# Patient Record
Sex: FEMALE | Race: WHITE | NOT HISPANIC OR LATINO | Employment: OTHER | URBAN - METROPOLITAN AREA
[De-identification: names, ages, dates, MRNs, and addresses within clinical notes are randomized per-mention and may not be internally consistent; named-entity substitution may affect disease eponyms.]

---

## 2024-04-12 ENCOUNTER — OFFICE VISIT (OUTPATIENT)
Dept: ORTHOPEDIC SURGERY | Facility: HOSPITAL | Age: 69
End: 2024-04-12
Payer: MEDICARE

## 2024-04-12 ENCOUNTER — HOSPITAL ENCOUNTER (OUTPATIENT)
Dept: RADIOLOGY | Facility: HOSPITAL | Age: 69
Discharge: HOME | End: 2024-04-12
Payer: MEDICARE

## 2024-04-12 VITALS — HEIGHT: 61 IN | BODY MASS INDEX: 31.53 KG/M2 | WEIGHT: 167 LBS

## 2024-04-12 DIAGNOSIS — M25.521 BILATERAL ELBOW JOINT PAIN: ICD-10-CM

## 2024-04-12 DIAGNOSIS — M25.522 BILATERAL ELBOW JOINT PAIN: ICD-10-CM

## 2024-04-12 DIAGNOSIS — M19.022 ARTHRITIS OF BOTH ELBOWS: Primary | ICD-10-CM

## 2024-04-12 DIAGNOSIS — M19.021 ARTHRITIS OF BOTH ELBOWS: Primary | ICD-10-CM

## 2024-04-12 PROCEDURE — 73080 X-RAY EXAM OF ELBOW: CPT | Mod: 50

## 2024-04-12 PROCEDURE — 1159F MED LIST DOCD IN RCRD: CPT | Performed by: NURSE PRACTITIONER

## 2024-04-12 PROCEDURE — 1036F TOBACCO NON-USER: CPT | Performed by: NURSE PRACTITIONER

## 2024-04-12 PROCEDURE — 99203 OFFICE O/P NEW LOW 30 MIN: CPT | Performed by: NURSE PRACTITIONER

## 2024-04-12 PROCEDURE — 73080 X-RAY EXAM OF ELBOW: CPT | Mod: BILATERAL PROCEDURE | Performed by: RADIOLOGY

## 2024-04-12 PROCEDURE — 1160F RVW MEDS BY RX/DR IN RCRD: CPT | Performed by: NURSE PRACTITIONER

## 2024-04-12 PROCEDURE — 1125F AMNT PAIN NOTED PAIN PRSNT: CPT | Performed by: NURSE PRACTITIONER

## 2024-04-12 PROCEDURE — 99213 OFFICE O/P EST LOW 20 MIN: CPT | Performed by: NURSE PRACTITIONER

## 2024-04-12 RX ORDER — CALCIUM CARBONATE/VITAMIN D3 600MG-5MCG
2 TABLET ORAL DAILY
COMMUNITY

## 2024-04-12 RX ORDER — LEVOTHYROXINE SODIUM 112 UG/1
112 TABLET ORAL
COMMUNITY
Start: 2022-07-05

## 2024-04-12 RX ORDER — GUAIFENESIN 1200 MG
1000 TABLET, EXTENDED RELEASE 12 HR ORAL
COMMUNITY

## 2024-04-12 RX ORDER — CETIRIZINE HYDROCHLORIDE 10 MG/1
10 TABLET ORAL
COMMUNITY

## 2024-04-12 RX ORDER — ROPINIROLE 0.5 MG/1
0.5 TABLET, FILM COATED ORAL NIGHTLY
COMMUNITY

## 2024-04-12 RX ORDER — AMLODIPINE BESYLATE 5 MG/1
5 TABLET ORAL
COMMUNITY
Start: 2019-06-10

## 2024-04-12 RX ORDER — ASCORBIC ACID 500 MG
500 TABLET ORAL
COMMUNITY
Start: 2023-12-12

## 2024-04-12 ASSESSMENT — PAIN DESCRIPTION - DESCRIPTORS: DESCRIPTORS: ACHING

## 2024-04-12 ASSESSMENT — PAIN SCALES - GENERAL: PAINLEVEL_OUTOF10: 4

## 2024-04-12 ASSESSMENT — PAIN - FUNCTIONAL ASSESSMENT: PAIN_FUNCTIONAL_ASSESSMENT: 0-10

## 2024-04-12 NOTE — PROGRESS NOTES
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      Patient Discussion/Plan:  We had a discussion about the various options for the elbow arthritis. They can do nothing and continue with activity modifications. Consider cortisone injections into the joint. This would give pain relief that is temporary and would not stop the progression of arthritis. This injection can last not at all, for 2 weeks, 4 weeks, 3 months or longer. Each patient has a different outcome. The risk of injection is fairly minimal, but does included a very small chance of infection. Thirdly, they can consider a total elbow replacement. This will give the patient a more permanent solution to pain relief. This is an elective procedure and there is no rush.    The goal is to return to regular activities at 3-4 months, but can expect 6 months to a year for a full recovery. Limited to no lifting over 10 pounds for life after replacement.     Dionne would like to proceed with OT for her bilateral elbows at this time.  A therapy prescription was given to the patient today. She will plan to do this with her provider near her home, with whom she is working with for other body system.    All the patient's questions were answered today to their satisfaction and they are in agreement with the above plan.     Should you have any questions or concerns after your visit today, please do not hesitate to call the office at [phone]. We strive to give you high-quality, patient-centered, collaborative care and I am honored to be a part of your health care team.     -------------------------------------------------------------------------------------------------  CHIEF COMPLAINT:  NPV BILATERAL ELBOW PAIN    History of Present Illness:  Florina Doll is a pleasant 69 y.o. 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 She is accompanied by her therapist, Ze who has  referred her to our team. She is presenting to clinic with bilateral elbow pain, right  worse than left today. She has had elbow pain for years. Pain worsened after falling onto her left elbow when she slipped on ice 'years ago'. Denies recent trauma. Bilateral elbow XR today.       Florina has had this elbow pain for several years. They dislocated their left elbow in 1978 and had a stabilization surgery. Florina recently returned to the gym and attempted upper body strengthening but had to discontinue upper body lifting due to elbow pain. Their elbow pain wakes them up at night. Florina denies any paresthesia bilaterally. No recent falls reported. She has been worked up in the past for her elbows at [facility] and was told to 'live with it as she is too young for elbow replacement'.    She has not tried any therapy. She has had previous injections without much relief. The elbow has not really gotten any better. 50% of a normal elbow. Does not wake her from sleep, but both of her elbows are stiff and 'locked' upon waking. Does not feel unstable. No numbness or tingling. Taking over-the counter medications for pain relief. She uses Oil and tumeric to help with the painful symptoms. She has tried the Band-It brace without much improvement. She has tried ice, which also does not help. Denies fevers or chills. She is able to continue to play her musical instrument.     Smoking Status: Non-smoker  Diabetic: Denies  BMI: 32    Past medical history, surgical history, social history and family history were all reviewed and are per the patient's health history. Family history is not part of patient's issue today.      Allergies:  Allergies   Allergen Reactions    Clarithromycin Swelling    Sulfa (Sulfonamide Antibiotics) Itching and Rash    Topiramate Rash, Other and Swelling     Other reaction(s): Other: See Comments   Hands and feet go numb    Hands and feet go numb    Cefazolin Rash    Iodinated Contrast Media Itching and Rash      ITCHED ALL OVER    Morphine Hives, Itching and Rash    Sulfamethoxazole-Trimethoprim Rash       History reviewed. No pertinent past medical history.    History reviewed. No pertinent surgical history.    Social History     Socioeconomic History    Marital status:      Spouse name: Not on file    Number of children: Not on file    Years of education: Not on file    Highest education level: Not on file   Occupational History    Not on file   Tobacco Use    Smoking status: Never    Smokeless tobacco: Never   Vaping Use    Vaping status: Never Used   Substance and Sexual Activity    Alcohol use: Never    Drug use: Never    Sexual activity: Not on file   Other Topics Concern    Not on file   Social History Narrative    Not on file     Social Determinants of Health     Financial Resource Strain: Low Risk  (10/11/2022)    Received from [facility]    Overall Financial Resource Strain (CARDIA)     Difficulty of Paying Living Expenses: Not hard at all   Food Insecurity: No Food Insecurity (10/11/2022)    Received from [facility]    Hunger Vital Sign     Worried About Running Out of Food in the Last Year: Never true     Ran Out of Food in the Last Year: Never true   Transportation Needs: No Transportation Needs (3/19/2019)    Received from [facility]    PRAPARE - Transportation     Lack of Transportation (Medical): No     Lack of Transportation (Non-Medical): No   Physical Activity: Insufficiently Active (9/24/2019)    Received from [facility]    Exercise Vital Sign     Days of Exercise per Week: 2 days     Minutes of Exercise per Session: 30 min   Stress: Not on file   Social Connections: Not on file   Intimate Partner Violence: Not on file   Housing Stability: Not on file       Medications:    Current Outpatient Medications:     amLODIPine (Norvasc) 5 mg tablet, Take 1 tablet (5 mg) by mouth once daily., Disp: , Rfl:      ascorbic acid (Vitamin C) 500 mg tablet, Take 1 tablet (500 mg) by mouth once daily., Disp: , Rfl:     levothyroxine (Synthroid, Levoxyl) 112 mcg tablet, Take 1 tablet (112 mcg) by mouth., Disp: , Rfl:     acetaminophen (Tylenol) 325 mg capsule, Take 1,000 mg by mouth., Disp: , Rfl:     calcium carbonate-vitamin D3 600 mg-5 mcg (200 unit) tablet, Take 2 tablets by mouth once daily., Disp: , Rfl:     cetirizine (ZyrTEC) 10 mg tablet, Take 1 tablet (10 mg) by mouth once daily in the morning. Take before meals., Disp: , Rfl:     rOPINIRole (Requip) 0.5 mg tablet, Take 1 tablet (0.5 mg) by mouth once daily at bedtime., Disp: , Rfl:     Family History:  No family history on file.    Review of Systems:   Review of systems for all 14 systems is negative for complaint except for the above noted findings in the history of present illness.    Diagnoses/Problems:  BILATERAL ELBOW ARTHRITIS    Physical Examination:  Patient is a well-developed, well nourished female in no acute distress. Awake, alert and oriented x3, with appropriate mood. Breathes with normal chest rises. Eye exam reveals round pupils with clear sclera. Gait is steady.     Examination of bilateral elbows today reveals the skin to be intact, no evidence of ecchymosis, bruising, lesions. 5 out of 5 wrist flexion and extension. Stable to varus and valgus stress. Range of motion of the bilateral elbows revealed 20/ degree of extension and flexion. 85/85 degrees of supination and pronation. Active elbow flexion and extension. Active thumb extension. Sensation intact to light touch to median, ulnar, radial nerves. Patient had no tenderness to palpation at their medial or lateral epicondyle.    Results/Imaging:  Independent review of the imaging today of BILATERAL elbows shows no signs of any fracture or dislocation. Severe arthritis with loose bodies. L>R. I personally spent time viewing digital images of the radiology testing with the patient. I explained the  results to the patient, along with suggested explanation for follow up recommendations based on testing and clinical results.       *While intending to generate a timely document that accurately reflects the content of the visit, no guarantee can be provided that every grammatical or spelling mistake has been or will be identified or corrected. Thank you for your understanding.

## 2024-07-19 ENCOUNTER — APPOINTMENT (OUTPATIENT)
Dept: ORTHOPEDIC SURGERY | Facility: CLINIC | Age: 69
End: 2024-07-19
Payer: MEDICARE

## 2024-07-19 DIAGNOSIS — M19.022 ARTHRITIS OF BOTH ELBOWS: Primary | ICD-10-CM

## 2024-07-19 DIAGNOSIS — M19.021 ARTHRITIS OF BOTH ELBOWS: Primary | ICD-10-CM

## 2024-07-19 PROCEDURE — 99214 OFFICE O/P EST MOD 30 MIN: CPT | Performed by: ORTHOPAEDIC SURGERY

## 2024-07-19 NOTE — PROGRESS NOTES
Subjective   Patient ID: Florina Doll is a 69 y.o. female    Chief Complaint: No chief complaint on file.       Last Surgery: No surgery found  Date of Last Surgery: No surgery found    HPI  Florina Doll is a 69 y.o. right hand dominant female presenting for bilateral elbow arthritis right > left.     She was last seen by Irma at the beginning of the month. She was provided a prescription for occupational therapy. She does have numbness and tingling. Injections have somewhat helped.     She does have inflammatory arthritis in other joints. She has had bilateral knee replacements.     Objective   Patient is a well-developed, well nourished female in no acute distress. Awake, alert and oriented x3, with appropriate mood. Breathes with normal chest rises. Eye exam reveals round pupils with clear sclera. Gait is steady.      Examination of bilateral elbows today reveals the skin to be intact, no evidence of ecchymosis, bruising, lesions. 5 out of 5 wrist flexion and extension. Stable to varus and valgus stress. Range of motion of the bilateral elbows revealed 20/ degree of extension and flexion. 75/75 degrees of supination and pronation. Active elbow flexion and extension. Active thumb extension. Sensation intact to light touch to median, ulnar, radial nerves. Patient had no tenderness to palpation at their medial or lateral epicondyle.        Imaging:  X-rays of the bilateral elbows show inflammatory and destructive arthritis    Assessment/Plan   No diagnosis found.  Patient with end stage arthritis of the bilateral elbows; right worse than left    We had a discussion about the various options for the elbow arthritis. They can do nothing and continue with activity modifications. Consider cortisone injections into the joint. This would give pain relief that is temporary and would not stop the progression of arthritis. This injection can last not at all, for 2 weeks, 4 weeks, 3 months or longer. Each patient  has a different outcome. The risk of injection is fairly minimal, but does included a very small chance of infection. Thirdly, they can consider a total elbow replacement. This will give the patient a more permanent solution to pain relief. This is an elective procedure and there is no rush.    The goal is to return to regular activities at 3-4 months, but can expect 6 months to a year for a full recovery. Limited to no lifting over 10 pounds for life after replacement.     We had a discussion regarding her diagnosis today. We talked about her treatment options. She has been to some physical therapy appointments thus far and is still having pain. She is going to continue her physical therapy. She will follow up as needed.   No orders of the defined types were placed in this encounter.    Follow up as needed.     Scribe Attestation  By signing my name below, Catherine FELDER Scribe   attest that this documentation has been prepared under the direction and in the presence of Michael Flores MD.

## 2024-07-23 ENCOUNTER — APPOINTMENT (OUTPATIENT)
Dept: ORTHOPEDIC SURGERY | Facility: HOSPITAL | Age: 69
End: 2024-07-23
Payer: MEDICARE

## 2024-09-24 ENCOUNTER — OFFICE VISIT (OUTPATIENT)
Dept: ORTHOPEDIC SURGERY | Facility: HOSPITAL | Age: 69
End: 2024-09-24
Payer: MEDICARE

## 2024-09-24 VITALS — WEIGHT: 175 LBS | BODY MASS INDEX: 33.04 KG/M2 | HEIGHT: 61 IN

## 2024-09-24 DIAGNOSIS — M19.022 ARTHRITIS OF ELBOW, LEFT: Primary | ICD-10-CM

## 2024-09-24 PROCEDURE — 99214 OFFICE O/P EST MOD 30 MIN: CPT | Performed by: NURSE PRACTITIONER

## 2024-09-24 PROCEDURE — 1036F TOBACCO NON-USER: CPT | Performed by: NURSE PRACTITIONER

## 2024-09-24 PROCEDURE — 1125F AMNT PAIN NOTED PAIN PRSNT: CPT | Performed by: NURSE PRACTITIONER

## 2024-09-24 PROCEDURE — 1160F RVW MEDS BY RX/DR IN RCRD: CPT | Performed by: NURSE PRACTITIONER

## 2024-09-24 PROCEDURE — 1159F MED LIST DOCD IN RCRD: CPT | Performed by: NURSE PRACTITIONER

## 2024-09-24 PROCEDURE — 3008F BODY MASS INDEX DOCD: CPT | Performed by: NURSE PRACTITIONER

## 2024-09-24 RX ORDER — IRBESARTAN 300 MG/1
300 TABLET ORAL
COMMUNITY
Start: 2024-08-28 | End: 2025-08-28

## 2024-09-24 RX ORDER — ACYCLOVIR 200 MG/1
200 CAPSULE ORAL AS NEEDED
COMMUNITY

## 2024-09-24 ASSESSMENT — PAIN SCALES - GENERAL: PAINLEVEL_OUTOF10: 4

## 2024-09-24 ASSESSMENT — PAIN - FUNCTIONAL ASSESSMENT: PAIN_FUNCTIONAL_ASSESSMENT: 0-10

## 2024-09-24 NOTE — PROGRESS NOTES
Provider Impression/Assessment:  Florina Doll has bilateral elbow end-arthritis, left symptomatically worse than right today.       Patient Discussion/Plan:  We had a discussion about the various options for the elbow arthritis. They can do nothing and continue with activity modifications. Consider cortisone injections into the joint. This would give pain relief that is temporary and would not stop the progression of arthritis. This injection can last not at all, for 2 weeks, 4 weeks, 3 months or longer. Each patient has a different outcome. The risk of injection is fairly minimal, but does included a very small chance of infection. Thirdly, they can consider a total elbow replacement. This will give the patient a more permanent solution to pain relief. This is an elective procedure and there is no rush.    The goal is to return to regular activities at 3-4 months, but can expect 6 months to a year for a full recovery. Limited to no lifting over 10 pounds for life after replacement. She has now failed conservative management of physical therapy, injections and OTC medications for her elbow arthritis.      Dionne would like to proceed with LEFT total elbow arthroplasty at this time.     The rehabilitation after surgery was discussed at length. It would be approximately 7 days in a splint following surgery. We will then transition her into a flex-hinged brace to start gentle range of motion of her elbow. She was given a detailed description of post operative care following elbow replacement surgery today. We anticipate they will make a near full recovery around 3-4 months, but with continued improvement up to a year after surgery. We also discussed the hospital course. Usually patients stay one night but sometimes they are able to leave same day. We will plan on patient staying one full night, as she will have a drain post operatively that will be removed prior to her leaving the hospital. We will plan on her being  released to home after that as she has good family support at home.      We have scheduled Florina for total elbow arthroplasty on 10/30/24 to take place at [facility]. Preoperative CT scan will need to be completed for pre-operative planning with [facility] protocol prior to surgery date to be sure that we did not miss anything from a structural standpoint. She is aware that she has  to be medically cleared by the surgical anesthesia team prior to surgery.    All the patient's questions were answered today to their satisfaction and they are in agreement with the above plan. Patient was discussed with Dr Flores and he is in full agreement with the above surgical plan.     Should you have any questions or concerns after your visit today, please do not hesitate to call the office at [phone]. We strive to give you high-quality, patient-centered, collaborative care and I am honored to be a part of your health care team.     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 She is accompanied by her therapist, Ze who has referred her to our team. She is presenting to clinic with bilateral elbow pain, right  worse than left today. She has had elbow pain for years. Pain worsened after falling onto her left elbow when she slipped on ice 'years ago'. Denies recent trauma. Bilateral elbow XR today.       Florina has had this elbow pain for several years. They dislocated their left elbow in 1978 and had a stabilization surgery. Florina recently returned to the gym and attempted upper body strengthening but had to discontinue upper body lifting due to elbow pain. Their elbow pain wakes them up at night. Florina denies any paresthesia bilaterally. No recent falls  reported. She has been worked up in the past for her elbows at [facility] and was told to 'live with it as she is too young for elbow replacement'. She has not tried any therapy. She has had previous injections without much relief. The elbow has not really gotten any better. 50% of a normal elbow. Does not wake her from sleep, but both of her elbows are stiff and 'locked' upon waking. Does not feel unstable. No numbness or tingling. Taking over-the counter medications for pain relief. She uses Oil and tumeric to help with the painful symptoms. She has tried the Band-It brace without much improvement. She has tried ice, which also does not help. Denies fevers or chills. She is able to continue to play her musical instrument.     7/19/24  Florina Doll is a 69 y.o. right hand dominant female presenting for bilateral elbow arthritis right > left. She was last seen by Irma at the beginning of the month. She was provided a prescription for occupational therapy. She does have numbness and tingling. Injections have somewhat helped. She does have inflammatory arthritis in other joints. She has had bilateral knee replacements.     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 She is here today for a preoperative planning visit. She is scheduled for a left total elbow arthroplasty on 10/30/24 at [facility]. No new concerns. She would like to have surgery on her left elbow first as she is right handed and they both are hurting her on a daily basis. She has completed physical therapy at this point and has undergone injections for her elbows without any relief of her daily pain.     Smoking Status: Non-smoker  Diabetic: Denies  BMI: 32    Past medical history, surgical history, social history and family history were all reviewed and are per the patient's health history. Family history is not part of patient's issue today.         Allergies:  Allergies   Allergen Reactions    Clarithromycin Swelling    Sulfa (Sulfonamide Antibiotics) Itching and Rash    Topiramate Rash, Other and Swelling     Other reaction(s): Other: See Comments   Hands and feet go numb    Hands and feet go numb    Cefazolin Rash    Iodinated Contrast Media Itching and Rash     ITCHED ALL OVER    Morphine Hives, Itching and Rash    Sulfamethoxazole-Trimethoprim Rash       No past medical history on file.    No past surgical history on file.    Social History     Socioeconomic History    Marital status:      Spouse name: Not on file    Number of children: Not on file    Years of education: Not on file    Highest education level: Not on file   Occupational History    Not on file   Tobacco Use    Smoking status: Never    Smokeless tobacco: Never   Vaping Use    Vaping status: Never Used   Substance and Sexual Activity    Alcohol use: Never    Drug use: Never    Sexual activity: Not on file   Other Topics Concern    Not on file   Social History Narrative    Not on file     Social Determinants of Health     Financial Resource Strain: Low Risk  (10/11/2022)    Received from [facility]    Overall Financial Resource Strain (CARDIA)     Difficulty of Paying Living Expenses: Not hard at all   Food Insecurity: No Food Insecurity (10/11/2022)    Received from [facility]    Hunger Vital Sign     Worried About Running Out of Food in the Last Year: Never true     Ran Out of Food in the Last Year: Never true   Transportation Needs: No Transportation Needs (3/19/2019)    Received from [facility]    PRAPARE - Transportation     Lack of Transportation (Medical): No     Lack of Transportation (Non-Medical): No   Physical Activity: Insufficiently Active (9/24/2019)    [facility]    Exercise Vital Sign     Days of Exercise per Week: 2 days     Minutes of Exercise per Session: 30 min   Stress: Not on file   Social Connections: Not on file   Intimate Partner Violence: Not on file   Housing Stability: Not on file       Medications:    Current Outpatient Medications:     acetaminophen (Tylenol) 325 mg capsule, Take 1,000 mg by mouth., Disp: , Rfl:     amLODIPine (Norvasc) 5 mg tablet, Take 1 tablet (5 mg) by mouth once daily., Disp: , Rfl:     ascorbic acid (Vitamin C) 500 mg tablet, Take 1 tablet (500 mg) by mouth once daily., Disp: , Rfl:     calcium carbonate-vitamin D3 600 mg-5 mcg (200 unit) tablet, Take 2 tablets by mouth once daily., Disp: , Rfl:     cetirizine (ZyrTEC) 10 mg tablet, Take 1 tablet (10 mg) by mouth once daily in the morning. Take before meals., Disp: , Rfl:     levothyroxine (Synthroid, Levoxyl) 112 mcg tablet, Take 1 tablet (112 mcg) by mouth., Disp: , Rfl:     rOPINIRole (Requip) 0.5 mg tablet, Take 1 tablet (0.5 mg) by mouth once daily at bedtime., Disp: , Rfl:     Family History:  No family history on file.    Review of Systems:   Review of systems for all 14 systems is negative for complaint except for the above noted findings in the history of present illness.    Diagnoses/Problems:  BILATERAL ELBOW ARTHRITIS    Physical Examination:  Patient is a well-developed, well nourished female in no acute distress. Awake, alert and oriented x3, with appropriate mood. Breathes with normal chest rises. Eye exam reveals round pupils with clear sclera. Gait is steady.     Examination of bilateral elbows today reveals the skin to be intact, no evidence of ecchymosis, bruising, lesions. 5 out of 5 wrist flexion and extension. Stable to varus and valgus stress. Range of motion of the bilateral elbows revealed 20/ degree of extension and flexion. 85/85 degrees of supination and pronation. Active elbow flexion and extension. Active  thumb extension. Sensation intact to light touch to median, ulnar, radial nerves. Patient had no tenderness to palpation at their medial or lateral epicondyle.    Results/Imaging:  Independent review of the imaging of BILATERAL elbows shows no signs of any fracture or dislocation. Severe arthritis with loose bodies. L>R. I personally spent time viewing digital images of the radiology testing with the patient. I explained the results to the patient, along with suggested explanation for follow up recommendations based on testing and clinical results.       *While intending to generate a timely document that accurately reflects the content of the visit, no guarantee can be provided that every grammatical or spelling mistake has been or will be identified or corrected. Thank you for your understanding.

## 2024-10-02 ENCOUNTER — HOSPITAL ENCOUNTER (OUTPATIENT)
Dept: RADIOLOGY | Facility: CLINIC | Age: 69
Discharge: HOME | End: 2024-10-02
Payer: MEDICARE

## 2024-10-02 DIAGNOSIS — M19.022 ARTHRITIS OF ELBOW, LEFT: ICD-10-CM

## 2024-10-02 PROCEDURE — 73200 CT UPPER EXTREMITY W/O DYE: CPT | Mod: LT

## 2024-10-15 ENCOUNTER — CLINICAL SUPPORT (OUTPATIENT)
Dept: PREADMISSION TESTING | Facility: HOSPITAL | Age: 69
End: 2024-10-15
Payer: MEDICARE

## 2024-10-15 ENCOUNTER — APPOINTMENT (OUTPATIENT)
Dept: ORTHOPEDIC SURGERY | Facility: HOSPITAL | Age: 69
End: 2024-10-15
Payer: MEDICARE

## 2024-10-15 DIAGNOSIS — M19.022 ARTHRITIS OF ELBOW, LEFT: ICD-10-CM

## 2024-10-15 RX ORDER — CALCIUM CARBONATE 200(500)MG
1 TABLET,CHEWABLE ORAL 4 TIMES DAILY PRN
COMMUNITY

## 2024-10-15 RX ORDER — VIT C/E/ZN/COPPR/LUTEIN/ZEAXAN 250MG-90MG
2 CAPSULE ORAL DAILY
COMMUNITY

## 2024-10-15 RX ORDER — MV/FA/DHA/EPA/FISH OIL/SAW/GNK 400MCG-200
1 COMBINATION PACKAGE (EA) ORAL DAILY
COMMUNITY

## 2024-10-15 RX ORDER — VITAMIN B COMPLEX
1 CAPSULE ORAL DAILY
COMMUNITY

## 2024-10-15 RX ORDER — LEVOTHYROXINE SODIUM 125 UG/1
1 TABLET ORAL
COMMUNITY
Start: 2024-08-02

## 2024-10-15 RX ORDER — IBUPROFEN 800 MG/1
800 TABLET ORAL 3 TIMES DAILY
COMMUNITY

## 2024-10-15 RX ORDER — AMLODIPINE BESYLATE 5 MG/1
5 TABLET ORAL DAILY
COMMUNITY
Start: 2024-10-04

## 2024-10-15 RX ORDER — DICLOFENAC SODIUM 10 MG/G
GEL TOPICAL 2 TIMES DAILY PRN
COMMUNITY

## 2024-10-15 RX ORDER — BISMUTH SUBSALICYLATE 262 MG
1 TABLET,CHEWABLE ORAL DAILY
COMMUNITY

## 2024-10-15 RX ORDER — IRBESARTAN AND HYDROCHLOROTHIAZIDE 300; 12.5 MG/1; MG/1
1 TABLET, FILM COATED ORAL
COMMUNITY
Start: 2024-10-04 | End: 2025-10-04

## 2024-10-15 RX ORDER — GARLIC 1000 MG
1 CAPSULE ORAL DAILY
COMMUNITY

## 2024-10-25 ENCOUNTER — PRE-ADMISSION TESTING (OUTPATIENT)
Dept: PREADMISSION TESTING | Facility: HOSPITAL | Age: 69
End: 2024-10-25
Payer: MEDICARE

## 2024-10-25 ENCOUNTER — LAB (OUTPATIENT)
Dept: LAB | Facility: LAB | Age: 69
End: 2024-10-25
Payer: MEDICARE

## 2024-10-25 VITALS
TEMPERATURE: 98.8 F | HEIGHT: 61 IN | DIASTOLIC BLOOD PRESSURE: 77 MMHG | SYSTOLIC BLOOD PRESSURE: 137 MMHG | WEIGHT: 176.37 LBS | RESPIRATION RATE: 18 BRPM | HEART RATE: 96 BPM | BODY MASS INDEX: 33.3 KG/M2 | OXYGEN SATURATION: 96 %

## 2024-10-25 DIAGNOSIS — Z13.1 SCREENING FOR DIABETES MELLITUS (DM): ICD-10-CM

## 2024-10-25 DIAGNOSIS — Z01.818 PREOP TESTING: Primary | ICD-10-CM

## 2024-10-25 DIAGNOSIS — R52 PAIN: ICD-10-CM

## 2024-10-25 DIAGNOSIS — Z01.818 PREOP TESTING: ICD-10-CM

## 2024-10-25 DIAGNOSIS — R73.9 ELEVATED BLOOD SUGAR: ICD-10-CM

## 2024-10-25 LAB
ATRIAL RATE: 88 BPM
EST. AVERAGE GLUCOSE BLD GHB EST-MCNC: 126 MG/DL
HBA1C MFR BLD: 6 %
P AXIS: 57 DEGREES
P OFFSET: 208 MS
P ONSET: 159 MS
PR INTERVAL: 130 MS
Q ONSET: 224 MS
QRS COUNT: 15 BEATS
QRS DURATION: 116 MS
QT INTERVAL: 382 MS
QTC CALCULATION(BAZETT): 462 MS
QTC FREDERICIA: 434 MS
R AXIS: 41 DEGREES
T AXIS: 13 DEGREES
T OFFSET: 415 MS
VENTRICULAR RATE: 88 BPM

## 2024-10-25 PROCEDURE — 87081 CULTURE SCREEN ONLY: CPT | Mod: AHULAB | Performed by: PHYSICIAN ASSISTANT

## 2024-10-25 PROCEDURE — 83036 HEMOGLOBIN GLYCOSYLATED A1C: CPT

## 2024-10-25 PROCEDURE — 36415 COLL VENOUS BLD VENIPUNCTURE: CPT

## 2024-10-25 PROCEDURE — 93010 ELECTROCARDIOGRAM REPORT: CPT | Performed by: INTERNAL MEDICINE

## 2024-10-25 PROCEDURE — 93005 ELECTROCARDIOGRAM TRACING: CPT | Performed by: PHYSICIAN ASSISTANT

## 2024-10-25 PROCEDURE — 99204 OFFICE O/P NEW MOD 45 MIN: CPT | Performed by: PHYSICIAN ASSISTANT

## 2024-10-25 RX ORDER — ACETAMINOPHEN 500 MG
1000 TABLET ORAL EVERY 8 HOURS PRN
COMMUNITY

## 2024-10-25 RX ORDER — CHLORHEXIDINE GLUCONATE ORAL RINSE 1.2 MG/ML
SOLUTION DENTAL
Qty: 473 ML | Refills: 0 | Status: SHIPPED | OUTPATIENT
Start: 2024-10-25

## 2024-10-25 ASSESSMENT — ENCOUNTER SYMPTOMS
CONSTITUTIONAL NEGATIVE: 1
ENDOCRINE NEGATIVE: 1
CARDIOVASCULAR NEGATIVE: 1
GASTROINTESTINAL NEGATIVE: 1
ALLERGIC/IMMUNOLOGIC NEGATIVE: 1
MUSCULOSKELETAL NEGATIVE: 1
HEMATOLOGIC/LYMPHATIC NEGATIVE: 1
EYES NEGATIVE: 1
NERVOUS/ANXIOUS: 1
NEUROLOGICAL NEGATIVE: 1
RESPIRATORY NEGATIVE: 1

## 2024-10-25 NOTE — CPM/PAT H&P
"[facility]/PAT Evaluation       Name: Florina Doll (Florina Doll \"Dionne\")  /Age: 1955/69 y.o.     In-Person       Chief Complaint: Left Elbow Total Arthroplasty     HPI      Date of Consult: 10/25/24    Referring Provider: Dr. Flores    Surgery, Date, and Length: Left Elbow Total Arthroplasty; 10/28/24; 150 minutes     Florina Doll  is a 69 year-old female who presents to the [facility] for perioperative risk assessment prior to surgery. Patient is right hand dominant. Presented with bilateral elbow end-arthritis, left symptomatically worse than right. She has had elbow pain x years. She dislocated her left elbow in  and had a stabilization surgery. Her elbow pain wakes her up at night. Previous injections did not provide relief. Taking over-the counter medications for pain relief. She recently returned to the gym and attempted upper body strengthening but had to discontinue upper body lifting due to elbow pain.     This note was created in part upon personal review of patient's medical records.      Patient is scheduled to have Left Elbow Total Arthroplasty     Medical History  Past Medical History:   Diagnosis Date    Arthritis     Cleft lip and cleft palate (HHS-HCC)     Diverticulosis     DVT (deep venous thrombosis) (Multi)     Right poipliteal, after TKA    Hearing aid worn     Right ear only    Hypertension     Hypothyroidism     PONV (postoperative nausea and vomiting)     RLS (restless legs syndrome)         STOP BANG =  2   (>53yo, htn)    Caprini =  8   (age,total joint, BMI.25)    Surgical History  Past Surgical History:   Procedure Laterality Date    APPENDECTOMY      CHOLECYSTECTOMY      COLON SURGERY      resection for diverticulitis    DILATION AND CURETTAGE OF UTERUS      ,  lysis of adhesions    ELBOW FUSION Left     FOOT FUSION Right 2023    navicular bone replacement    FOOT FUSION Right 2023    REVISION    FOOT SURGERY Bilateral     spherical implants "    HARDWARE REMOVAL FOOT / ANKLE Left 1995    LASIK Bilateral 1999    MOUTH SURGERY      multiple surgeries on cleft lip and palate    ORIF ANKLE FRACTURE Left 1994    TOTAL KNEE ARTHROPLASTY Right 2019    TOTAL KNEE ARTHROPLASTY Left     WRIST SURGERY Right 2005             Family history:  Family History   Problem Relation Name Age of Onset    No Known Problems Mother      No Known Problems Father      Accidental death Brother          MVA    Other (other) Brother          septic shock post op        Social history:  Social History     Socioeconomic History    Marital status:      Spouse name: Not on file    Number of children: Not on file    Years of education: Not on file    Highest education level: Not on file   Occupational History    Not on file   Tobacco Use    Smoking status: Never    Smokeless tobacco: Never   Vaping Use    Vaping status: Never Used   Substance and Sexual Activity    Alcohol use: Never    Drug use: Never    Sexual activity: Not on file   Other Topics Concern    Not on file   Social History Narrative    Not on file     Social Drivers of Health     Financial Resource Strain: Low Risk  (10/11/2022)    Received from [facility] [employer] [facility]    Overall Financial Resource Strain (CARDIA)     Difficulty of Paying Living Expenses: Not hard at all   Food Insecurity: No Food Insecurity (10/11/2022)    Received from [facility] [employer] [facility]    Hunger Vital Sign     Worried About Running Out of Food in the Last Year: Never true     Ran Out of Food in the Last Year: Never true   Transportation Needs: No Transportation Needs (3/19/2019)    Received from [facility] [employer] [facility]    PRAPARE - Transportation     Lack of Transportation (Medical): No     Lack of Transportation (Non-Medical): No   Physical Activity: Insufficiently Active (9/24/2019)    Received from [facility]  [facility] [facility]    Exercise Vital Sign     Days of Exercise per Week: 2 days     Minutes of Exercise per Session: 30 min   Stress: Not on file   Social Connections: Not on file   Intimate Partner Violence: Not on file   Housing Stability: Not on file          Current Outpatient Medications:     acetaminophen (Tylenol) 500 mg tablet, Take 2 tablets (1,000 mg) by mouth every 8 hours if needed for mild pain (1 - 3)., Disp: , Rfl:     amLODIPine (Norvasc) 5 mg tablet, Take 1 tablet (5 mg) by mouth once daily., Disp: , Rfl:     b complex vitamins capsule, Take 1 capsule by mouth once daily., Disp: , Rfl:     BERBERINE CHLORIDE ORAL, Take 1,500 mg by mouth once daily., Disp: , Rfl:     calcium carbonate (Tums) 200 mg calcium chewable tablet, Chew 1 tablet (500 mg) 4 times a day as needed for indigestion or heartburn., Disp: , Rfl:     calcium carbonate-vitamin D3 600 mg-5 mcg (200 unit) tablet, Take 2 tablets by mouth once daily., Disp: , Rfl:     cetirizine (ZyrTEC) 10 mg tablet, Take 1 tablet (10 mg) by mouth once daily in the morning. Take before meals., Disp: , Rfl:     garlic 1,000 mg capsule, Take 1 capsule by mouth once daily., Disp: , Rfl:     ibuprofen 800 mg tablet, Take 1 tablet (800 mg) by mouth 3 times a day., Disp: , Rfl:     irbesartan-hydrochlorothiazide (Avalide) 300-12.5 mg tablet, Take 1 tablet by mouth once daily., Disp: , Rfl:     krill oil 500 mg capsule, Take 1 capsule (500 mg) by mouth once daily., Disp: , Rfl:     levothyroxine (Synthroid, Levoxyl) 125 mcg tablet, Take 1 tablet (125 mcg) by mouth early in the morning.., Disp: , Rfl:     multivitamin tablet, Take 1 tablet by mouth once daily., Disp: , Rfl:     NON FORMULARY, Take 2 each by mouth once daily. MORINGA, Disp: , Rfl:     red yeast rice 600 mg capsule, Take 2 capsules by mouth once daily., Disp: , Rfl:     rOPINIRole (Requip) 0.5 mg tablet, Take 1 tablet (0.5 mg) by mouth once daily at  "bedtime., Disp: , Rfl:     chlorhexidine (Peridex) 0.12 % solution, 15 ml swish and spit for 30 seconds night prior to surgery and morning of surgery, Disp: 473 mL, Rfl: 0    diclofenac sodium (Voltaren) 1 % gel, Apply topically 2 times a day as needed., Disp: , Rfl:          Visit Vitals  /77   Pulse 96   Temp 37.1 °C (98.8 °F)   Resp 18   Ht 1.543 m (5' 0.75\")   Wt 80 kg (176 lb 5.9 oz)   SpO2 96%   BMI 33.60 kg/m²   Smoking Status Never   BSA 1.85 m²           Review of Systems   Constitutional: Negative.    HENT: Negative.     Eyes: Negative.    Respiratory: Negative.     Cardiovascular: Negative.         METS 4   hike / bike/ pool / gym Without chest pain / SOB    Gastrointestinal: Negative.    Endocrine: Negative.    Genitourinary: Negative.    Musculoskeletal: Negative.         Bilateral elbow pain   Right knee pain    Skin: Negative.    Allergic/Immunologic: Negative.    Neurological: Negative.    Hematological: Negative.    Psychiatric/Behavioral:  The patient is nervous/anxious.         Physical Exam  Vitals reviewed.   Constitutional:       Appearance: Normal appearance.   HENT:      Head: Normocephalic and atraumatic.      Right Ear: External ear normal.      Left Ear: External ear normal.      Nose: Nose normal.      Mouth/Throat:      Pharynx: Oropharynx is clear.      Comments: Residual cleft lip deformity  Eyes:      Extraocular Movements: Extraocular movements intact.      Conjunctiva/sclera: Conjunctivae normal.      Pupils: Pupils are equal, round, and reactive to light.   Cardiovascular:      Rate and Rhythm: Normal rate and regular rhythm.      Heart sounds: Normal heart sounds.   Pulmonary:      Effort: Pulmonary effort is normal.      Breath sounds: Normal breath sounds.   Abdominal:      Palpations: Abdomen is soft.   Musculoskeletal:         General: Normal range of motion.      Cervical back: Normal range of motion and neck supple.   Skin:     General: Skin is warm and dry. "   Neurological:      General: No focal deficit present.      Mental Status: She is alert and oriented to person, place, and time.   Psychiatric:         Mood and Affect: Mood normal.         Behavior: Behavior normal.          PAT AIRWAY:   Airway:     Mallampati::  III    Neck ROM::  Full   Permanent bridge upper front       CBC 10/4/24     White Blood Cell Count  3.8 - 10.8 Thousand/uL 4.5   RBC  3.80 - 5.10 Million/uL 5.06   HEMOGLOBIN  11.7 - 15.5 g/dL 15.4   HEMATOCRIT  35.0 - 45.0 % 46.0 High    MCV  80.0 - 100.0 fL 90.9   MCH  27.0 - 33.0 pg 30.4   MCHC  32.0 - 36.0 g/dL 33.5     RDW  11.0 - 15.0 % 13.6   Platelet Count  140 - 400 Thousand/uL 190   Mean Platelet Volume (MPV)  7.5 - 12.5 fL 11.6     CMP 10/4/24  GLUCOSE  65 - 139 mg/dL 112     Urea Nitrogen (BUN)  7 - 25 mg/dL 22   Creatinine  0.50 - 1.05 mg/dL 0.68   EGFR  > OR = 60 mL/min/1.73m2 95   BUN/CREATININE RATIO  6 - 22 (calc) SEE NOTE:   Comment:    Not Reported: BUN and Creatinine are within     reference range.         SODIUM  135 - 146 mmol/L 140   POTASSIUM  3.5 - 5.3 mmol/L 4.2   CHLORIDE  98 - 110 mmol/L 103   Carbon Dioxide (CO2)  20 - 32 mmol/L 26   CALCIUM  8.6 - 10.4 mg/dL 9.5   PROTEIN, TOTAL - QUEST  6.1 - 8.1 g/dL 7.0   ALBUMIN - QUEST  3.6 - 5.1 g/dL 4.6   GLOBULIN - QUEST  1.9 - 3.7 g/dL (calc) 2.4   ALBUMIN/GLOBULIN RATIO - QUEST  1.0 - 2.5 (calc) 1.9   BILIRUBIN, TOTAL - QUEST  0.2 - 1.2 mg/dL 0.5   ALKALINE PHOSPHATASE  37 - 153 U/L 78   AST - QUEST  10 - 35 U/L 22   ALT - QUEST  6 - 29 U/L 19     Lab Results   Component Value Date    HGBA1C 6.0 (H) 10/25/2024      EKG 10/24/24  NSR  Possible left atrial enlargement  RBBB  88 BPM     RCRI  0  , 3.9 % Risk of MACE    Cardiac  HTN -  irbesartan-hydrochlorothiazide HOLD 24 hours prior to surgery               amlodipine Continue DOS     Endocrinology  Hypothyroid -levothyroxine Continue DOS     Hematology       Patient instructed to ambulate as soon as possible postoperatively to  decrease thromboembolic risk.      Initiate mechanical DVT prophylaxis as soon as possible and initiate chemical prophylaxis when deemed safe from a bleeding standpoint post surgery.       VTE prophylaxis per surgical team       Tests ordered in PAT: cbc, cmp done 10/4, A1c,mrsa,EKG   LABS REVIEWED from 10/4: unremarkable     Risk assessment complete.  Patient is scheduled for a intermediate surgical risk procedure.        Preoperative medication instructions were provided and reviewed with the patient.  Any additional testing or evaluation was explained to the patient.  Nothing by mouth instructions were discussed and patient's questions were answered prior to conclusion to this encounter.  Patient verbalized understanding of preoperative instructions given in preadmission testing; discharge instructions available in EMR.    This note was dictated by a speech recognition.  Minor errors may have been detected in a speech recognition.

## 2024-10-25 NOTE — H&P (VIEW-ONLY)
"[facility]/PAT Evaluation       Name: Florina Doll (Florina Doll \"Dionne\")  /Age: 1955/69 y.o.     In-Person       Chief Complaint: Left Elbow Total Arthroplasty     HPI      Date of Consult: 10/25/24    Referring Provider: Dr. Flores    Surgery, Date, and Length: Left Elbow Total Arthroplasty; 10/28/24; 150 minutes     Florina Doll  is a 69 year-old female who presents to the [facility] for perioperative risk assessment prior to surgery. Patient is right hand dominant. Presented with bilateral elbow end-arthritis, left symptomatically worse than right. She has had elbow pain x years. She dislocated her left elbow in  and had a stabilization surgery. Her elbow pain wakes her up at night. Previous injections did not provide relief. Taking over-the counter medications for pain relief. She recently returned to the gym and attempted upper body strengthening but had to discontinue upper body lifting due to elbow pain.     This note was created in part upon personal review of patient's medical records.      Patient is scheduled to have Left Elbow Total Arthroplasty     Medical History  Past Medical History:   Diagnosis Date    Arthritis     Cleft lip and cleft palate (HHS-HCC)     Diverticulosis     DVT (deep venous thrombosis) (Multi)     Right poipliteal, after TKA    Hearing aid worn     Right ear only    Hypertension     Hypothyroidism     PONV (postoperative nausea and vomiting)     RLS (restless legs syndrome)         STOP BANG =  2   (>53yo, htn)    Caprini =  8   (age,total joint, BMI.25)    Surgical History  Past Surgical History:   Procedure Laterality Date    APPENDECTOMY      CHOLECYSTECTOMY      COLON SURGERY      resection for diverticulitis    DILATION AND CURETTAGE OF UTERUS      ,  lysis of adhesions    ELBOW FUSION Left     FOOT FUSION Right 2023    navicular bone replacement    FOOT FUSION Right 2023    REVISION    FOOT SURGERY Bilateral     spherical implants "    HARDWARE REMOVAL FOOT / ANKLE Left 1995    LASIK Bilateral 1999    MOUTH SURGERY      multiple surgeries on cleft lip and palate    ORIF ANKLE FRACTURE Left 1994    TOTAL KNEE ARTHROPLASTY Right 2019    TOTAL KNEE ARTHROPLASTY Left     WRIST SURGERY Right 2005             Family history:  Family History   Problem Relation Name Age of Onset    No Known Problems Mother      No Known Problems Father      Accidental death Brother          MVA    Other (other) Brother          septic shock post op        Social history:  Social History     Socioeconomic History    Marital status:      Spouse name: Not on file    Number of children: Not on file    Years of education: Not on file    Highest education level: Not on file   Occupational History    Not on file   Tobacco Use    Smoking status: Never    Smokeless tobacco: Never   Vaping Use    Vaping status: Never Used   Substance and Sexual Activity    Alcohol use: Never    Drug use: Never    Sexual activity: Not on file   Other Topics Concern    Not on file   Social History Narrative    Not on file     Social Drivers of Health     Financial Resource Strain: Low Risk  (10/11/2022)    Received from [facility] [employer] [facility]    Overall Financial Resource Strain (CARDIA)     Difficulty of Paying Living Expenses: Not hard at all   Food Insecurity: No Food Insecurity (10/11/2022)    Received from [facility] [employer] [facility]    Hunger Vital Sign     Worried About Running Out of Food in the Last Year: Never true     Ran Out of Food in the Last Year: Never true   Transportation Needs: No Transportation Needs (3/19/2019)    Received from [facility] [employer] [facility]    PRAPARE - Transportation     Lack of Transportation (Medical): No     Lack of Transportation (Non-Medical): No   Physical Activity: Insufficiently Active (9/24/2019)    Received from [facility]    Exercise Vital Sign     Days of Exercise per Week: 2 days     Minutes of Exercise per Session: 30 min   Stress: Not on file   Social Connections: Not on file   Intimate Partner Violence: Not on file   Housing Stability: Not on file          Current Outpatient Medications:     acetaminophen (Tylenol) 500 mg tablet, Take 2 tablets (1,000 mg) by mouth every 8 hours if needed for mild pain (1 - 3)., Disp: , Rfl:     amLODIPine (Norvasc) 5 mg tablet, Take 1 tablet (5 mg) by mouth once daily., Disp: , Rfl:     b complex vitamins capsule, Take 1 capsule by mouth once daily., Disp: , Rfl:     BERBERINE CHLORIDE ORAL, Take 1,500 mg by mouth once daily., Disp: , Rfl:     calcium carbonate (Tums) 200 mg calcium chewable tablet, Chew 1 tablet (500 mg) 4 times a day as needed for indigestion or heartburn., Disp: , Rfl:     calcium carbonate-vitamin D3 600 mg-5 mcg (200 unit) tablet, Take 2 tablets by mouth once daily., Disp: , Rfl:     cetirizine (ZyrTEC) 10 mg tablet, Take 1 tablet (10 mg) by mouth once daily in the morning. Take before meals., Disp: , Rfl:     garlic 1,000 mg capsule, Take 1 capsule by mouth once daily., Disp: , Rfl:     ibuprofen 800 mg tablet, Take 1 tablet (800 mg) by mouth 3 times a day., Disp: , Rfl:     irbesartan-hydrochlorothiazide (Avalide) 300-12.5 mg tablet, Take 1 tablet by mouth once daily., Disp: , Rfl:     krill oil 500 mg capsule, Take 1 capsule (500 mg) by mouth once daily., Disp: , Rfl:     levothyroxine (Synthroid, Levoxyl) 125 mcg tablet, Take 1 tablet (125 mcg) by mouth early in the morning.., Disp: , Rfl:     multivitamin tablet, Take 1 tablet by mouth once daily., Disp: , Rfl:     NON FORMULARY, Take 2 each by mouth once daily. MORINGA, Disp: , Rfl:     red yeast rice 600 mg capsule, Take 2 capsules by mouth once daily., Disp: , Rfl:     rOPINIRole (Requip) 0.5 mg tablet, Take 1 tablet (0.5 mg) by mouth once daily at  "bedtime., Disp: , Rfl:     chlorhexidine (Peridex) 0.12 % solution, 15 ml swish and spit for 30 seconds night prior to surgery and morning of surgery, Disp: 473 mL, Rfl: 0    diclofenac sodium (Voltaren) 1 % gel, Apply topically 2 times a day as needed., Disp: , Rfl:          Visit Vitals  /77   Pulse 96   Temp 37.1 °C (98.8 °F)   Resp 18   Ht 1.543 m (5' 0.75\")   Wt 80 kg (176 lb 5.9 oz)   SpO2 96%   BMI 33.60 kg/m²   Smoking Status Never   BSA 1.85 m²           Review of Systems   Constitutional: Negative.    HENT: Negative.     Eyes: Negative.    Respiratory: Negative.     Cardiovascular: Negative.         METS 4   hike / bike/ pool / gym Without chest pain / SOB    Gastrointestinal: Negative.    Endocrine: Negative.    Genitourinary: Negative.    Musculoskeletal: Negative.         Bilateral elbow pain   Right knee pain    Skin: Negative.    Allergic/Immunologic: Negative.    Neurological: Negative.    Hematological: Negative.    Psychiatric/Behavioral:  The patient is nervous/anxious.         Physical Exam  Vitals reviewed.   Constitutional:       Appearance: Normal appearance.   HENT:      Head: Normocephalic and atraumatic.      Right Ear: External ear normal.      Left Ear: External ear normal.      Nose: Nose normal.      Mouth/Throat:      Pharynx: Oropharynx is clear.      Comments: Residual cleft lip deformity  Eyes:      Extraocular Movements: Extraocular movements intact.      Conjunctiva/sclera: Conjunctivae normal.      Pupils: Pupils are equal, round, and reactive to light.   Cardiovascular:      Rate and Rhythm: Normal rate and regular rhythm.      Heart sounds: Normal heart sounds.   Pulmonary:      Effort: Pulmonary effort is normal.      Breath sounds: Normal breath sounds.   Abdominal:      Palpations: Abdomen is soft.   Musculoskeletal:         General: Normal range of motion.      Cervical back: Normal range of motion and neck supple.   Skin:     General: Skin is warm and dry. "   Neurological:      General: No focal deficit present.      Mental Status: She is alert and oriented to person, place, and time.   Psychiatric:         Mood and Affect: Mood normal.         Behavior: Behavior normal.          PAT AIRWAY:   Airway:     Mallampati::  III    Neck ROM::  Full   Permanent bridge upper front       CBC 10/4/24     White Blood Cell Count  3.8 - 10.8 Thousand/uL 4.5   RBC  3.80 - 5.10 Million/uL 5.06   HEMOGLOBIN  11.7 - 15.5 g/dL 15.4   HEMATOCRIT  35.0 - 45.0 % 46.0 High    MCV  80.0 - 100.0 fL 90.9   MCH  27.0 - 33.0 pg 30.4   MCHC  32.0 - 36.0 g/dL 33.5     RDW  11.0 - 15.0 % 13.6   Platelet Count  140 - 400 Thousand/uL 190   Mean Platelet Volume (MPV)  7.5 - 12.5 fL 11.6     CMP 10/4/24  GLUCOSE  65 - 139 mg/dL 112     Urea Nitrogen (BUN)  7 - 25 mg/dL 22   Creatinine  0.50 - 1.05 mg/dL 0.68   EGFR  > OR = 60 mL/min/1.73m2 95   BUN/CREATININE RATIO  6 - 22 (calc) SEE NOTE:   Comment:    Not Reported: BUN and Creatinine are within     reference range.         SODIUM  135 - 146 mmol/L 140   POTASSIUM  3.5 - 5.3 mmol/L 4.2   CHLORIDE  98 - 110 mmol/L 103   Carbon Dioxide (CO2)  20 - 32 mmol/L 26   CALCIUM  8.6 - 10.4 mg/dL 9.5   PROTEIN, TOTAL - QUEST  6.1 - 8.1 g/dL 7.0   ALBUMIN - QUEST  3.6 - 5.1 g/dL 4.6   GLOBULIN - QUEST  1.9 - 3.7 g/dL (calc) 2.4   ALBUMIN/GLOBULIN RATIO - QUEST  1.0 - 2.5 (calc) 1.9   BILIRUBIN, TOTAL - QUEST  0.2 - 1.2 mg/dL 0.5   ALKALINE PHOSPHATASE  37 - 153 U/L 78   AST - QUEST  10 - 35 U/L 22   ALT - QUEST  6 - 29 U/L 19     Lab Results   Component Value Date    HGBA1C 6.0 (H) 10/25/2024      EKG 10/24/24  NSR  Possible left atrial enlargement  RBBB  88 BPM     RCRI  0  , 3.9 % Risk of MACE    Cardiac  HTN -  irbesartan-hydrochlorothiazide HOLD 24 hours prior to surgery               amlodipine Continue DOS     Endocrinology  Hypothyroid -levothyroxine Continue DOS     Hematology       Patient instructed to ambulate as soon as possible postoperatively to  decrease thromboembolic risk.      Initiate mechanical DVT prophylaxis as soon as possible and initiate chemical prophylaxis when deemed safe from a bleeding standpoint post surgery.       VTE prophylaxis per surgical team       Tests ordered in PAT: cbc, cmp done 10/4, A1c,mrsa,EKG   LABS REVIEWED from 10/4: unremarkable     Risk assessment complete.  Patient is scheduled for a intermediate surgical risk procedure.        Preoperative medication instructions were provided and reviewed with the patient.  Any additional testing or evaluation was explained to the patient.  Nothing by mouth instructions were discussed and patient's questions were answered prior to conclusion to this encounter.  Patient verbalized understanding of preoperative instructions given in preadmission testing; discharge instructions available in EMR.    This note was dictated by a speech recognition.  Minor errors may have been detected in a speech recognition.

## 2024-10-25 NOTE — PREPROCEDURE INSTRUCTIONS
Medication List            Accurate as of October 25, 2024 10:32 AM. Always use your most recent med list.                acetaminophen 500 mg tablet  Commonly known as: Tylenol  Notes to patient: Use if needed morning of surgery     amLODIPine 5 mg tablet  Commonly known as: Norvasc  Medication Adjustments for Surgery: Take on the morning of surgery     b complex vitamins capsule  Notes to patient: HOLD 7 Days prior to surgery - LD now if not already stopped     BERBERINE CHLORIDE ORAL  Notes to patient: HOLD 7 Days prior to surgery - LD hold now if not already stopped      calcium carbonate 200 mg calcium chewable tablet  Commonly known as: Tums  Medication Adjustments for Surgery: Do Not take on the morning of surgery     calcium carbonate-vitamin D3 600 mg-5 mcg (200 unit) tablet  Medication Adjustments for Surgery: Do Not take on the morning of surgery     cetirizine 10 mg tablet  Commonly known as: ZyrTEC  Medication Adjustments for Surgery: Take last dose 1 day (24 hours) before surgery     diclofenac sodium 1 % gel  Commonly known as: Voltaren  Medication Adjustments for Surgery: Do Not take on the morning of surgery     garlic 1,000 mg capsule  Notes to patient: HOLD 7 Days prior to surgery - LD stop now if not already stopped      ibuprofen 800 mg tablet  Notes to patient: HOLD 7 Days prior to surgery - LD stop now if not already stopped      irbesartan-hydrochlorothiazide 300-12.5 mg tablet  Commonly known as: Avalide  Medication Adjustments for Surgery: Take last dose 1 day (24 hours) before surgery     krill oil 500 mg capsule  Notes to patient: HOLD 7 Days prior to surgery - LD stop now if not already stopped      levothyroxine 125 mcg tablet  Commonly known as: Synthroid, Levoxyl  Medication Adjustments for Surgery: Take on the morning of surgery     multivitamin tablet  Notes to patient: HOLD 7 Days prior to surgery - LD stop now if not already stopped     NON FORMULARY  Notes to patient: HOLD 7  Days prior to surgery - LD stop now if not already stopped      red yeast rice 600 mg capsule  Notes to patient: HOLD 7 Days prior to surgery - LD stop now if not already stopped      rOPINIRole 0.5 mg tablet  Commonly known as: Requip  Medication Adjustments for Surgery: Take/Use as prescribed                 Concerning above medication instructions - If medication is normally taken at night continue normal schedule - do not take night prior and morning of surgery.     CONTACT SURGEON'S OFFICE IF YOU DEVELOP:  * Fever = 100.4 F   * New respiratory symptoms (e.g. cough, shortness of breath, respiratory distress, sore throat)  * Recent loss of taste or smell  *Flu like symptoms such as headache, fatigue or gastrointestinal symptoms  * You develop any open sores, shingles, burning or painful urination   AND/OR:  * You no longer wish to have the surgery.  * Any other personal circumstances change that may lead to the need to cancel or defer this surgery.  *You were admitted to any hospital within one week of your planned procedure.    SMOKING:  *Quitting smoking can make a huge difference to your health and recovery from surgery.    *If you need help with quitting, call 0-207-QUIT-NOW.    THE DAY BEFORE SURGERY:  *Do not eat any food after midnight the night before surgery/procedure.   *You are permitted to drink clear liquids (i.e. water, black coffee/tea, (no milk or cream) apple juice, and electrolyte drinks (Gatorade)13.5 ounces up to 2 hours before your instructed arrival time to the hospital.  *You may chew gum until  2 hours before your surgery/procedure.    SURGICAL TIME  *You will be contacted between 2 p.m. and 6 p.m. the business day before your surgery with your arrival time.  *If you haven't received a call by 6pm, call [phone].  *Scheduled surgery times may change and you will be notified if this occurs-check your personal voicemail for any updates.    ON THE MORNING OF SURGERY:  *Wear comfortable,  loose fitting clothing.   *Do not use moisturizers, creams, lotions or perfume.  *All jewelry and valuables should be left at home.  *Prosthetic devices such as contact lenses, hearing aids, dentures, eyelash extensions, hairpins and body piercing must be removed before surgery.    BRING WITH YOU:  *Photo ID and insurance card  *Current list of medicines and allergies  *Pacemaker/Defibrillator/Heart stent cards  *CPAP machine and mask  *Slings/splints/crutches  *Copy of your complete Advanced Directive/DHPOA-if applicable  *Neurostimulator implant remote    PARKING AND ARRIVAL:  *Check in at the Main Entrance desk and let them know you are here for surgery.  *You will be directed to the 2nd floor surgical waiting area.    AFTER OUTPATIENT SURGERY:  *A responsible adult MUST accompany you at the time of discharge and stay with you for 24 hours after your surgery.  *You may NOT drive yourself home after surgery.  *You may use a taxi or ride sharing service ([employer], [employer]) to return home ONLY if you are accompanied by a friend or family member.  *Instructions for resuming your medications will be provided by your surgeon.      Home Preoperative Antibacterial Shower     What is a home preoperative antibacterial shower?  This shower is a way of cleaning the skin with a germ killing soap before surgery.  The soap contains chlorhexidine, commonly known as CHG.  CHG is a soap for your skin with germ killing ability.  Let your doctor know if you are allergic to chlorhexidine.    Why do I need to take a preoperative antibacterial shower?  Skin is not sterile.  It is best to try to make your skin as free of germs as possible before surgery.  Proper cleansing with a germ killing soap before surgery can lower the number of germs on your skin.  This helps to reduce the risk of infection at the surgical site.  Following the instructions listed below will help you prepare your skin for surgery.      How do I use the CHG skin  cleanser?  Steps:  Begin using your CHG soap five days before your scheduled surgery on ________________________.    Days 1-4 Shower before bed:  Wash your face and genitals with your normal soap and rinse.    Wash and rinse your hair using the CHG soap. Rinse completely, do not condition your   Hair.          3.    Apply the CHG soap to a clean wet washcloth.  Turn the water off or move away                From the water spray to avoid premature rinsing of the CHG soap as you are applying.     4.   Lather your entire body from the neck down.  Do not use on your face or genitals.   Pay special attention to the area(s) where your incision(s) will be located unless they are on your face.  Avoid scrubbing your skin too hard.  The important point is to have the CHG soap sit on your skin for 3 minutes.    When the 3 minutes are up, turn on the water and rinse the CHG soap off your body completely.   Pat yourself dry with a clean, freshly-laundered towel.  Dress in clean, freshly laundered night clothes.    Be sure to sleep with clean, freshly laundered sheets.  Day 5:  Last shower is the morning before surgery: Follow above Instructions.    NOTE:    *Hair extensions should be removed    *Keep CHG soap out of eyes and ear canals   *DO NOT wash with regular soap on your body after you have used the CHG        soap solution  *DO NOT apply powders, lotions, or perfume.  *Deodorant may be used days 1-4, BUT NOT the day of surgery    Who should I contact if I have any questions regarding the use of CHG soap?  Call the [facility], Preadmission Testing at [phone] if you have any questions.              Patient Information: Pre-Operative Infection Prevention Measures     Why did I have my nose, under my arms and groin swabbed?  The purpose of the swab is to identify Staphylococcus aureus inside your nose or on your skin.  The swab was sent to the laboratory for culture.  A positive swab/culture  for Staphylococcus aureus is called colonization or carriage.      What is Staphylococcus aureus?  Staphylococcus aureus, also known as “staph”, is a germ found on the skin or in the nose of healthy people.  Sometimes Staphylococcus aureus can get into the body and cause an infection.  This can be minor (such as pimples, boils or other skin problems).  It might also be serious (such as blood infection, pneumonia or a surgical site infection).    What is Staphylococcus aureus colonization or carriage?  Colonization or carriage means that a person has the germ but is not sick from it.  These bacteria can be spread on the hands or when breathing or sneezing.    How is Staphylococcus aureus spread?  It is most often spread by close contact with a person or item that carries it.    What happens if my culture is positive for Staphylococcus aureus?  Your doctor/medical team will use this information to guide any antibiotic treatment which may be necessary.  Regardless of the culture results, we will clean the inside of your nose with a betadine swab just before you have your surgery.      Will I get an infection if I have Staphylococcus aureus in my nose or on my skin?  Anyone can get an infection with Staphylococcus aureus.  However, the best way to reduce your risk of infection is to follow the instructions provided to you for the use of your CHG soap and dental rinse.        Who should I contact if I have any questions?  Call the [facility], Preadmission Testing at [phone] if you have any questions.           Patient Information: Oral/Dental Rinse  **This is a prescription; pick it up at your preferred local pharmacy **  What is oral/dental rinse?   It is a mouthwash. It is a way of cleaning the mouth with a germ killing solution before your surgery.  The solution contains chlorhexidine, commonly known as CHG.   It is used inside the mouth to kill a bacteria known as Staphylococcus  aureus.  Let your doctor know if you are allergic to Chlorhexidine.    Why do I need to use CHG oral/dental rinse?  The CHG oral/dental rinse helps to kill a bacteria in your mouth known a Staphylococcus aureus.     This reduces the risk of infection at the surgical site.      Using your CHG oral/dental rinse  STEPS:  Use your CHG oral/dental rinse after you brush your teeth the night before (at bedtime) and the morning of your surgery.  Follow all directions on your prescription label.    Use the cap on the container to measure 15ml (fill cap to fill line)  Swish (gargle if you can) the mouthwash in your mouth for at least 30 seconds, (do not to swallow) spit out  After you use your CHG rinse, do not rinse your mouth with water, drink or eat.  Please refer to prescription label for the appropriate time to resume oral intake  Dental rinse comes in one size bottle: 473ml ~16oz.  You will have leftover    rinse, discard after this use.    What side effects might I have using the CHG oral/dental rinse?  CHG rinse will stick to plaque on the teeth.  Brush and floss just before use.  Teeth brushing will help avoid staining of plaque during use.    Who should I contact if I have questions about the CHG oral/dental rinse?  Please call [facility], Preadmission Testing at [phone] if you have any questions

## 2024-10-27 LAB — STAPHYLOCOCCUS SPEC CULT: NORMAL

## 2024-10-29 ENCOUNTER — ANESTHESIA EVENT (OUTPATIENT)
Dept: OPERATING ROOM | Facility: HOSPITAL | Age: 69
End: 2024-10-29

## 2024-10-30 ENCOUNTER — ANESTHESIA (OUTPATIENT)
Dept: OPERATING ROOM | Facility: HOSPITAL | Age: 69
End: 2024-10-30
Payer: MEDICARE

## 2024-10-30 ENCOUNTER — HOSPITAL ENCOUNTER (OUTPATIENT)
Facility: HOSPITAL | Age: 69
Setting detail: OUTPATIENT SURGERY
Discharge: HOME | End: 2024-10-30
Attending: ORTHOPAEDIC SURGERY | Admitting: ORTHOPAEDIC SURGERY
Payer: MEDICARE

## 2024-10-30 DIAGNOSIS — M19.022 ARTHRITIS OF BOTH ELBOWS: Primary | ICD-10-CM

## 2024-10-30 DIAGNOSIS — M19.021 ARTHRITIS OF BOTH ELBOWS: Primary | ICD-10-CM

## 2024-10-30 PROBLEM — M19.029 ELBOW ARTHRITIS: Status: ACTIVE | Noted: 2024-10-30

## 2024-10-30 PROCEDURE — 7100000011 HC EXTENDED STAY RECOVERY HOURLY - NURSING UNIT

## 2024-10-30 RX ORDER — OXYCODONE HYDROCHLORIDE 5 MG/1
10 TABLET ORAL EVERY 4 HOURS PRN
Status: CANCELLED | OUTPATIENT
Start: 2024-10-30

## 2024-10-30 RX ORDER — ASPIRIN 81 MG/1
81 TABLET ORAL 2 TIMES DAILY
Status: CANCELLED | OUTPATIENT
Start: 2024-10-31 | End: 2024-11-07

## 2024-10-30 RX ORDER — ONDANSETRON HYDROCHLORIDE 2 MG/ML
4 INJECTION, SOLUTION INTRAVENOUS EVERY 8 HOURS PRN
Status: CANCELLED | OUTPATIENT
Start: 2024-10-30

## 2024-10-30 RX ORDER — ACETAMINOPHEN 325 MG/1
650 TABLET ORAL EVERY 6 HOURS SCHEDULED
Status: CANCELLED | OUTPATIENT
Start: 2024-10-30

## 2024-10-30 RX ORDER — DOCUSATE SODIUM 100 MG/1
100 CAPSULE, LIQUID FILLED ORAL 2 TIMES DAILY
Status: CANCELLED | OUTPATIENT
Start: 2024-10-30

## 2024-10-30 RX ORDER — OXYCODONE HYDROCHLORIDE 5 MG/1
5 TABLET ORAL EVERY 6 HOURS PRN
Status: CANCELLED | OUTPATIENT
Start: 2024-10-30

## 2024-10-30 RX ORDER — NALOXONE HYDROCHLORIDE 1 MG/ML
0.2 INJECTION INTRAMUSCULAR; INTRAVENOUS; SUBCUTANEOUS EVERY 5 MIN PRN
Status: CANCELLED | OUTPATIENT
Start: 2024-10-30

## 2024-10-30 RX ORDER — CEFAZOLIN SODIUM 2 G/100ML
2 INJECTION, SOLUTION INTRAVENOUS EVERY 8 HOURS
Status: CANCELLED | OUTPATIENT
Start: 2024-10-30 | End: 2024-10-31

## 2024-10-30 RX ORDER — OXYCODONE AND ACETAMINOPHEN 5; 325 MG/1; MG/1
0.5 TABLET ORAL EVERY 4 HOURS PRN
Status: CANCELLED | OUTPATIENT
Start: 2024-10-30

## 2024-10-30 RX ORDER — ONDANSETRON 4 MG/1
4 TABLET, FILM COATED ORAL EVERY 8 HOURS PRN
Status: CANCELLED | OUTPATIENT
Start: 2024-10-30

## 2024-10-30 RX ORDER — KETOROLAC TROMETHAMINE 30 MG/ML
7.5 INJECTION, SOLUTION INTRAMUSCULAR; INTRAVENOUS EVERY 6 HOURS
Status: CANCELLED | OUTPATIENT
Start: 2024-10-30 | End: 2024-10-30

## 2024-10-30 RX ORDER — SODIUM CHLORIDE, SODIUM LACTATE, POTASSIUM CHLORIDE, CALCIUM CHLORIDE 600; 310; 30; 20 MG/100ML; MG/100ML; MG/100ML; MG/100ML
50 INJECTION, SOLUTION INTRAVENOUS CONTINUOUS
Status: CANCELLED | OUTPATIENT
Start: 2024-10-30 | End: 2024-10-31

## 2024-10-30 ASSESSMENT — COLUMBIA-SUICIDE SEVERITY RATING SCALE - C-SSRS
2. HAVE YOU ACTUALLY HAD ANY THOUGHTS OF KILLING YOURSELF?: NO
6. HAVE YOU EVER DONE ANYTHING, STARTED TO DO ANYTHING, OR PREPARED TO DO ANYTHING TO END YOUR LIFE?: NO
1. IN THE PAST MONTH, HAVE YOU WISHED YOU WERE DEAD OR WISHED YOU COULD GO TO SLEEP AND NOT WAKE UP?: NO

## 2024-10-31 ENCOUNTER — HOSPITAL ENCOUNTER (OUTPATIENT)
Dept: RADIOLOGY | Facility: CLINIC | Age: 69
Discharge: HOME | End: 2024-10-31
Payer: MEDICARE

## 2024-10-31 DIAGNOSIS — M19.021 ARTHRITIS OF BOTH ELBOWS: ICD-10-CM

## 2024-10-31 DIAGNOSIS — M19.022 ARTHRITIS OF BOTH ELBOWS: ICD-10-CM

## 2024-10-31 PROCEDURE — 73200 CT UPPER EXTREMITY W/O DYE: CPT | Mod: RT

## 2024-10-31 PROCEDURE — 73200 CT UPPER EXTREMITY W/O DYE: CPT | Mod: RIGHT SIDE | Performed by: RADIOLOGY

## 2024-11-01 DIAGNOSIS — M19.029 ELBOW ARTHRITIS: Primary | ICD-10-CM

## 2024-11-12 ENCOUNTER — APPOINTMENT (OUTPATIENT)
Dept: ORTHOPEDIC SURGERY | Facility: HOSPITAL | Age: 69
End: 2024-11-12
Payer: MEDICARE

## 2024-11-15 ENCOUNTER — ANESTHESIA EVENT (OUTPATIENT)
Dept: OPERATING ROOM | Facility: HOSPITAL | Age: 69
End: 2024-11-15
Payer: MEDICARE

## 2024-11-15 PROBLEM — Z86.718 CURRENT LONG-TERM USE OF ANTICOAGULANT MEDICATION WITH HISTORY OF DEEP VENOUS THROMBOSIS (DVT): Status: ACTIVE | Noted: 2024-11-15

## 2024-11-15 PROBLEM — R11.2 PONV (POSTOPERATIVE NAUSEA AND VOMITING): Status: ACTIVE | Noted: 2024-11-15

## 2024-11-15 PROBLEM — Z79.01 CURRENT LONG-TERM USE OF ANTICOAGULANT MEDICATION WITH HISTORY OF DEEP VENOUS THROMBOSIS (DVT): Status: ACTIVE | Noted: 2024-11-15

## 2024-11-15 PROBLEM — Z98.890 PONV (POSTOPERATIVE NAUSEA AND VOMITING): Status: ACTIVE | Noted: 2024-11-15

## 2024-11-15 NOTE — ANESTHESIA PREPROCEDURE EVALUATION
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]    Past Medical History:   Diagnosis Date    Arthritis     Cleft lip and cleft palate ([facility])     Diverticulosis     DVT (deep venous thrombosis) (Multi)     Right poipliteal, after TKA    Hearing aid worn     Right ear only    Hypertension     Hypothyroidism     PONV (postoperative nausea and vomiting)     RLS (restless legs syndrome)      Past Surgical History:   Procedure Laterality Date    APPENDECTOMY  1990    CHOLECYSTECTOMY  1999    COLON SURGERY  1990    resection for diverticulitis    DILATION AND CURETTAGE OF UTERUS      1980, 1986 lysis of adhesions    ELBOW FUSION Left 1978    FOOT FUSION Right 08/2023    navicular bone replacement    FOOT FUSION Right 12/2023    REVISION    FOOT SURGERY Bilateral     spherical implants    HARDWARE REMOVAL FOOT / ANKLE Left 1995    LASIK Bilateral 1999    MOUTH SURGERY      multiple surgeries on cleft lip and palate    ORIF ANKLE FRACTURE Left 1994    TOTAL KNEE ARTHROPLASTY Right 2019    TOTAL KNEE ARTHROPLASTY Left     WRIST SURGERY Right 2005     Social History   She reports that she has never smoked. She has never used smokeless tobacco. She reports that she does not drink alcohol and does not use drugs.    Allergies and Medications   Allergies   Allergen Reactions    Clarithromycin Swelling    Sulfa (Sulfonamide Antibiotics) Itching and Rash    Topiramate Rash, Other and Swelling     Other reaction(s): Other: See Comments   Hands and feet go numb    Hands and feet go numb    Cefazolin Rash "    Iodinated Contrast Media Itching and Rash     ITCHED ALL OVER    Morphine Hives, Itching and Rash       Administrations This Visit       aprepitant (Emend) capsule 40 mg       Admin Date  11/18/2024 Action  Given Dose  40 mg Route  oral Documented By  Yoko Finn LPN                   Current Outpatient Medications   Medication Instructions    acetaminophen (TYLENOL) 1,000 mg, Every 8 hours PRN    amLODIPine (NORVASC) 5 mg, Daily    b complex vitamins capsule 1 capsule, Daily    BERBERINE CHLORIDE ORAL 1,500 mg, Daily    calcium carbonate (Tums) 200 mg calcium chewable tablet 1 tablet, 4 times daily PRN    calcium carbonate-vitamin D3 600 mg-5 mcg (200 unit) tablet 2 tablets, Daily    cetirizine (ZYRTEC) 10 mg, Daily before breakfast    chlorhexidine (Peridex) 0.12 % solution 15 ml swish and spit for 30 seconds night prior to surgery and morning of surgery    diclofenac sodium (Voltaren) 1 % gel 2 times daily PRN    garlic 1,000 mg capsule 1 capsule, Daily    ibuprofen 800 mg, 3 times daily    irbesartan-hydrochlorothiazide (Avalide) 300-12.5 mg tablet 1 tablet, Daily RT    krill oil 500 mg capsule 1 capsule, Daily    levothyroxine (Synthroid, Levoxyl) 125 mcg tablet 1 tablet, Daily (0630)    multivitamin tablet 1 tablet, Daily    NON FORMULARY 2 each, Daily    red yeast rice 600 mg capsule 2 capsules, Daily    rOPINIRole (REQUIP) 0.5 mg, Nightly     Recent Labs     10/25/24  1110   HGBA1C 6.0*       Lab Results   Component Value Date    STAPHMRSASCR No Staphylococcus aureus isolated 10/25/2024     Encounter Date: 10/25/24   ECG 12 lead (Clinic Performed)   Result Value    Ventricular Rate 88    Atrial Rate 88    KS Interval 130    QRS Duration 116    QT Interval 382    QTC Calculation(Bazett) 462    P Axis 57    R Axis 41    T Axis 13    QRS Count 15    Q Onset 224    P Onset 159    P Offset 208    T Offset 415    QTC Fredericia 434    Narrative    Normal sinus rhythm  Possible Left atrial enlargement  Right  bundle branch block  Abnormal ECG  No previous ECGs available  Confirmed by Wesley La ([id number]) on 10/25/2024 12:28:10 PM          Visit Vitals  /69   Pulse 77   Temp 36.4 °C (97.5 °F) (Temporal)   Resp 18   Ht 1.524 m (5')   Wt 80 kg (176 lb 5.9 oz)   SpO2 96%   BMI 34.44 kg/m²   OB Status Postmenopausal   Smoking Status Never   BSA 1.84 m²     Medical Gas Therapy: None (Room air)            Relevant Problems   Anesthesia   (+) PONV (postoperative nausea and vomiting)      Cardiac   (+) Hypertension      Endocrine   (+) Hypothyroidism   (+) Obesity (BMI 30.0-34.9)   (+) Prediabetes      Hematology   (+) DVT (deep venous thrombosis) (Multi)       Clinical information reviewed:   Tobacco  Allergies  Meds  Problems  Med Hx  Surg Hx  OB Status    Fam Hx  Soc Hx        NPO Detail:  NPO/Void Status  Date of Last Liquid: 11/18/24  Time of Last Liquid: 0330  Date of Last Solid: 11/17/24  Time of Last Solid: 2130         Physical Exam    Airway  Mallampati: III  TM distance: >3 FB  Neck ROM: full  Comments: Short neck   Cardiovascular   Rhythm: regular  Rate: normal     Dental - normal exam     Pulmonary   Comments: Normal RR  Non-labored respiration    Abdominal   (+) obese             Anesthesia Plan    History of general anesthesia?: yes  History of complications of general anesthesia?: yes    ASA 2     general   (General with ETT. Nerve block requested by surgeon for post -op analgesia. Standard ASA monitoring)  intravenous induction   Postoperative administration of opioids is intended.  Anesthetic plan and risks discussed with patient.    Plan discussed with CAA and CRNA.

## 2024-11-18 ENCOUNTER — APPOINTMENT (OUTPATIENT)
Dept: RADIOLOGY | Facility: HOSPITAL | Age: 69
End: 2024-11-18
Payer: MEDICARE

## 2024-11-18 ENCOUNTER — ANESTHESIA (OUTPATIENT)
Dept: OPERATING ROOM | Facility: HOSPITAL | Age: 69
End: 2024-11-18
Payer: MEDICARE

## 2024-11-18 ENCOUNTER — HOSPITAL ENCOUNTER (OUTPATIENT)
Facility: HOSPITAL | Age: 69
Discharge: HOME | End: 2024-11-19
Attending: ORTHOPAEDIC SURGERY | Admitting: ORTHOPAEDIC SURGERY
Payer: MEDICARE

## 2024-11-18 DIAGNOSIS — Z98.890 POST-OPERATIVE STATE: ICD-10-CM

## 2024-11-18 DIAGNOSIS — M19.029 ELBOW ARTHRITIS: Primary | ICD-10-CM

## 2024-11-18 DIAGNOSIS — G89.18 ACUTE POST-OPERATIVE PAIN: Primary | ICD-10-CM

## 2024-11-18 PROBLEM — R73.03 PREDIABETES: Status: ACTIVE | Noted: 2024-11-18

## 2024-11-18 PROBLEM — E66.811 OBESITY (BMI 30.0-34.9): Status: ACTIVE | Noted: 2024-11-18

## 2024-11-18 PROCEDURE — 2500000004 HC RX 250 GENERAL PHARMACY W/ HCPCS (ALT 636 FOR OP/ED): Performed by: ORTHOPAEDIC SURGERY

## 2024-11-18 PROCEDURE — 2500000004 HC RX 250 GENERAL PHARMACY W/ HCPCS (ALT 636 FOR OP/ED): Mod: JZ | Performed by: NURSE PRACTITIONER

## 2024-11-18 PROCEDURE — A24363 PR ARTHROPLASTY,ELBOW,TOTAL PROSTH REPL: Performed by: ANESTHESIOLOGIST ASSISTANT

## 2024-11-18 PROCEDURE — A24363 PR ARTHROPLASTY,ELBOW,TOTAL PROSTH REPL: Performed by: ANESTHESIOLOGY

## 2024-11-18 PROCEDURE — C1713 ANCHOR/SCREW BN/BN,TIS/BN: HCPCS | Performed by: ORTHOPAEDIC SURGERY

## 2024-11-18 PROCEDURE — 2500000004 HC RX 250 GENERAL PHARMACY W/ HCPCS (ALT 636 FOR OP/ED): Performed by: ANESTHESIOLOGIST ASSISTANT

## 2024-11-18 PROCEDURE — RXMED WILLOW AMBULATORY MEDICATION CHARGE

## 2024-11-18 PROCEDURE — 76000 FLUOROSCOPY <1 HR PHYS/QHP: CPT | Mod: LT

## 2024-11-18 PROCEDURE — 64718 REVISE ULNAR NERVE AT ELBOW: CPT | Performed by: NURSE PRACTITIONER

## 2024-11-18 PROCEDURE — 73070 X-RAY EXAM OF ELBOW: CPT | Mod: LEFT SIDE | Performed by: RADIOLOGY

## 2024-11-18 PROCEDURE — 7100000011 HC EXTENDED STAY RECOVERY HOURLY - NURSING UNIT

## 2024-11-18 PROCEDURE — 3600000004 HC OR TIME - INITIAL BASE CHARGE - PROCEDURE LEVEL FOUR: Performed by: ORTHOPAEDIC SURGERY

## 2024-11-18 PROCEDURE — 24363 REPLACE ELBOW JOINT: CPT | Performed by: ORTHOPAEDIC SURGERY

## 2024-11-18 PROCEDURE — 2720000007 HC OR 272 NO HCPCS: Performed by: ORTHOPAEDIC SURGERY

## 2024-11-18 PROCEDURE — 3600000009 HC OR TIME - EACH INCREMENTAL 1 MINUTE - PROCEDURE LEVEL FOUR: Performed by: ORTHOPAEDIC SURGERY

## 2024-11-18 PROCEDURE — 3700000002 HC GENERAL ANESTHESIA TIME - EACH INCREMENTAL 1 MINUTE: Performed by: ORTHOPAEDIC SURGERY

## 2024-11-18 PROCEDURE — 9420000001 HC RT PATIENT EDUCATION 5 MIN

## 2024-11-18 PROCEDURE — 2500000005 HC RX 250 GENERAL PHARMACY W/O HCPCS: Performed by: ORTHOPAEDIC SURGERY

## 2024-11-18 PROCEDURE — 2500000002 HC RX 250 W HCPCS SELF ADMINISTERED DRUGS (ALT 637 FOR MEDICARE OP, ALT 636 FOR OP/ED): Performed by: NURSE PRACTITIONER

## 2024-11-18 PROCEDURE — 2500000001 HC RX 250 WO HCPCS SELF ADMINISTERED DRUGS (ALT 637 FOR MEDICARE OP): Performed by: NURSE PRACTITIONER

## 2024-11-18 PROCEDURE — C1776 JOINT DEVICE (IMPLANTABLE): HCPCS | Performed by: ORTHOPAEDIC SURGERY

## 2024-11-18 PROCEDURE — 7100000001 HC RECOVERY ROOM TIME - INITIAL BASE CHARGE: Performed by: ORTHOPAEDIC SURGERY

## 2024-11-18 PROCEDURE — 73070 X-RAY EXAM OF ELBOW: CPT | Mod: LT

## 2024-11-18 PROCEDURE — 2500000002 HC RX 250 W HCPCS SELF ADMINISTERED DRUGS (ALT 637 FOR MEDICARE OP, ALT 636 FOR OP/ED): Performed by: ANESTHESIOLOGY

## 2024-11-18 PROCEDURE — 24363 REPLACE ELBOW JOINT: CPT | Performed by: NURSE PRACTITIONER

## 2024-11-18 PROCEDURE — 64718 REVISE ULNAR NERVE AT ELBOW: CPT | Performed by: ORTHOPAEDIC SURGERY

## 2024-11-18 PROCEDURE — 3700000001 HC GENERAL ANESTHESIA TIME - INITIAL BASE CHARGE: Performed by: ORTHOPAEDIC SURGERY

## 2024-11-18 PROCEDURE — 2500000005 HC RX 250 GENERAL PHARMACY W/O HCPCS: Performed by: ANESTHESIOLOGIST ASSISTANT

## 2024-11-18 PROCEDURE — 2780000003 HC OR 278 NO HCPCS: Performed by: ORTHOPAEDIC SURGERY

## 2024-11-18 PROCEDURE — 7100000002 HC RECOVERY ROOM TIME - EACH INCREMENTAL 1 MINUTE: Performed by: ORTHOPAEDIC SURGERY

## 2024-11-18 PROCEDURE — 64415 NJX AA&/STRD BRCH PLXS IMG: CPT | Performed by: ANESTHESIOLOGY

## 2024-11-18 DEVICE — CEMENT, BONE, TOBRAMYCIN, FULL DOSE: Type: IMPLANTABLE DEVICE | Site: ELBOW | Status: FUNCTIONAL

## 2024-11-18 DEVICE — IMPLANTABLE DEVICE: Type: IMPLANTABLE DEVICE | Site: ELBOW | Status: FUNCTIONAL

## 2024-11-18 RX ORDER — HYDROCODONE BITARTRATE AND ACETAMINOPHEN 5; 325 MG/1; MG/1
2 TABLET ORAL EVERY 4 HOURS PRN
Status: DISCONTINUED | OUTPATIENT
Start: 2024-11-18 | End: 2024-11-19 | Stop reason: HOSPADM

## 2024-11-18 RX ORDER — HYDRALAZINE HYDROCHLORIDE 20 MG/ML
5 INJECTION INTRAMUSCULAR; INTRAVENOUS EVERY 30 MIN PRN
Status: DISCONTINUED | OUTPATIENT
Start: 2024-11-18 | End: 2024-11-18 | Stop reason: HOSPADM

## 2024-11-18 RX ORDER — AMLODIPINE BESYLATE 5 MG/1
5 TABLET ORAL DAILY
Status: DISCONTINUED | OUTPATIENT
Start: 2024-11-18 | End: 2024-11-19 | Stop reason: HOSPADM

## 2024-11-18 RX ORDER — SODIUM CHLORIDE, SODIUM LACTATE, POTASSIUM CHLORIDE, CALCIUM CHLORIDE 600; 310; 30; 20 MG/100ML; MG/100ML; MG/100ML; MG/100ML
100 INJECTION, SOLUTION INTRAVENOUS CONTINUOUS
Status: DISCONTINUED | OUTPATIENT
Start: 2024-11-18 | End: 2024-11-18 | Stop reason: HOSPADM

## 2024-11-18 RX ORDER — GLYCOPYRROLATE 0.2 MG/ML
INJECTION INTRAMUSCULAR; INTRAVENOUS AS NEEDED
Status: DISCONTINUED | OUTPATIENT
Start: 2024-11-18 | End: 2024-11-18

## 2024-11-18 RX ORDER — DOCUSATE SODIUM 100 MG/1
100 CAPSULE, LIQUID FILLED ORAL 2 TIMES DAILY
Qty: 30 CAPSULE | Refills: 0 | Status: SHIPPED | OUTPATIENT
Start: 2024-11-18 | End: 2024-12-03

## 2024-11-18 RX ORDER — APREPITANT 40 MG/1
40 CAPSULE ORAL DAILY
Status: DISCONTINUED | OUTPATIENT
Start: 2024-11-18 | End: 2024-11-18 | Stop reason: HOSPADM

## 2024-11-18 RX ORDER — METHYLENE BLUE 5 MG/ML
INJECTION INTRAVENOUS AS NEEDED
Status: DISCONTINUED | OUTPATIENT
Start: 2024-11-18 | End: 2024-11-18 | Stop reason: HOSPADM

## 2024-11-18 RX ORDER — CEFAZOLIN SODIUM 2 G/100ML
2 INJECTION, SOLUTION INTRAVENOUS EVERY 8 HOURS
Status: COMPLETED | OUTPATIENT
Start: 2024-11-18 | End: 2024-11-19

## 2024-11-18 RX ORDER — ROPINIROLE 0.25 MG/1
0.5 TABLET, FILM COATED ORAL NIGHTLY
Status: DISCONTINUED | OUTPATIENT
Start: 2024-11-18 | End: 2024-11-19 | Stop reason: HOSPADM

## 2024-11-18 RX ORDER — OXYCODONE AND ACETAMINOPHEN 5; 325 MG/1; MG/1
1 TABLET ORAL EVERY 4 HOURS PRN
Qty: 36 TABLET | Refills: 0 | Status: SHIPPED | OUTPATIENT
Start: 2024-11-18 | End: 2024-11-25

## 2024-11-18 RX ORDER — CEFAZOLIN 1 G/1
INJECTION, POWDER, FOR SOLUTION INTRAVENOUS AS NEEDED
Status: DISCONTINUED | OUTPATIENT
Start: 2024-11-18 | End: 2024-11-18

## 2024-11-18 RX ORDER — FENTANYL CITRATE 50 UG/ML
INJECTION, SOLUTION INTRAMUSCULAR; INTRAVENOUS AS NEEDED
Status: DISCONTINUED | OUTPATIENT
Start: 2024-11-18 | End: 2024-11-18

## 2024-11-18 RX ORDER — SODIUM CHLORIDE 0.9 G/100ML
IRRIGANT IRRIGATION AS NEEDED
Status: DISCONTINUED | OUTPATIENT
Start: 2024-11-18 | End: 2024-11-18 | Stop reason: HOSPADM

## 2024-11-18 RX ORDER — CETIRIZINE HYDROCHLORIDE 10 MG/1
10 TABLET ORAL
Status: DISCONTINUED | OUTPATIENT
Start: 2024-11-19 | End: 2024-11-19 | Stop reason: HOSPADM

## 2024-11-18 RX ORDER — ASPIRIN 81 MG/1
81 TABLET ORAL DAILY
Status: DISCONTINUED | OUTPATIENT
Start: 2024-11-19 | End: 2024-11-19 | Stop reason: HOSPADM

## 2024-11-18 RX ORDER — ASPIRIN 81 MG/1
81 TABLET ORAL DAILY
Qty: 14 TABLET | Refills: 0 | Status: SHIPPED | OUTPATIENT
Start: 2024-11-18 | End: 2024-12-02

## 2024-11-18 RX ORDER — ONDANSETRON HYDROCHLORIDE 2 MG/ML
INJECTION, SOLUTION INTRAVENOUS AS NEEDED
Status: DISCONTINUED | OUTPATIENT
Start: 2024-11-18 | End: 2024-11-18

## 2024-11-18 RX ORDER — KETOROLAC TROMETHAMINE 30 MG/ML
INJECTION, SOLUTION INTRAMUSCULAR; INTRAVENOUS AS NEEDED
Status: DISCONTINUED | OUTPATIENT
Start: 2024-11-18 | End: 2024-11-18

## 2024-11-18 RX ORDER — PHENYLEPHRINE HCL IN 0.9% NACL 1 MG/10 ML
SYRINGE (ML) INTRAVENOUS AS NEEDED
Status: DISCONTINUED | OUTPATIENT
Start: 2024-11-18 | End: 2024-11-18

## 2024-11-18 RX ORDER — OXYCODONE AND ACETAMINOPHEN 5; 325 MG/1; MG/1
0.5 TABLET ORAL EVERY 4 HOURS PRN
Status: DISCONTINUED | OUTPATIENT
Start: 2024-11-18 | End: 2024-11-19 | Stop reason: HOSPADM

## 2024-11-18 RX ORDER — OXYCODONE HYDROCHLORIDE 5 MG/1
5 TABLET ORAL
Status: DISCONTINUED | OUTPATIENT
Start: 2024-11-18 | End: 2024-11-18 | Stop reason: HOSPADM

## 2024-11-18 RX ORDER — DROPERIDOL 2.5 MG/ML
0.62 INJECTION, SOLUTION INTRAMUSCULAR; INTRAVENOUS ONCE AS NEEDED
Status: DISCONTINUED | OUTPATIENT
Start: 2024-11-18 | End: 2024-11-18 | Stop reason: HOSPADM

## 2024-11-18 RX ORDER — FERROUS SULFATE, DRIED 160(50) MG
2 TABLET, EXTENDED RELEASE ORAL DAILY
Status: DISCONTINUED | OUTPATIENT
Start: 2024-11-18 | End: 2024-11-19 | Stop reason: HOSPADM

## 2024-11-18 RX ORDER — NALOXONE HYDROCHLORIDE 0.4 MG/ML
0.2 INJECTION, SOLUTION INTRAMUSCULAR; INTRAVENOUS; SUBCUTANEOUS EVERY 5 MIN PRN
Status: DISCONTINUED | OUTPATIENT
Start: 2024-11-18 | End: 2024-11-19 | Stop reason: HOSPADM

## 2024-11-18 RX ORDER — HYDROMORPHONE HYDROCHLORIDE 1 MG/ML
0.5 INJECTION, SOLUTION INTRAMUSCULAR; INTRAVENOUS; SUBCUTANEOUS EVERY 5 MIN PRN
Status: DISCONTINUED | OUTPATIENT
Start: 2024-11-18 | End: 2024-11-18 | Stop reason: HOSPADM

## 2024-11-18 RX ORDER — IRBESARTAN AND HYDROCHLOROTHIAZIDE 300; 12.5 MG/1; MG/1
1 TABLET, FILM COATED ORAL
Status: DISCONTINUED | OUTPATIENT
Start: 2024-11-19 | End: 2024-11-18 | Stop reason: CLARIF

## 2024-11-18 RX ORDER — ROCURONIUM BROMIDE 10 MG/ML
INJECTION, SOLUTION INTRAVENOUS AS NEEDED
Status: DISCONTINUED | OUTPATIENT
Start: 2024-11-18 | End: 2024-11-18

## 2024-11-18 RX ORDER — LIDOCAINE HYDROCHLORIDE 20 MG/ML
INJECTION, SOLUTION EPIDURAL; INFILTRATION; INTRACAUDAL; PERINEURAL AS NEEDED
Status: DISCONTINUED | OUTPATIENT
Start: 2024-11-18 | End: 2024-11-18

## 2024-11-18 RX ORDER — CALCIUM CARBONATE 200(500)MG
1 TABLET,CHEWABLE ORAL 4 TIMES DAILY PRN
Status: DISCONTINUED | OUTPATIENT
Start: 2024-11-18 | End: 2024-11-19 | Stop reason: HOSPADM

## 2024-11-18 RX ORDER — ONDANSETRON HYDROCHLORIDE 2 MG/ML
4 INJECTION, SOLUTION INTRAVENOUS ONCE AS NEEDED
Status: DISCONTINUED | OUTPATIENT
Start: 2024-11-18 | End: 2024-11-18 | Stop reason: HOSPADM

## 2024-11-18 RX ORDER — HYDROCODONE BITARTRATE AND ACETAMINOPHEN 5; 325 MG/1; MG/1
1 TABLET ORAL EVERY 4 HOURS PRN
Status: DISCONTINUED | OUTPATIENT
Start: 2024-11-18 | End: 2024-11-19 | Stop reason: HOSPADM

## 2024-11-18 RX ORDER — TRANEXAMIC ACID 100 MG/ML
INJECTION, SOLUTION INTRAVENOUS AS NEEDED
Status: DISCONTINUED | OUTPATIENT
Start: 2024-11-18 | End: 2024-11-18

## 2024-11-18 RX ORDER — HYDROMORPHONE HYDROCHLORIDE 1 MG/ML
0.25 INJECTION, SOLUTION INTRAMUSCULAR; INTRAVENOUS; SUBCUTANEOUS EVERY 5 MIN PRN
Status: DISCONTINUED | OUTPATIENT
Start: 2024-11-18 | End: 2024-11-18 | Stop reason: HOSPADM

## 2024-11-18 RX ORDER — MIDAZOLAM HYDROCHLORIDE 1 MG/ML
INJECTION, SOLUTION INTRAMUSCULAR; INTRAVENOUS AS NEEDED
Status: DISCONTINUED | OUTPATIENT
Start: 2024-11-18 | End: 2024-11-18

## 2024-11-18 RX ORDER — ACETAMINOPHEN 325 MG/1
650 TABLET ORAL EVERY 6 HOURS SCHEDULED
Status: DISCONTINUED | OUTPATIENT
Start: 2024-11-18 | End: 2024-11-19 | Stop reason: HOSPADM

## 2024-11-18 RX ORDER — DOCUSATE SODIUM 100 MG/1
100 CAPSULE, LIQUID FILLED ORAL 2 TIMES DAILY
Status: DISCONTINUED | OUTPATIENT
Start: 2024-11-18 | End: 2024-11-19 | Stop reason: HOSPADM

## 2024-11-18 RX ORDER — ONDANSETRON 4 MG/1
4 TABLET, FILM COATED ORAL EVERY 8 HOURS PRN
Status: DISCONTINUED | OUTPATIENT
Start: 2024-11-18 | End: 2024-11-19 | Stop reason: HOSPADM

## 2024-11-18 RX ORDER — DIPHENHYDRAMINE HYDROCHLORIDE 50 MG/ML
INJECTION INTRAMUSCULAR; INTRAVENOUS AS NEEDED
Status: DISCONTINUED | OUTPATIENT
Start: 2024-11-18 | End: 2024-11-18

## 2024-11-18 RX ORDER — SODIUM CHLORIDE, SODIUM LACTATE, POTASSIUM CHLORIDE, CALCIUM CHLORIDE 600; 310; 30; 20 MG/100ML; MG/100ML; MG/100ML; MG/100ML
50 INJECTION, SOLUTION INTRAVENOUS CONTINUOUS
Status: ACTIVE | OUTPATIENT
Start: 2024-11-18 | End: 2024-11-19

## 2024-11-18 RX ORDER — PROCHLORPERAZINE EDISYLATE 5 MG/ML
5 INJECTION INTRAMUSCULAR; INTRAVENOUS ONCE AS NEEDED
Status: DISCONTINUED | OUTPATIENT
Start: 2024-11-18 | End: 2024-11-18 | Stop reason: HOSPADM

## 2024-11-18 RX ORDER — PROPOFOL 10 MG/ML
INJECTION, EMULSION INTRAVENOUS AS NEEDED
Status: DISCONTINUED | OUTPATIENT
Start: 2024-11-18 | End: 2024-11-18

## 2024-11-18 RX ORDER — LEVOTHYROXINE SODIUM 125 UG/1
125 TABLET ORAL
Status: DISCONTINUED | OUTPATIENT
Start: 2024-11-18 | End: 2024-11-19 | Stop reason: HOSPADM

## 2024-11-18 RX ORDER — ONDANSETRON HYDROCHLORIDE 2 MG/ML
4 INJECTION, SOLUTION INTRAVENOUS EVERY 8 HOURS PRN
Status: DISCONTINUED | OUTPATIENT
Start: 2024-11-18 | End: 2024-11-19 | Stop reason: HOSPADM

## 2024-11-18 RX ORDER — KETOROLAC TROMETHAMINE 30 MG/ML
7.5 INJECTION, SOLUTION INTRAMUSCULAR; INTRAVENOUS EVERY 6 HOURS
Status: COMPLETED | OUTPATIENT
Start: 2024-11-18 | End: 2024-11-19

## 2024-11-18 SDOH — SOCIAL STABILITY: SOCIAL INSECURITY
WITHIN THE LAST YEAR, HAVE YOU BEEN RAPED OR FORCED TO HAVE ANY KIND OF SEXUAL ACTIVITY BY YOUR PARTNER OR EX-PARTNER?: NO

## 2024-11-18 SDOH — ECONOMIC STABILITY: INCOME INSECURITY: IN THE PAST 12 MONTHS HAS THE ELECTRIC, GAS, OIL, OR WATER COMPANY THREATENED TO SHUT OFF SERVICES IN YOUR HOME?: NO

## 2024-11-18 SDOH — ECONOMIC STABILITY: HOUSING INSECURITY: IN THE LAST 12 MONTHS, WAS THERE A TIME WHEN YOU WERE NOT ABLE TO PAY THE MORTGAGE OR RENT ON TIME?: NO

## 2024-11-18 SDOH — SOCIAL STABILITY: SOCIAL INSECURITY: ABUSE: ADULT

## 2024-11-18 SDOH — ECONOMIC STABILITY: HOUSING INSECURITY: IN THE PAST 12 MONTHS, HOW MANY TIMES HAVE YOU MOVED WHERE YOU WERE LIVING?: 0

## 2024-11-18 SDOH — SOCIAL STABILITY: SOCIAL INSECURITY: ARE YOU MARRIED, WIDOWED, DIVORCED, SEPARATED, NEVER MARRIED, OR LIVING WITH A PARTNER?: MARRIED

## 2024-11-18 SDOH — ECONOMIC STABILITY: FOOD INSECURITY: HOW HARD IS IT FOR YOU TO PAY FOR THE VERY BASICS LIKE FOOD, HOUSING, MEDICAL CARE, AND HEATING?: NOT VERY HARD

## 2024-11-18 SDOH — ECONOMIC STABILITY: HOUSING INSECURITY: AT ANY TIME IN THE PAST 12 MONTHS, WERE YOU HOMELESS OR LIVING IN A SHELTER (INCLUDING NOW)?: NO

## 2024-11-18 SDOH — SOCIAL STABILITY: SOCIAL INSECURITY
WITHIN THE LAST YEAR, HAVE YOU BEEN KICKED, HIT, SLAPPED, OR OTHERWISE PHYSICALLY HURT BY YOUR PARTNER OR EX-PARTNER?: NO

## 2024-11-18 SDOH — SOCIAL STABILITY: SOCIAL INSECURITY: WITHIN THE LAST YEAR, HAVE YOU BEEN HUMILIATED OR EMOTIONALLY ABUSED IN OTHER WAYS BY YOUR PARTNER OR EX-PARTNER?: NO

## 2024-11-18 SDOH — SOCIAL STABILITY: SOCIAL INSECURITY: WITHIN THE LAST YEAR, HAVE YOU BEEN AFRAID OF YOUR PARTNER OR EX-PARTNER?: NO

## 2024-11-18 SDOH — SOCIAL STABILITY: SOCIAL INSECURITY: WERE YOU ABLE TO COMPLETE ALL THE BEHAVIORAL HEALTH SCREENINGS?: YES

## 2024-11-18 SDOH — ECONOMIC STABILITY: FOOD INSECURITY: WITHIN THE PAST 12 MONTHS, YOU WORRIED THAT YOUR FOOD WOULD RUN OUT BEFORE YOU GOT THE MONEY TO BUY MORE.: NEVER TRUE

## 2024-11-18 SDOH — SOCIAL STABILITY: SOCIAL INSECURITY: ARE YOU OR HAVE YOU BEEN THREATENED OR ABUSED PHYSICALLY, EMOTIONALLY, OR SEXUALLY BY ANYONE?: NO

## 2024-11-18 SDOH — ECONOMIC STABILITY: FOOD INSECURITY: WITHIN THE PAST 12 MONTHS, THE FOOD YOU BOUGHT JUST DIDN'T LAST AND YOU DIDN'T HAVE MONEY TO GET MORE.: NEVER TRUE

## 2024-11-18 SDOH — SOCIAL STABILITY: SOCIAL INSECURITY: DO YOU FEEL ANYONE HAS EXPLOITED OR TAKEN ADVANTAGE OF YOU FINANCIALLY OR OF YOUR PERSONAL PROPERTY?: NO

## 2024-11-18 SDOH — HEALTH STABILITY: MENTAL HEALTH: HOW OFTEN DO YOU HAVE SIX OR MORE DRINKS ON ONE OCCASION?: NEVER

## 2024-11-18 SDOH — HEALTH STABILITY: MENTAL HEALTH: HOW OFTEN DO YOU HAVE A DRINK CONTAINING ALCOHOL?: NEVER

## 2024-11-18 SDOH — ECONOMIC STABILITY: FOOD INSECURITY: HOW HARD IS IT FOR YOU TO PAY FOR THE VERY BASICS LIKE FOOD, HOUSING, MEDICAL CARE, AND HEATING?: NOT HARD AT ALL

## 2024-11-18 SDOH — SOCIAL STABILITY: SOCIAL INSECURITY: HAS ANYONE EVER THREATENED TO HURT YOUR FAMILY OR YOUR PETS?: NO

## 2024-11-18 SDOH — SOCIAL STABILITY: SOCIAL INSECURITY: ARE THERE ANY APPARENT SIGNS OF INJURIES/BEHAVIORS THAT COULD BE RELATED TO ABUSE/NEGLECT?: NO

## 2024-11-18 SDOH — SOCIAL STABILITY: SOCIAL INSECURITY: HAVE YOU HAD THOUGHTS OF HARMING ANYONE ELSE?: NO

## 2024-11-18 SDOH — HEALTH STABILITY: MENTAL HEALTH: HOW MANY DRINKS CONTAINING ALCOHOL DO YOU HAVE ON A TYPICAL DAY WHEN YOU ARE DRINKING?: PATIENT DOES NOT DRINK

## 2024-11-18 SDOH — SOCIAL STABILITY: SOCIAL INSECURITY: DOES ANYONE TRY TO KEEP YOU FROM HAVING/CONTACTING OTHER FRIENDS OR DOING THINGS OUTSIDE YOUR HOME?: NO

## 2024-11-18 SDOH — SOCIAL STABILITY: SOCIAL INSECURITY: HAVE YOU HAD ANY THOUGHTS OF HARMING ANYONE ELSE?: NO

## 2024-11-18 SDOH — SOCIAL STABILITY: SOCIAL INSECURITY: DO YOU FEEL UNSAFE GOING BACK TO THE PLACE WHERE YOU ARE LIVING?: NO

## 2024-11-18 SDOH — ECONOMIC STABILITY: TRANSPORTATION INSECURITY: IN THE PAST 12 MONTHS, HAS LACK OF TRANSPORTATION KEPT YOU FROM MEDICAL APPOINTMENTS OR FROM GETTING MEDICATIONS?: NO

## 2024-11-18 ASSESSMENT — PAIN - FUNCTIONAL ASSESSMENT
PAIN_FUNCTIONAL_ASSESSMENT: 0-10

## 2024-11-18 ASSESSMENT — ACTIVITIES OF DAILY LIVING (ADL)
PATIENT'S MEMORY ADEQUATE TO SAFELY COMPLETE DAILY ACTIVITIES?: YES
WALKS IN HOME: NEEDS ASSISTANCE
JUDGMENT_ADEQUATE_SAFELY_COMPLETE_DAILY_ACTIVITIES: YES
LACK_OF_TRANSPORTATION: NO
BATHING: NEEDS ASSISTANCE
GROOMING: NEEDS ASSISTANCE
TOILETING: NEEDS ASSISTANCE
ADEQUATE_TO_COMPLETE_ADL: YES
HEARING - LEFT EAR: FUNCTIONAL
HEARING - RIGHT EAR: FUNCTIONAL
LACK_OF_TRANSPORTATION: NO
FEEDING YOURSELF: NEEDS ASSISTANCE
DRESSING YOURSELF: NEEDS ASSISTANCE

## 2024-11-18 ASSESSMENT — PAIN SCALES - GENERAL
PAINLEVEL_OUTOF10: 0 - NO PAIN
PAINLEVEL_OUTOF10: 5 - MODERATE PAIN

## 2024-11-18 ASSESSMENT — COGNITIVE AND FUNCTIONAL STATUS - GENERAL
HELP NEEDED FOR BATHING: A LITTLE
DAILY ACTIVITIY SCORE: 18
PERSONAL GROOMING: A LITTLE
MOVING FROM LYING ON BACK TO SITTING ON SIDE OF FLAT BED WITH BEDRAILS: A LITTLE
DRESSING REGULAR UPPER BODY CLOTHING: A LITTLE
MOVING TO AND FROM BED TO CHAIR: A LITTLE
TOILETING: A LITTLE
WALKING IN HOSPITAL ROOM: A LITTLE
EATING MEALS: A LITTLE
MOBILITY SCORE: 18
TURNING FROM BACK TO SIDE WHILE IN FLAT BAD: A LITTLE
DRESSING REGULAR LOWER BODY CLOTHING: A LITTLE
CLIMB 3 TO 5 STEPS WITH RAILING: A LITTLE
PATIENT BASELINE BEDBOUND: NO
STANDING UP FROM CHAIR USING ARMS: A LITTLE

## 2024-11-18 ASSESSMENT — LIFESTYLE VARIABLES
HOW OFTEN DO YOU HAVE 6 OR MORE DRINKS ON ONE OCCASION: NEVER
SKIP TO QUESTIONS 9-10: 1
AUDIT-C TOTAL SCORE: 0
AUDIT-C TOTAL SCORE: 0
HOW OFTEN DO YOU HAVE A DRINK CONTAINING ALCOHOL: NEVER
AUDIT-C TOTAL SCORE: 0
SKIP TO QUESTIONS 9-10: 1
HOW MANY STANDARD DRINKS CONTAINING ALCOHOL DO YOU HAVE ON A TYPICAL DAY: PATIENT DOES NOT DRINK

## 2024-11-18 ASSESSMENT — COLUMBIA-SUICIDE SEVERITY RATING SCALE - C-SSRS
1. IN THE PAST MONTH, HAVE YOU WISHED YOU WERE DEAD OR WISHED YOU COULD GO TO SLEEP AND NOT WAKE UP?: NO
6. HAVE YOU EVER DONE ANYTHING, STARTED TO DO ANYTHING, OR PREPARED TO DO ANYTHING TO END YOUR LIFE?: NO
2. HAVE YOU ACTUALLY HAD ANY THOUGHTS OF KILLING YOURSELF?: NO

## 2024-11-18 ASSESSMENT — PATIENT HEALTH QUESTIONNAIRE - PHQ9
2. FEELING DOWN, DEPRESSED OR HOPELESS: NOT AT ALL
1. LITTLE INTEREST OR PLEASURE IN DOING THINGS: NOT AT ALL
SUM OF ALL RESPONSES TO PHQ9 QUESTIONS 1 & 2: 0

## 2024-11-18 NOTE — OP NOTE
"Left Elbow Total Arthroplasty (L) Operative Note     Date: 2024  OR Location: [facility] A OR    Name: Florina Doll \"Dionne\", : 1955, Age: 69 y.o., MRN: [id number], Sex: female    Diagnosis  Pre-op Diagnosis      * Elbow arthritis [M19.029] Post-op Diagnosis     * Elbow arthritis [M19.029]     Procedures  Left Elbow Total Arthroplasty  80581 - FL ARTHRP ELBOW W/DISTAL HUM&PROX UR PROSTC [facility]      Surgeons      * Michael Flores - Primary    Resident/Fellow/Other Assistant:  Surgeons and Role:     * Jessica Oleary MD - Resident - Assisting     * NIRMALA Wheeler-CNP - [facility] First Assist    Staff:   Circulator: Luci Roman Person: Brad Siddiqi Circulator: Carolina  Relief Scrub: Yahaira    Anesthesia Staff: Anesthesiologist: Trina Boggs MD  C-AA: ANDREINA Coffey; ANDREINA Jasmine    Procedure Summary  Anesthesia: General  ASA: II  Estimated Blood Loss: 75mL  Intra-op Medications:   Administrations occurring from 0730 to 0930 on 24:   Medication Name Total Dose   ceFAZolin (Ancef) vial 1 g 2 g   dexAMETHasone (Decadron) injection 4 mg/mL 8 mg   diphenhydrAMINE 50 mg/mL 25 mg   fentaNYL (Sublimaze) injection 50 mcg/mL 100 mcg   LR bolus Cannot be calculated   lidocaine PF (Xylocaine-MPF) local injection 2 % 40 mg   phenylephrine 100 mcg/mL syringe 10 mL (prefilled) 400 mcg   propofol (Diprivan) injection 10 mg/mL 200 mg   rocuronium (ZeMuron) 50 mg/5 mL injection 70 mg   tranexamic acid (Cyklokapron) injection 1,000 mg              Anesthesia Record               Intraprocedure I/O Totals          Intake    Tranexamic Acid 0.00 mL    The total shown is the total volume documented since Anesthesia Start was filed.    Total Intake 0 mL       Output    Est. Blood Loss 75 mL    Total Output 75 mL       Net    Net Volume -75 mL          Specimen: No specimens collected              Drains and/or Catheters: * None in log *    Tourniquet Times:     Total Tourniquet Time Documented:  Arm - Upper " "(Left) - 129 minutes  Total: Arm - Upper (Left) - 129 minutes      Implants:  Implants       Type Name Action Serial No.      Implant CEMENT, BONE, TOBRAMYCIN, FULL DOSE - SN/A - [id number] Implanted N/A     Screw PLUG, CEMENT, CLEARCUT, 8MM - SN/A - [id number] Implanted N/A     Joint SPOOL, SMALL CENTERED OFFSET LEFT - [id number] - [id number] Implanted [id number]     Joint STEM, ULNAR STANDARD SMALL LEFT - [id number] - [id number] Implanted [id number]     Joint HUMERAL STEM-COATED Implanted [id number]     Joint ULNAR CAP, SMALL - [id number] - [id number] Implanted [id number]              Findings: Consistent with diagnosis patient had end-stage arthritis of her left elbow.  Ulnar nerve was intact throughout its course significant adhesions around the elbow requiring release and in situ decompression    Indications: Florina Doll \"Dionne\" is an 69 y.o. 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 Ulnar neuropathy     The patient was seen in the preoperative area. The risks, benefits, complications, treatment options, non-operative alternatives, expected recovery and outcomes were discussed with the patient. The possibilities of reaction to medication, pulmonary aspiration, injury to surrounding structures, bleeding, recurrent infection, the need for additional procedures, failure to diagnose a condition, and creating a complication requiring transfusion or operation were discussed with the patient. The patient concurred with the proposed plan, giving informed consent.  The site of surgery was properly noted/marked if necessary per policy. The patient has been actively warmed in preoperative area. Preoperative antibiotics have been ordered and given within 1 hours of incision. Venous thrombosis prophylaxis have been ordered including bilateral sequential compression devices    Procedure Details:.  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  Failed conservative " management include injection therapy anti-inflammatories.  Risk-benefit alternatives of various operative techniques including arthroscopy arthrotomy until elbow replacement all discussed.  On her left elbow she previously had of the lateral elbow surgery.  This has not provided sustained relief.  She understood the risk of lifting restrictions infection bleeding nerve injury fracture dislocation and wished to proceed with the left total elbow replacement.  She had prior been scheduled for this but had had a small laceration on her elbow that he had to heal.  Op report on the day of surgery she is seen the preop holding area identifies a correct patient and her left elbow is identified marked correct operative site risk benefits alternatives were again discussed.  These include infection bleeding nerve injury no improvement in pain loss of motion.  Elbow was marked nerve block was performed taken the operating room.  We performed a huddle.  She was correct patient left elbow was correct operative site.  Sedated in bed by anesthesia.  Antibiotics were dosed appropriately.  Trans-Jones acid was given.  Sloppy lateral position.  Small bump placed underneath her scapula.  Prepped draped standard fashion.  She had 45 to 120 degrees of flexion extension of her elbow.  60 degrees supination pronation.  Sterile tourniquet was placed.  Marked her incision in standard fashion.  Used an Esmarch to use a tourniquet 225 mmHg.  There is standard posterior until tearing the incision.  Soft tissue flaps medially laterally.  At this point we then applied the ulnar nerve and to decrease compression in situ.  All the way through the flexor musculature.  Intra-articular branch was ligated.  This was moved in the subcutaneous tissue for the completion of the total elbow.  We turned attention to the triceps management of tongue was performed.  Tag stitch was placed at the proximal aspect of the apex.  We then proceeded to open up the  shoulder and release all the soft tissue off the humerus.  We extended this down to the ulna.  This was taken all with 1 flap for later repair.  We dislocated the elbow.  We prepared the humerus in standard fashion.  Oscillating saw was used to remove the trochlea.  We then used the Torni3Leaf Latitude instrumentation to prepare the humerus.  Osteophytes had been removed and ascites have been removed.  Small was felt to be the best fit for her.  We turned our attention to the ulna.  She had medialized this as well as distal lysed her coronoid.  She had medial coronoid.  We used a bur after using the standard instrumentation to remove the bone off the proximal ulna.  We use a bur to remove the tip of the olecranon.  We identified the canal fluoroscopic images ensured we were anterior intracortical.  This is done multiple times.  We really got a very nice press-fit.  She had a very narrow canal.  I made decision to do a press-fit ulna.  We cemented the humerus with methylene blue antibiotic cement.  We are out of within 5 degrees of full extension held it there to the cement hardened.  Removed excess cement placed there interlock polyethylene.  Copiously irrigated the wound.  Fluoroscopic images ensured we were in excellent alignment.  We then closed on her tongue with nonabsorbable suture we used 0 Vicryl underneath the groove of the where the ulnar nerve would be.  An in situ placement of the nerve was performed.  We placed a drain in the superficial space.  Bipolar and Bovie electrocautery obtained hemostasis throughout the course of the case.  Closed in standard fashion.  Placed a posterior splint in full extension.  Woke the patient from anesthesia transferred to the PACU where she was recovering.  Please note that we will be billing for my nurse practitioner's first assist as she was critical in the center for successful completion of the case including positioning of the body, retraction, suture management,  placement of the implants and wound closure. There was no qualified resident available for the case  Complications:  None; patient tolerated the procedure well.    Disposition: PACU - hemodynamically stable.  Condition: stable             Task Performed by [facility] First Assist or Physician Assistant:   Loco/NP, was necessary to assist on this case due to the nature of the case and difficulty. During the case she served as my assist by final traction patient positioning wound closure      Additional Details: She will get her dose of antibiotics.  Drain removed on postop day 1.  She will stay in extension splint for 7 to 10 days we will then begin range of motion exercises    Attending Attestation: I was present and scrubbed for the key portions of the procedure.    Michael Flores  Phone Number: [phone]

## 2024-11-18 NOTE — H&P
[facility] Department of Orthopaedic Surgery   Surgical History & Physical <30 Days    Reason for Surgery: L elbow end stage osteoarthritis  Planned Procedure: left total elbow arthroplasty    History & Physical Reviewed:  I have reviewed the History and Physical for obtained within the last 30 days. Relevant findings and updates are noted below:  No significant changes.  H&P reviewed from 10/25/2024  Home medications were reviewed with significant updates noted below:  No significant changes.    ERAS patient?: No    COVID-19 Risk Consent:   Surgeon has reviewed the key risks related to zaina COVID-19 and subsequent sequelae.     11/18/24 at 6:14 AM - Jessica Oleary MD

## 2024-11-18 NOTE — Clinical Note
Procedure:          Florina iqbal (438)  Anesthesia type (i.e. general, regional, MAC):      general  Nerve block (if applicable): none  Estimated blood loss (EBL) in OR: 75ml  Orientation/mental status: A+Ox4  IV site(s), and drips/fluids currently infusin right hand LR@100  PO status (last oral intake): taking sips of water  O2 requirements: on RA  Current pain level & last pain/nausea medication dose/time given: Pt. Has no pain resting comfortably  Next antibiotic due @ 1550  Last tranexamic acid dose given @ 1550  Last void/Amador in place: no amador due to void at 1730  Equipment sent with patient (i.e. Polar Cube, TENs unit, walker, crutches): none  She has a JORGE ALBERTO drain to the left arm, elbow has dermabond, Telfa, fluffs with splint.  SCDs on (yes/no): yes  Belongings sent with patient: yes  Emergency contact (name, relationship, phone number): Atilio () [phone]

## 2024-11-18 NOTE — ANESTHESIA POSTPROCEDURE EVALUATION
"Patient: Florina Doll \"Dionne\"    Procedure Summary       Date: 11/18/24 Room / Location: [facility] OR 17 / Virtual [facility] OR    Anesthesia Start: 0736 Anesthesia Stop: 1132    Procedure: Left Elbow Total Arthroplasty (Left: Elbow) Diagnosis:       Elbow arthritis      (Elbow arthritis [M19.029])    Surgeons: Michael Flores MD Responsible Provider: Trina Boggs MD    Anesthesia Type: general ASA Status: 2            Anesthesia Type: general    Vitals Value Taken Time   /67 11/18/24 1216   Temp 36.2 °C (97.2 °F) 11/18/24 1215   Pulse 82 11/18/24 1220   Resp 14 11/18/24 1220   SpO2 98 % 11/18/24 1220   Vitals shown include unfiled device data.    Anesthesia Post Evaluation    Patient location during evaluation: PACU  Patient participation: complete - patient participated  Level of consciousness: awake  Pain management: adequate  Multimodal analgesia pain management approach  Airway patency: patent  Cardiovascular status: hemodynamically stable  Respiratory status: spontaneous ventilation  Hydration status: euvolemic  Postoperative Nausea and Vomiting: none        No notable events documented.    "

## 2024-11-18 NOTE — ANESTHESIA PROCEDURE NOTES
Peripheral Block    Patient location during procedure: pre-op  Start time: 11/18/2024 7:21 AM  End time: 11/18/2024 7:31 AM  Reason for block: at surgeon's request and post-op pain management  Staffing  Performed: attending   Authorized by: Trina Boggs MD    Performed by: Trina Boggs MD  Preanesthetic Checklist  Completed: patient identified, IV checked, site marked, risks and benefits discussed, surgical consent, monitors and equipment checked, pre-op evaluation and timeout performed   Timeout performed at: 11/18/2024 7:20 AM  Peripheral Block  Patient position: sitting  Prep: ChloraPrep and site prepped and draped  Patient monitoring: heart rate, cardiac monitor and continuous pulse ox  Block type: brachial plexus and supraclavicular  Laterality: left  Injection technique: single-shot  Guidance: nerve stimulator, ultrasound guided and ultrasound image saved to chart.  Local infiltration: lidocaine  Infiltration strength: 1 %  Dose: 2 mL  Needle  Needle type: short-bevel   Needle gauge: 22 G  Needle length: 5 cm  Needle localization: anatomical landmarks, nerve stimulator and ultrasound guidance  Test dose: negative  Assessment  Injection assessment: negative aspiration for heme, no paresthesia on injection, incremental injection and local visualized surrounding nerve on ultrasound  Paresthesia pain: none  Heart rate change: no  Slow fractionated injection: no  Additional Notes  Prior to Procedure:  Focussed neurological history elicited. Patient related and procedure specific risks discussed including, but not limited to: bleeding, infection, nerve injury, injury to surrounding structures, prolonged numbness or weakness, local anesthetic toxicity and increased pain after block resolution.Verbal consent obtained from patient and/or surrogate decision maker. Anticoagulation (if any) was held per PETER guidelines.    Pre-medication with Versed 3 mg intravenously.  No Evoked Motor Response was visible at < 0.3  mA. Using hydrodissection with a blunt-tip, echogenic needle, the block was performed under dynamic ultrasound guidance. With in-plane needle visualization, 20 ml of 0.5% Bupivacaine with epi 5 mcg/ml, precedex 20 mcg and decadron 4 mg was injected extraneurally in 5 ml increments. Care was taken to avoid intraneural injection or expansion of intraneural tissue. There was no resistance to injection and appropriate injection pressures were maintained based on tactile feedback. Throughout the procedure, there was consistent and meaningful verbal communication with the patient. Post procedure vital signs were stable and no immediate complications were noted. PACU will provide written instructions that outline the specific precautions to be taken when caring for an akinetic,insensate extremity.

## 2024-11-18 NOTE — PROGRESS NOTES
11/18/24 1414   Discharge Planning   Living Arrangements Spouse/significant other   Support Systems Spouse/significant other   Assistance Needed none   Type of Residence Private residence   Number of Stairs to Enter Residence 6   Number of Stairs Within Residence 1   Do you have animals or pets at home? No   Who is requesting discharge planning? Provider   Home or Post Acute Services None   Expected Discharge Disposition Home   Financial Resource Strain   How hard is it for you to pay for the very basics like food, housing, medical care, and heating? Not hard   Housing Stability   In the last 12 months, was there a time when you were not able to pay the mortgage or rent on time? N   In the past 12 months, how many times have you moved where you were living? 0   At any time in the past 12 months, were you homeless or living in a shelter (including now)? N   Transportation Needs   In the past 12 months, has lack of transportation kept you from medical appointments or from getting medications? no   In the past 12 months, has lack of transportation kept you from meetings, work, or from getting things needed for daily living? No     11/18/24 1415  Met with patient at bedside to discuss discharge planning.  Patient lives at home with her  in a house.  She is independent with ADLs and drives at baseline.  She does not use any assistive devices for ambulation.  She plans on going home at discharge.  She denies any HHC, DME, or other discharge needs.  She will notify the [facility] should any needs arise.  ADOD tomorrow.  Mercedes Cuevas RN [facility]

## 2024-11-18 NOTE — ANESTHESIA PROCEDURE NOTES
Airway  Date/Time: 11/18/2024 7:49 AM  Urgency: elective      Staffing  Performed: ISAAC   Authorized by: Trina Boggs MD    Performed by: ANDREINA Jasmine  Patient location during procedure: OR    Indications and Patient Condition  Indications for airway management: anesthesia  Spontaneous Ventilation: absent  Sedation level: deep  Preoxygenated: yes  Patient position: sniffing  MILS maintained throughout  Mask difficulty assessment: 1 - vent by mask  Planned trial extubation    Final Airway Details  Final airway type: endotracheal airway      Successful airway: ETT  Cuffed: yes   Successful intubation technique: video laryngoscopy  Endotracheal tube insertion site: oral  Blade: Alka  Blade size: #3  ETT size (mm): 7.0  Cormack-Lehane Classification: grade IIa - partial view of glottis  Placement verified by: capnometry   Measured from: lips  ETT to lips (cm): 21  Number of attempts at approach: 1

## 2024-11-19 ENCOUNTER — PHARMACY VISIT (OUTPATIENT)
Dept: PHARMACY | Facility: CLINIC | Age: 69
End: 2024-11-19
Payer: COMMERCIAL

## 2024-11-19 VITALS
DIASTOLIC BLOOD PRESSURE: 76 MMHG | BODY MASS INDEX: 34.63 KG/M2 | OXYGEN SATURATION: 95 % | WEIGHT: 176.37 LBS | TEMPERATURE: 97.7 F | RESPIRATION RATE: 18 BRPM | HEIGHT: 60 IN | HEART RATE: 81 BPM | SYSTOLIC BLOOD PRESSURE: 137 MMHG

## 2024-11-19 LAB
ANION GAP SERPL CALC-SCNC: 13 MMOL/L (ref 10–20)
BUN SERPL-MCNC: 19 MG/DL (ref 6–23)
CALCIUM SERPL-MCNC: 9.5 MG/DL (ref 8.6–10.3)
CHLORIDE SERPL-SCNC: 103 MMOL/L (ref 98–107)
CO2 SERPL-SCNC: 25 MMOL/L (ref 21–32)
CREAT SERPL-MCNC: 0.72 MG/DL (ref 0.5–1.05)
EGFRCR SERPLBLD CKD-EPI 2021: >90 ML/MIN/1.73M*2
ERYTHROCYTE [DISTWIDTH] IN BLOOD BY AUTOMATED COUNT: 13.6 % (ref 11.5–14.5)
GLUCOSE SERPL-MCNC: 151 MG/DL (ref 74–99)
HCT VFR BLD AUTO: 42.3 % (ref 36–46)
HGB BLD-MCNC: 14 G/DL (ref 12–16)
MCH RBC QN AUTO: 29.9 PG (ref 26–34)
MCHC RBC AUTO-ENTMCNC: 33.1 G/DL (ref 32–36)
MCV RBC AUTO: 90 FL (ref 80–100)
NRBC BLD-RTO: 0 /100 WBCS (ref 0–0)
PLATELET # BLD AUTO: 191 X10*3/UL (ref 150–450)
POTASSIUM SERPL-SCNC: 4.2 MMOL/L (ref 3.5–5.3)
RBC # BLD AUTO: 4.68 X10*6/UL (ref 4–5.2)
SODIUM SERPL-SCNC: 137 MMOL/L (ref 136–145)
WBC # BLD AUTO: 13.9 X10*3/UL (ref 4.4–11.3)

## 2024-11-19 PROCEDURE — 7100000011 HC EXTENDED STAY RECOVERY HOURLY - NURSING UNIT

## 2024-11-19 PROCEDURE — 2500000004 HC RX 250 GENERAL PHARMACY W/ HCPCS (ALT 636 FOR OP/ED)

## 2024-11-19 PROCEDURE — 85027 COMPLETE CBC AUTOMATED: CPT

## 2024-11-19 PROCEDURE — 9420000001 HC RT PATIENT EDUCATION 5 MIN

## 2024-11-19 PROCEDURE — 82374 ASSAY BLOOD CARBON DIOXIDE: CPT

## 2024-11-19 PROCEDURE — 97165 OT EVAL LOW COMPLEX 30 MIN: CPT | Mod: GO

## 2024-11-19 PROCEDURE — 97535 SELF CARE MNGMENT TRAINING: CPT | Mod: GO

## 2024-11-19 PROCEDURE — 2500000004 HC RX 250 GENERAL PHARMACY W/ HCPCS (ALT 636 FOR OP/ED): Performed by: NURSE PRACTITIONER

## 2024-11-19 PROCEDURE — 2500000001 HC RX 250 WO HCPCS SELF ADMINISTERED DRUGS (ALT 637 FOR MEDICARE OP): Performed by: NURSE PRACTITIONER

## 2024-11-19 PROCEDURE — 36415 COLL VENOUS BLD VENIPUNCTURE: CPT

## 2024-11-19 RX ORDER — HYDRALAZINE HYDROCHLORIDE 20 MG/ML
5 INJECTION INTRAMUSCULAR; INTRAVENOUS ONCE
Status: COMPLETED | OUTPATIENT
Start: 2024-11-19 | End: 2024-11-19

## 2024-11-19 ASSESSMENT — PAIN - FUNCTIONAL ASSESSMENT
PAIN_FUNCTIONAL_ASSESSMENT: 0-10

## 2024-11-19 ASSESSMENT — COGNITIVE AND FUNCTIONAL STATUS - GENERAL
DAILY ACTIVITIY SCORE: 21
DRESSING REGULAR UPPER BODY CLOTHING: A LITTLE
DRESSING REGULAR LOWER BODY CLOTHING: A LITTLE
HELP NEEDED FOR BATHING: A LITTLE

## 2024-11-19 ASSESSMENT — ACTIVITIES OF DAILY LIVING (ADL)
ADL_ASSISTANCE: INDEPENDENT
HOME_MANAGEMENT_TIME_ENTRY: 20

## 2024-11-19 ASSESSMENT — PAIN SCALES - GENERAL
PAINLEVEL_OUTOF10: 3
PAINLEVEL_OUTOF10: 0 - NO PAIN
PAINLEVEL_OUTOF10: 4
PAINLEVEL_OUTOF10: 2
PAINLEVEL_OUTOF10: 1

## 2024-11-19 ASSESSMENT — PAIN DESCRIPTION - ORIENTATION: ORIENTATION: LEFT

## 2024-11-19 ASSESSMENT — PAIN DESCRIPTION - LOCATION: LOCATION: ELBOW

## 2024-11-19 NOTE — PROGRESS NOTES
"Florina Doll \"Dionne\" is a 69 y.o. female on day 0 of admission presenting with Elbow arthritis.    Orthopaedic Surgery Progress Note    S:  No acute overnight events. Block wore off overnight. Pain controlled on current regimen.  Denies any new onset numbness, tingling or weakness. Denies nausea, vomiting, chest pain, dyspnea, or calf tenderness. Denies any fever or chills. Tolerating diet and mobilizing independently.         O:  /85   Pulse 61   Temp 37.1 °C (98.8 °F) (Temporal)   Resp 18   Ht 1.524 m (5')   Wt 80 kg (176 lb 5.9 oz)   SpO2 95%   BMI 34.44 kg/m²     Constitutional: NAD  HEENT: hearing and vision grossly intact, MMM  Resp: breathing comfortably   CV: extremities warm, well perfused  Neuro: alert, sensory and motor grossly intact  Psych: Appropriate mood and affect      MSK:  LUE  - Splint in place. C/d/I  - JORGE ALBERTO drain in place, holding suction with about 20 ccs serosanguinous fluid  -Motor intact in /AIN/PIN/ulnar distributions  -SILT axillary/radial/median/ulnar distributions  -Hand warm, well perfused  -Palpable radial pulse, cap refill brisk  -Compartments soft and compressible        A/P: 69 y.o. female w L elbow inflammatory arthritis s/p L total elbow arthroplasty on 11/18 with Dr. Flores.  Doing well.     Plan:  - Weight bearing: NWB LUE in splint in full extension  - DVT ppx: SCDs, ASA 81 mg per day  - Diet: Regular   - Pain: Multimodal  - Antibiotics: perioperative ancef 2g q8hr x3 doses  - FEN: HLIV with good PO intake  - Bowel Regimen: aggressive bowel regimen  - PT/OT  - Pulm: Encourage IS  - Continue home medications  - No amador  - Drain removed     Dispo: home today    Jessica Oleary MD PGY4  Orthopedic Resident  [facility]  Available by Epic Message        "

## 2024-11-19 NOTE — PROGRESS NOTES
"Florina Doll \"Dionne\" is a 69 y.o. female on day 0 of admission presenting with Elbow arthritis.    Orthopaedic Surgery Progress Note    S:  Seen and evaluated postoperatively on floor. Reports block still in place. Denies headache, fever, SOB, CP, N/V. Tolerating diet. Did discuss patient concern for current abx regimen. Patient reports she is not allergic to cefazolin and agrees with plan to receive ancef for postop surgical prophylaxis.      O:  /84 (BP Location: Right arm, Patient Position: Lying)   Pulse 60   Temp 36.9 °C (98.5 °F) (Temporal)   Resp 17   Ht 1.524 m (5')   Wt 80 kg (176 lb 5.9 oz)   SpO2 94%   BMI 34.44 kg/m²     Constitutional: NAD  HEENT: hearing and vision grossly intact, MMM  Resp: breathing comfortably   CV: extremities warm, well perfused  Neuro: alert, sensory and motor grossly intact  Psych: Appropriate mood and affect      MSK:  LUE  - Splint in place, c/d/I  - Motor and sensory limited 2/2 block  -Hand warm, well perfused  -Palpable radial pulse, cap refill brisk  -Compartments soft and compressible  - JORGE ALBERTO drain in place, holding suction      A/P: 69 y.o. female w L elbow inflammatory arthritis s/p L total elbow arthroplasty on 11/18 with Dr. Flores.  Doing well.     Plan:  - Weight bearing: NWB LUE in splint in full extension  - DVT ppx: SCDs, ASA 81 mg per day  - Diet: Regular   - Pain: Multimodal  - Antibiotics: perioperative ancef 2g q8hr x3 doses  - FEN: HLIV with good PO intake  - Bowel Regimen: aggressive bowel regimen  - PT/OT  - Pulm: Encourage IS  - Continue home medications  - No amador  - Drain to be removed POD1    Dispo: pending postop course, likely home POD1    Jessica Oleary MD PGY4  Orthopedic Resident  [facility]  Available by Epic Message        "

## 2024-11-19 NOTE — NURSING NOTE
Interdisciplinary team present: NP, PT, NM, CC, SW, Orthopedic Coordinator, and bedside RN.  Pain - controlled  Nausea - none  Discharge barrier - none  Discharge plan - home  Discharge date/time -  later today

## 2024-11-19 NOTE — CARE PLAN
The patient's goals for the shift include to remain comfortable    The clinical goals for the shift include pain control    Over the shift, the patient did not make progress toward the following goals. Barriers to progression include post surgical. Recommendations to address these barriers include pain management.

## 2024-11-19 NOTE — PROGRESS NOTES
"Occupational Therapy    Evaluation/Treatment    Patient Name: Florina Doll \"Dionne\"  MRN: [id number]  Department: [facility]   Room: [address] [place] [address]  Today's Date: 11/19/24  Time Calculation  Start Time: 0928  Stop Time: 0958  Time Calculation (min): 30 min       Assessment:  OT Assessment: Impaired ADLs  Prognosis: Good  Barriers to Discharge: None  Evaluation/Treatment Tolerance: Patient tolerated treatment well  Medical Staff Made Aware: Yes  End of Session Communication: Bedside nurse, Care Coordinator  End of Session Patient Position: Bed, 3 rail up, Alarm off, not on at start of session  OT Assessment Results: Decreased ADL status  Prognosis: Good  Barriers to Discharge: None  Evaluation/Treatment Tolerance: Patient tolerated treatment well  Medical Staff Made Aware: Yes  Strengths: Ability to acquire knowledge, Access to adaptive/assistive products, Attitude of self, Housing layout, Insight into problems, Premorbid level of function, Support of Caregivers  Plan:  Treatment Interventions: ADL retraining  No Skilled OT: Safe to return home  OT Frequency: OT eval only  OT Discharge Recommendations: No further acute OT  OT Recommended Transfer Status: Stand by assist  OT - OK to Discharge: Yes  Treatment Interventions: ADL retraining    Subjective   Current Problem:  1. Elbow arthritis  FL less than 1 hour    FL less than 1 hour        General:   OT Received On: 11/19/24  General  Reason for Referral: 69 y.o. F admitted for elbow arthritis s/p Left Elbow Total Arthroplasty  ARTHRP ELBOW W/DISTAL HUM&PROX UR PROSTC RPLCM  Referred By: Irma Claros, APRN-CNP  Past Medical History Relevant to Rehab:   Past Medical History:   Diagnosis Date    Arthritis     Cleft lip and cleft palate (HHS-HCC)     Diverticulosis     DVT (deep venous thrombosis) (Multi)     Right poipliteal, after TKA    Hearing aid worn     Right ear only    Hypertension     Hypothyroidism     PONV (postoperative nausea and vomiting)     RLS (restless legs " syndrome)      Past Surgical History:   Procedure Laterality Date    APPENDECTOMY  1990    CHOLECYSTECTOMY  1999    COLON SURGERY  1990    resection for diverticulitis    DILATION AND CURETTAGE OF UTERUS      1980, 1986 lysis of adhesions    ELBOW FUSION Left 1978    FOOT FUSION Right 08/2023    navicular bone replacement    FOOT FUSION Right 12/2023    REVISION    FOOT SURGERY Bilateral     spherical implants    HARDWARE REMOVAL FOOT / ANKLE Left 1995    LASIK Bilateral 1999    MOUTH SURGERY      multiple surgeries on cleft lip and palate    ORIF ANKLE FRACTURE Left 1994    TOTAL KNEE ARTHROPLASTY Right 2019    TOTAL KNEE ARTHROPLASTY Left     WRIST SURGERY Right 2005       Family/Caregiver Present: No  Prior to Session Communication: Bedside nurse  Patient Position Received: Bed, 3 rail up, Alarm off, not on at start of session  Preferred Learning Style: verbal, visual  General Comment: Pt. received supine in bed, L UE elevated on pillow, L UE splint in extension glasses donned, Hep locked  Precautions:  Hearing/Visual Limitations: R hearing aid which is not here at the hospital. GLasses for reading  UE Weight Bearing Status: Left Non-Weight Bearing  Medical Precautions: Fall precautions  Post-Surgical Precautions:  (L UE elbow)  Splinting: L UE splint in extension  Precautions Comment: NWB L UE    Pain:  Pain Assessment  Pain Assessment: 0-10  0-10 (Numeric) Pain Score: 3  Pain Type: Surgical pain  Pain Location: Elbow  Pain Orientation: Left    Objective   Cognition:  Overall Cognitive Status: Within Functional Limits  Orientation Level: Oriented X4  Safety/Judgement: Within Functional Limits  Insight: Within function limits  Impulsive: Within functional limits    Home Living:  Type of Home: House  Lives With: Spouse  Home Adaptive Equipment: Walker rolling or standard, Cane (rollator; knee scooter, adjustable bed)  Home Layout: Two level, Full bath main level  Home Access: Stairs to enter without  rails  Entrance Stairs-Rails: None  Entrance Stairs-Number of Steps: 6 (slab steps)  Bathroom Shower/Tub: Tub/shower unit  Bathroom Toilet: Standard  Bathroom Equipment: Grab bars in shower, Shower chair without back, Hand-held shower hose  Prior Function:  Level of [place]: Independent with ADLs and functional transfers, Independent with homemaking with ambulation  Receives Help From: Family, Friends  ADL Assistance: Independent  Hand Dominance: Right  IADL History:  Homemaking Responsibilities: Yes  Current License: Yes  Mode of Transportation: Car  Occupation: Retired  Type of Occupation: RN  ADL:  Equipment: Reacher  Grooming Assistance: Stand by  Grooming Deficit: Setup, Wash/dry hands  UE Dressing Assistance: Minimal  UE Dressing Deficit: Thread LUE  LE Dressing Assistance: Minimal  LE Dressing Deficit: Thread RLE into pants, Thread LLE into pants  Toileting Assistance with Device: Stand by  Toileting Deficit: Supervison/safety  Activities of Daily Living: Grooming  Grooming Level of Assistance: Close supervision, Setup  Grooming Where Assessed: Standing sinkside  Grooming Comments: Pt. completed handwashing of R hand with set up with paper towels standing sink side without difficulty.    UE Dressing  UE Dressing Level of Assistance: Minimum assistance  UE Dressing Where Assessed:  (standing at EOB)  UE Dressing Comments: Pt. required Min A to thread L UE through pull over  bra and shirt while standing EOB    LE Dressing  LE Dressing: Yes  Pants Level of Assistance: Minimum assistance  Adult Briefs Level of Assistance: Close supervision    Toileting  Toileting Level of Assistance: Close supervision  Where Assessed: Toilet  Toileting Comments:  (Pt. completed toileting task with close sup for safety while seated on toilet)  Activity Tolerance:  Endurance: Endurance does not limit participation in activity  Early Mobility/Exercise Safety Screen: Proceed with mobilization - No exclusion criteria met    Bed  Mobility/Transfers: Bed Mobility  Bed Mobility: Yes  Bed Mobility 1  Bed Mobility 1: Supine to sitting, Sitting to supine  Level of Assistance 1: Close supervision  Bed Mobility Comments 1: Use of bed rail    Transfers  Transfer: Yes    Toilet Transfers  Toilet Transfer to: Standard toilet  Toilet Transfers: Supervision  Toilet Transfers Comments: Pt. completed toielt trasnfer with close sup for safety.    Therapy/Activity: Therapeutic Activity  Therapeutic Activity Performed: Yes  Therapeutic Activity 1: Pt. edu on importance of NWBing status, L UE elevation and finger and wrist ROM.    Vision:Vision - Basic Assessment  Current Vision: Wears glasses only for reading  Sensation:     Strength:     Other Activity:     Home Environment:     Perception:     Coordination:      Hand Function:  Hand Function  Gross Grasp: Functional  Coordination: Functional  Extremities: RUE   RUE : Within Functional Limits and LUE   LUE: Exceptions to WFL    Outcome Measures: [facility] Daily Activity  Putting on and taking off regular lower body clothing: A little  Bathing (including washing, rinsing, drying): A little  Putting on and taking off regular upper body clothing: A little  Toileting, which includes using toilet, bedpan or urinal: None  Taking care of personal grooming such as brushing teeth: None  Eating Meals: None  Daily Activity - Total Score: 21      Education Documentation  Body Mechanics, taught by Park Blanco OT at 11/19/2024 11:28 AM.  Learner: Patient  Readiness: Acceptance  Method: Explanation, Demonstration  Response: Verbalizes Understanding, Demonstrated Understanding    Precautions, taught by Park Blanco OT at 11/19/2024 11:28 AM.  Learner: Patient  Readiness: Acceptance  Method: Explanation, Demonstration  Response: Verbalizes Understanding, Demonstrated Understanding    ADL Training, taught by Park Blanco OT at 11/19/2024 11:28 AM.  Learner: Patient  Readiness: Acceptance  Method: Explanation,  Demonstration  Response: Verbalizes Understanding, Demonstrated Understanding    Education Comments  No comments found.        OP EDUCATION:  Education  Individual(s) Educated: Patient  Education Provided: Diagnosis & Precautions, Fall precautons, Risk and benefits of OT discussed with patient or other, POC discussed and agreed upon  Risk and Benefits Discussed with Patient/Caregiver/Other: yes  Patient/Caregiver Demonstrated Understanding: yes  Plan of Care Discussed and Agreed Upon: yes  Patient Response to Education: Patient/Caregiver Verbalized Understanding of Information, Patient/Caregiver Performed Return Demonstration of Exercises/Activities, Patient/Caregiver Asked Appropriate Questions

## 2024-11-20 NOTE — DISCHARGE SUMMARY
Discharge Diagnosis  Elbow arthritis    Issues Requiring Follow-Up  Left elbow end stage inflammatory arthritis    Test Results Pending At Discharge  Pending Labs       No current pending labs.            Hospital Course   69 year-old male who presented with left elbow end stage inflammatory arthritis. Patient is now s/p L total elbow arthroplasty on 11/18/2024 with Dr. Flores. On the day of surgery, patient was identified in the pre-operative holding area and agreeable to proceed with surgery. Written consent was obtained.  Please see operative note for further details of this procedure. Patient received 24 hours of oliver-operative antibiotics. Patient recovered in the PACU before transfer to a regular nursing floor. Patient was started on oxycodone and tylenol for pain control and  ASA 81 mg  for DVT prophylaxis. Physical therapy recommended continued recovery at home with continued physical therapy and wound care. On the day of discharge, patient was afebrile with stable vital signs. Patient was neurovascularly intact at time of discharge. Patient was discharged with prescription of aspirin for DVT prophylaxis. Patient will follow-up with Dr. Flores in 1 weeks for post-operative visit.      Pertinent Physical Exam At Time of Discharge  Constitutional: NAD  HEENT: hearing and vision grossly intact, MMM  Resp: breathing comfortably   CV: extremities warm, well perfused  Neuro: alert, sensory and motor grossly intact  Psych: Appropriate mood and affect        MSK:  LUE  - Splint in place. C/d/I  - JORGE ALBERTO drain in place, holding suction with about 20 ccs serosanguinous fluid  -Motor intact in /AIN/PIN/ulnar distributions  -SILT axillary/radial/median/ulnar distributions  -Hand warm, well perfused  -Palpable radial pulse, cap refill brisk  -Compartments soft and compressible    Home Medications     Medication List      START taking these medications     aspirin 81 mg EC tablet; Take 1 tablet (81 mg) by mouth once  daily for   14 days.   docusate sodium 100 mg capsule; Commonly known as: Colace; Take 1   capsule (100 mg) by mouth 2 times a day for 15 days.   oxyCODONE-acetaminophen 5-325 mg tablet; Commonly known as: Percocet;   Take 1 tablet by mouth every 4 hours if needed for severe pain (7 - 10)   for up to 6 days.     CONTINUE taking these medications     acetaminophen 500 mg tablet; Commonly known as: Tylenol   amLODIPine 5 mg tablet; Commonly known as: Norvasc   b complex vitamins capsule   BERBERINE CHLORIDE ORAL   calcium carbonate 200 mg calcium chewable tablet; Commonly known as:   Tums   calcium carbonate-vitamin D3 600 mg-5 mcg (200 unit) tablet   cetirizine 10 mg tablet; Commonly known as: ZyrTEC   garlic 1,000 mg capsule   ibuprofen 800 mg tablet   irbesartan-hydrochlorothiazide 300-12.5 mg tablet; Commonly known as:   Avalide   krill oil 500 mg capsule   levothyroxine 125 mcg tablet; Commonly known as: Synthroid, Levoxyl   multivitamin tablet   NON FORMULARY   red yeast rice 600 mg capsule   rOPINIRole 0.5 mg tablet; Commonly known as: Requip       Outpatient Follow-Up  Future Appointments   Date Time Provider Department Center   11/25/2024  2:00 PM Michael Flores MD [id number] [facility]       Jessica Oleary MD

## 2024-11-21 NOTE — PROGRESS NOTES
Subjective   Patient ID: Florina Doll is a 69 y.o. female    Chief Complaint: No chief complaint on file.       Last Surgery: Left Elbow Total Arthroplasty - Left  Date of Last Surgery: 11/18/2024    HPI  Florina Doll is a 69 y.o. right hand dominant female presenting for bilateral elbow arthritis right > left.     She was last seen by Irma at the beginning of the month. She was provided a prescription for occupational therapy. She does have numbness and tingling. Injections have somewhat helped.     She does have inflammatory arthritis in other joints. She has had bilateral knee replacements.     11/25/24  Florina Doll is a 69 y.o. female returning to the clinic for a follow up visit regarding her left elbow. She is s/p left elbow total arthroplasty done 11/18/24.    She is feeling well.     Objective   Patient is a well-developed, well nourished female in no acute distress. Awake, alert and oriented x3, with appropriate mood. Breathes with normal chest rises. Eye exam reveals round pupils with clear sclera. Gait is steady.      Patient is a well-developed, well-nourished female in no acute distress.  Breathes with normal chest rises.  Pupils are round and symmetric today.  Awake, alert, and oriented x3.     Examination of the left elbow today reveals the skin to be intact, incision is well healed. 5 out of 5 wrist flexion, wrist extension, and thumb extension bilaterally. Sensation is intact to light touch to median, ulnar, and radial nerves bilaterally. Stable to varus and valgus stress bilaterally.  Range of motion of the left elbow revealed 5-90° of flexion and extension and 85 degrees of supination and pronation. Patient had no tenderness to palpation at their medial or lateral epicondyle.            Imaging:  X-rays of the left elbow taken today personally ordered and interpreted by me show excellent alignment of reverse elbow replacement     Assessment/Plan   History of arthroplasty of left  elbow  Patient 1 week s/p left total elbow arthroplasty.    She is doing well today. I want her to continue to work on passive range of motion and she may bend her arm as much as she wants to 90°. We will see her back in 1 week for a wound check and removal of sutures. She understands her active and passive movement restrictions. She may passively extend her elbow, but I do not want her actively extending to protect the triceps    She was provided a hinged elbow brace today.   Orders Placed This Encounter    XR elbow left 3+ views     Follow up in 1 week    Scribe Attestation  By signing my name below, I, Shikha Traore   attest that this documentation has been prepared under the direction and in the presence of Michael Flroes MD.

## 2024-11-25 ENCOUNTER — APPOINTMENT (OUTPATIENT)
Dept: ORTHOPEDIC SURGERY | Facility: CLINIC | Age: 69
End: 2024-11-25
Payer: MEDICARE

## 2024-11-25 ENCOUNTER — HOSPITAL ENCOUNTER (OUTPATIENT)
Dept: RADIOLOGY | Facility: CLINIC | Age: 69
Discharge: HOME | End: 2024-11-25
Payer: MEDICARE

## 2024-11-25 DIAGNOSIS — Z96.622: Primary | ICD-10-CM

## 2024-11-25 DIAGNOSIS — Z96.622: ICD-10-CM

## 2024-11-25 PROCEDURE — 73080 X-RAY EXAM OF ELBOW: CPT | Mod: LT

## 2024-11-25 PROCEDURE — 99024 POSTOP FOLLOW-UP VISIT: CPT | Performed by: ORTHOPAEDIC SURGERY

## 2024-11-25 PROCEDURE — 73080 X-RAY EXAM OF ELBOW: CPT | Mod: LEFT SIDE | Performed by: RADIOLOGY

## 2024-12-03 ENCOUNTER — OFFICE VISIT (OUTPATIENT)
Dept: ORTHOPEDIC SURGERY | Facility: HOSPITAL | Age: 69
End: 2024-12-03
Payer: MEDICARE

## 2024-12-03 VITALS — WEIGHT: 176 LBS | HEIGHT: 60 IN | BODY MASS INDEX: 34.55 KG/M2

## 2024-12-03 DIAGNOSIS — M19.022 ARTHRITIS OF ELBOW, LEFT: ICD-10-CM

## 2024-12-03 DIAGNOSIS — Z96.622: Primary | ICD-10-CM

## 2024-12-03 PROCEDURE — 99211 OFF/OP EST MAY X REQ PHY/QHP: CPT | Performed by: NURSE PRACTITIONER

## 2024-12-03 PROCEDURE — 3008F BODY MASS INDEX DOCD: CPT | Performed by: NURSE PRACTITIONER

## 2024-12-03 PROCEDURE — 1160F RVW MEDS BY RX/DR IN RCRD: CPT | Performed by: NURSE PRACTITIONER

## 2024-12-03 PROCEDURE — 1125F AMNT PAIN NOTED PAIN PRSNT: CPT | Performed by: NURSE PRACTITIONER

## 2024-12-03 PROCEDURE — 1036F TOBACCO NON-USER: CPT | Performed by: NURSE PRACTITIONER

## 2024-12-03 PROCEDURE — 1159F MED LIST DOCD IN RCRD: CPT | Performed by: NURSE PRACTITIONER

## 2024-12-03 ASSESSMENT — PAIN - FUNCTIONAL ASSESSMENT: PAIN_FUNCTIONAL_ASSESSMENT: 0-10

## 2024-12-03 ASSESSMENT — PAIN SCALES - GENERAL: PAINLEVEL_OUTOF10: 1

## 2024-12-03 NOTE — PROGRESS NOTES
Provider Impression/Assessment:  Florina Doll is a pleasant 69 y.o. female who is returning to clinic for their repeat postoperative visit. They are 2 weeks S/P left elbow total arthroplasty, done 11/18/24.     Patient Discussion/Plan:  Florina Doll is doing very well since surgery. Her incision is healing well. Will start with therapy this week for range of motion only. Remain with no lifting over a coffee cup for the next 5 weeks. Talked about 3-4 month total recovery, up through the first 6 months. For the next month she should stay in the elbow brace, locked at 90 flexion and 0 of extension. This brace should be worn at all times. She can come out of the brace to shower and do her daily exercises. Otherwise it should be worn at all times and when sleeping. May extend the elbow as tolerated actively and passively now. No passive movement with flexion passed 90 degrees at this time. Continue with active and passive range of motion of the wrist and hand as tolerated. No lifting over a coffee cup for the next 5 weeks.     Prescription for occupational therapy for active and passive range of motion limitations explained. Theray should start this week. Will discuss strengthening when she is seen again in 4 weeks as that time as she will be approximately 2 months out from surgery.    We have scheduled to have Florina Doll see us back in 4 weeks for a repeat clinical check and repeat LEFT ELBOW XR at that time. We will likely progress to more aggressive range of motion and lite strengthening up to 5 pounds at 2 months from surgery.     She will remain limited to no strengthening or weight-bearing for the next 6 weeks. The goal is to return to regular activities at 3-4 months, but limited to no lifting over 10 pounds indefinitely. Can take up to 6 months for a full recovery.     We will review timing of her right elbow replacement surgery at her next follow up visit. She is aware that she would need a 3 month  minimum window between the two replacements to optimize healing.      All questions were answered today to their satisfaction and they know how to reach our office if needed prior to the next scheduled visit with our office. Patient was discussed with Dr Flores and he agrees with the above plan.    Should you have any questions or concerns after your visit today, please do not hesitate to call the office at [phone]. I am honored to be a provider on your health care team and remain dedicated to helping you achieve your healthcare goals    -------------------------------------------------------------------------------------------------  CHIEF COMPLAINT:  POV LEFT ELBOW REPLACEMENT   DOS: 11/18/24    History of Present Illness:  11/25/24  Florina Doll is a 69 y.o. female returning to the clinic for a follow up visit regarding her left elbow. She is s/p left elbow total arthroplasty done 11/18/24. She is feeling well.     12/3/24  Florina Doll returns to clinic for their left elbow. They are 2 weeks S/P left elbow total arthroplasty, done 11/18/24. Suture tails trimmed today. She has been wearing her elbow flexible brace and ace wrap. Dionne denies any new injury to the elbow since her last visit. No concerns. She reports that her left elbow already has better pain relief than her right elbow, which has known arthritis as well. Pain is well managed. Denies use of narcotic pain medication at this time. Sleeping without difficulty. Denies redness or warmth at incision site. Denies any fever, chills, or paresthesia. They have been compliant with restrictions.     Review of Systems:   Review of systems for all 14 systems is negative for complaint except for the above noted findings in the history of present illness.    Family, social, and medical histories are obtained and reviewed.    Allergies:  Allergies   Allergen Reactions    Clarithromycin Swelling    Sulfa (Sulfonamide Antibiotics) Itching and Rash     Topiramate Rash, Other and Swelling     Other reaction(s): Other: See Comments   Hands and feet go numb    Hands and feet go numb    Cefazolin Rash    Iodinated Contrast Media Itching and Rash     ITCHED ALL OVER    Morphine Hives, Itching and Rash       Medications:    Current Outpatient Medications:     acetaminophen (Tylenol) 500 mg tablet, Take 2 tablets (1,000 mg) by mouth every 8 hours if needed for mild pain (1 - 3)., Disp: , Rfl:     amLODIPine (Norvasc) 5 mg tablet, Take 1 tablet (5 mg) by mouth once daily., Disp: , Rfl:     b complex vitamins capsule, Take 1 capsule by mouth once daily., Disp: , Rfl:     BERBERINE CHLORIDE ORAL, Take 1,500 mg by mouth once daily., Disp: , Rfl:     calcium carbonate (Tums) 200 mg calcium chewable tablet, Chew 1 tablet (500 mg) 4 times a day as needed for indigestion or heartburn., Disp: , Rfl:     calcium carbonate-vitamin D3 600 mg-5 mcg (200 unit) tablet, Take 2 tablets by mouth once daily., Disp: , Rfl:     cetirizine (ZyrTEC) 10 mg tablet, Take 1 tablet (10 mg) by mouth once daily in the morning. Take before meals., Disp: , Rfl:     garlic 1,000 mg capsule, Take 1 capsule by mouth once daily., Disp: , Rfl:     ibuprofen 800 mg tablet, Take 1 tablet (800 mg) by mouth 3 times a day., Disp: , Rfl:     irbesartan-hydrochlorothiazide (Avalide) 300-12.5 mg tablet, Take 1 tablet by mouth once daily., Disp: , Rfl:     krill oil 500 mg capsule, Take 1 capsule (500 mg) by mouth once daily., Disp: , Rfl:     levothyroxine (Synthroid, Levoxyl) 125 mcg tablet, Take 1 tablet (125 mcg) by mouth early in the morning.., Disp: , Rfl:     multivitamin tablet, Take 1 tablet by mouth once daily., Disp: , Rfl:     NON FORMULARY, Take 2 each by mouth once daily. MORINGA, Disp: , Rfl:     red yeast rice 600 mg capsule, Take 2 capsules by mouth once daily., Disp: , Rfl:     rOPINIRole (Requip) 0.5 mg tablet, Take 1 tablet (0.5 mg) by mouth once daily at bedtime., Disp: , Rfl:      Diagnoses/Problems:  Bilateral elbow arthritis    Physical Examination:  Patient elbow incision is dry, intact and healing well. Drain incision is well healed. Minimal swelling and ecchymosis  around elbow. No erythema or warmth. Neurovascularly intact. No pain with flexion and extension of wrist and thumb. Minimal pain with active flexion/extension of elbow about 20-90 degrees. Pronation/Supination of 80 degrees.     Results/Imaging:  Excellent alignment of the cemented elbow replacement. I personally spent time viewing digital images of the radiology testing with the patient. Radiology results discussed with Dr. Flores.       *While intending to generate a timely document that accurately reflects the content of the visit, no guarantee can be provided that every grammatical or spelling mistake has been or will be identified or corrected. Thank you for your understanding.

## 2024-12-10 ENCOUNTER — APPOINTMENT (OUTPATIENT)
Dept: ORTHOPEDIC SURGERY | Facility: HOSPITAL | Age: 69
End: 2024-12-10
Payer: MEDICARE

## 2025-01-02 NOTE — PROGRESS NOTES
Subjective   Patient ID: Florina Doll is a 69 y.o. female    Chief Complaint: No chief complaint on file.       Last Surgery: Left Elbow Total Arthroplasty - Left  Date of Last Surgery: 11/18/2024    HPI  Florina Doll is a 69 y.o. right hand dominant female presenting for bilateral elbow arthritis right > left.     She was last seen by Irma at the beginning of the month. She was provided a prescription for occupational therapy. She does have numbness and tingling. Injections have somewhat helped.     She does have inflammatory arthritis in other joints. She has had bilateral knee replacements.     11/25/24  Florina Doll is a 69 y.o. female returning to the clinic for a follow up visit regarding her left elbow. She is s/p left elbow total arthroplasty done 11/18/24.    She is feeling well.     1/2/25  Florina Doll is a 69 y.o. female returning to the clinic for a follow up visit regarding her left elbow.     She is doing well. Her pain is well controlled and she has been compliant with physical therapy.    Objective   Patient is a well-developed, well-nourished female in no acute distress.  Breathes with normal chest rises.  Pupils are round and symmetric today.  Awake, alert, and oriented x3.     Examination of the left elbow today reveals the skin to be intact, incision is well healed. 5 out of 5 wrist flexion, wrist extension, and thumb extension bilaterally. Sensation is intact to light touch to median, ulnar, and radial nerves bilaterally. Stable to varus and valgus stress bilaterally.  Range of motion of the left elbow revealed 15-90° of flexion and extension and 85 degrees of supination and pronation. Patient had no tenderness to palpation at their medial or lateral epicondyle. 4+/5 triceps strength    Imaging:  X-rays of the left elbow taken today personally ordered and interpreted by me show excellent alignment of reverse elbow replacement     Assessment/Plan   History of arthroplasty of left  elbow  Patient 6 week s/p left total elbow arthroplasty.    She may discard her brace today. She should not lift more than a coffee cup at this time. I want her to keep pushing her extension as she cannot damage this. We have updated her physical therapy script. We will see her back in 6 weeks for a repeat clinical exam.     Orders Placed This Encounter    XR elbow left 3+ views     Follow up in 6 weeks    Scribe Attestation  By signing my name below, I, Shikha Traore   attest that this documentation has been prepared under the direction and in the presence of Michael Flores MD.

## 2025-01-03 ENCOUNTER — OFFICE VISIT (OUTPATIENT)
Dept: ORTHOPEDIC SURGERY | Facility: HOSPITAL | Age: 70
End: 2025-01-03
Payer: MEDICARE

## 2025-01-03 ENCOUNTER — HOSPITAL ENCOUNTER (OUTPATIENT)
Dept: RADIOLOGY | Facility: HOSPITAL | Age: 70
Discharge: HOME | End: 2025-01-03
Payer: MEDICARE

## 2025-01-03 VITALS — HEIGHT: 60 IN | BODY MASS INDEX: 35.93 KG/M2 | WEIGHT: 183 LBS

## 2025-01-03 DIAGNOSIS — Z96.622: Primary | ICD-10-CM

## 2025-01-03 DIAGNOSIS — Z96.622: ICD-10-CM

## 2025-01-03 PROCEDURE — 73080 X-RAY EXAM OF ELBOW: CPT | Mod: LT

## 2025-01-03 PROCEDURE — 99211 OFF/OP EST MAY X REQ PHY/QHP: CPT | Performed by: ORTHOPAEDIC SURGERY

## 2025-01-03 PROCEDURE — 3008F BODY MASS INDEX DOCD: CPT | Performed by: ORTHOPAEDIC SURGERY

## 2025-01-03 PROCEDURE — 1159F MED LIST DOCD IN RCRD: CPT | Performed by: ORTHOPAEDIC SURGERY

## 2025-01-03 ASSESSMENT — PAIN - FUNCTIONAL ASSESSMENT: PAIN_FUNCTIONAL_ASSESSMENT: NO/DENIES PAIN

## 2025-01-06 DIAGNOSIS — M19.029 ELBOW ARTHRITIS: Primary | ICD-10-CM

## 2025-02-04 ENCOUNTER — OFFICE VISIT (OUTPATIENT)
Dept: ORTHOPEDIC SURGERY | Facility: HOSPITAL | Age: 70
End: 2025-02-04
Payer: MEDICARE

## 2025-02-04 ENCOUNTER — HOSPITAL ENCOUNTER (OUTPATIENT)
Dept: RADIOLOGY | Facility: HOSPITAL | Age: 70
Discharge: HOME | End: 2025-02-04
Payer: MEDICARE

## 2025-02-04 VITALS — WEIGHT: 185 LBS | BODY MASS INDEX: 34.93 KG/M2 | HEIGHT: 61 IN

## 2025-02-04 DIAGNOSIS — M19.022 ARTHRITIS OF ELBOW, LEFT: ICD-10-CM

## 2025-02-04 PROCEDURE — 1159F MED LIST DOCD IN RCRD: CPT | Performed by: NURSE PRACTITIONER

## 2025-02-04 PROCEDURE — 73080 X-RAY EXAM OF ELBOW: CPT | Mod: LT

## 2025-02-04 PROCEDURE — 1125F AMNT PAIN NOTED PAIN PRSNT: CPT | Performed by: NURSE PRACTITIONER

## 2025-02-04 PROCEDURE — 73080 X-RAY EXAM OF ELBOW: CPT | Mod: LEFT SIDE | Performed by: RADIOLOGY

## 2025-02-04 PROCEDURE — 3008F BODY MASS INDEX DOCD: CPT | Performed by: NURSE PRACTITIONER

## 2025-02-04 PROCEDURE — 99211 OFF/OP EST MAY X REQ PHY/QHP: CPT | Performed by: NURSE PRACTITIONER

## 2025-02-04 PROCEDURE — 1160F RVW MEDS BY RX/DR IN RCRD: CPT | Performed by: NURSE PRACTITIONER

## 2025-02-04 PROCEDURE — 1036F TOBACCO NON-USER: CPT | Performed by: NURSE PRACTITIONER

## 2025-02-04 ASSESSMENT — PAIN SCALES - GENERAL: PAINLEVEL_OUTOF10: 3

## 2025-02-04 ASSESSMENT — PAIN - FUNCTIONAL ASSESSMENT: PAIN_FUNCTIONAL_ASSESSMENT: 0-10

## 2025-02-04 NOTE — PROGRESS NOTES
Provider Impression/Assessment:  Florina Doll is a pleasant 69 y.o. female who is returning to clinic for their repeat postoperative visit. They are 2 weeks S/P left elbow total arthroplasty, done 11/18/24.     Patient Discussion/Plan:  Florina Doll is doing very well since surgery. Her incision is healing well. Will start with therapy this week for range of motion only. Remain with no lifting over a coffee cup for the next 5 weeks. Talked about 3-4 month total recovery, up through the first 6 months. For the next month she should stay in the elbow brace, locked at 90 flexion and 0 of extension. This brace should be worn at all times. She can come out of the brace to shower and do her daily exercises. Otherwise it should be worn at all times and when sleeping. May extend the elbow as tolerated actively and passively now. No passive movement with flexion passed 90 degrees at this time. Continue with active and passive range of motion of the wrist and hand as tolerated. No lifting over a coffee cup for the next 5 weeks.     Prescription for occupational therapy for active and passive range of motion limitations explained. Theray should start this week. Will discuss strengthening when she is seen again in 4 weeks as that time as she will be approximately 2 months out from surgery.    We have scheduled to have Florina Doll see us back in 4 weeks for a repeat clinical check and repeat LEFT ELBOW XR at that time. We will likely progress to more aggressive range of motion and lite strengthening up to 5 pounds at 2 months from surgery.     She will remain limited to no strengthening or weight-bearing for the next 6 weeks. The goal is to return to regular activities at 3-4 months, but limited to no lifting over 10 pounds indefinitely. Can take up to 6 months for a full recovery.     We will review timing of her right elbow replacement surgery at her next follow up visit. She is aware that she would need a 3 month  minimum window between the two replacements to optimize healing.      All questions were answered today to their satisfaction and they know how to reach our office if needed prior to the next scheduled visit with our office. Patient was discussed with Dr Flores and he agrees with the above plan.    Should you have any questions or concerns after your visit today, please do not hesitate to call the office at [phone]. I am honored to be a provider on your health care team and remain dedicated to helping you achieve your healthcare goals    -------------------------------------------------------------------------------------------------  CHIEF COMPLAINT:  POV LEFT ELBOW REPLACEMENT   DOS: 11/18/24    History of Present Illness:  11/25/24  Florina Doll is a 69 y.o. female returning to the clinic for a follow up visit regarding her left elbow. She is s/p left elbow total arthroplasty done 11/18/24. She is feeling well.     12/3/24  Florina Doll returns to clinic for their left elbow. They are 2 weeks S/P left elbow total arthroplasty, done 11/18/24. Suture tails trimmed today. She has been wearing her elbow flexible brace and ace wrap. Dionne denies any new injury to the elbow since her last visit. No concerns. She reports that her left elbow already has better pain relief than her right elbow, which has known arthritis as well. Pain is well managed. Denies use of narcotic pain medication at this time. Sleeping without difficulty. Denies redness or warmth at incision site. Denies any fever, chills, or paresthesia. They have been compliant with restrictions.     2/4/25  Florina Doll returns to clinic for their left elbow. They are 11 weeks S/P left elbow total arthroplasty, done 11/18/24. Left elbow XR performed today. Dionne is doing well. She reports some soreness in the left forearm. Florina has continued with physical therapy, no concerns.     Review of Systems:   Review of systems for all 14 systems is  negative for complaint except for the above noted findings in the history of present illness.    Family, social, and medical histories are obtained and reviewed.    Allergies:  Allergies   Allergen Reactions    Clarithromycin Swelling    Sulfa (Sulfonamide Antibiotics) Itching and Rash    Topiramate Rash, Other and Swelling     Other reaction(s): Other: See Comments   Hands and feet go numb    Hands and feet go numb    Cefazolin Rash    Iodinated Contrast Media Itching and Rash     ITCHED ALL OVER    Morphine Hives, Itching and Rash       Medications:    Current Outpatient Medications:     acetaminophen (Tylenol) 500 mg tablet, Take 2 tablets (1,000 mg) by mouth every 8 hours if needed for mild pain (1 - 3)., Disp: , Rfl:     amLODIPine (Norvasc) 5 mg tablet, Take 1 tablet (5 mg) by mouth once daily., Disp: , Rfl:     b complex vitamins capsule, Take 1 capsule by mouth once daily., Disp: , Rfl:     BERBERINE CHLORIDE ORAL, Take 1,500 mg by mouth once daily., Disp: , Rfl:     calcium carbonate (Tums) 200 mg calcium chewable tablet, Chew 1 tablet (500 mg) 4 times a day as needed for indigestion or heartburn., Disp: , Rfl:     calcium carbonate-vitamin D3 600 mg-5 mcg (200 unit) tablet, Take 2 tablets by mouth once daily., Disp: , Rfl:     cetirizine (ZyrTEC) 10 mg tablet, Take 1 tablet (10 mg) by mouth once daily in the morning. Take before meals., Disp: , Rfl:     garlic 1,000 mg capsule, Take 1 capsule by mouth once daily., Disp: , Rfl:     ibuprofen 800 mg tablet, Take 1 tablet (800 mg) by mouth 3 times a day., Disp: , Rfl:     irbesartan-hydrochlorothiazide (Avalide) 300-12.5 mg tablet, Take 1 tablet by mouth once daily., Disp: , Rfl:     krill oil 500 mg capsule, Take 1 capsule (500 mg) by mouth once daily., Disp: , Rfl:     levothyroxine (Synthroid, Levoxyl) 125 mcg tablet, Take 1 tablet (125 mcg) by mouth early in the morning.., Disp: , Rfl:     multivitamin tablet, Take 1 tablet by mouth once daily., Disp: ,  Rfl:     NON FORMULARY, Take 2 each by mouth once daily. SHANNANNGA, Disp: , Rfl:     red yeast rice 600 mg capsule, Take 2 capsules by mouth once daily., Disp: , Rfl:     rOPINIRole (Requip) 0.5 mg tablet, Take 1 tablet (0.5 mg) by mouth once daily at bedtime., Disp: , Rfl:     Diagnoses/Problems:  Bilateral elbow arthritis    Physical Examination:  Patient elbow incision is dry, intact and healing well. Drain incision is well healed. Minimal swelling and ecchymosis  around elbow. No erythema or warmth. Neurovascularly intact. No pain with flexion and extension of wrist and thumb. Minimal pain with active flexion/extension of elbow about 20-90 degrees. Pronation/Supination of 80 degrees.     Results/Imaging:  Excellent alignment of the cemented elbow replacement. I personally spent time viewing digital images of the radiology testing with the patient. Radiology results discussed with Dr. Flores.       *While intending to generate a timely document that accurately reflects the content of the visit, no guarantee can be provided that every grammatical or spelling mistake has been or will be identified or corrected. Thank you for your understanding.

## 2025-02-27 ENCOUNTER — CLINICAL SUPPORT (OUTPATIENT)
Dept: PREADMISSION TESTING | Facility: HOSPITAL | Age: 70
End: 2025-02-27
Payer: MEDICARE

## 2025-02-27 DIAGNOSIS — M19.029 ELBOW ARTHRITIS: ICD-10-CM

## 2025-02-27 NOTE — CPM/PAT NURSE NOTE
"CPM/PAT Nurse Note      Name: Florina Doll (Florina Doll \"Dionne\")  /Age: 1955/70 y.o.       Past Medical History:   Diagnosis Date    Arthritis of elbow, left     s/p Left Elbow Total Arthroplasty 2024    Arthritis of elbow, right     Plan: Right Elbow Total Arthroplasty 3/19/2025    Cleft lip and cleft palate ([facility]HS-HCC)     Diverticulosis     DVT (deep venous thrombosis) (Multi)     Right poipliteal, after TKA    Hearing aid worn     Right ear only    Hyperlipidemia     Hypertension     Hypothyroidism     Obesity     PONV (postoperative nausea and vomiting)     Primary osteoarthritis, right ankle and foot     s/p ARTHRODESIS MIDTARSAL OR TARSOMETATARSAL, MULTIPLE OR TRANSVERSE   Right 23    RLS (restless legs syndrome)        Past Surgical History:   Procedure Laterality Date    ADENOIDECTOMY      ANKLE FRACTURE SURGERY      APPENDECTOMY      CHOLECYSTECTOMY      COLON SURGERY      resection for diverticulitis    COLONOSCOPY      DILATION AND CURETTAGE OF UTERUS      ,  lysis of adhesions    ELBOW ARTHROPLASTY Left 2024    ELBOW FUSION Left     FOOT FUSION Right 2023    ARTHRODESIS MIDTARSAL OR TARSOMETATARSAL, MULTIPLE OR TRANSVERSE    FOOT FUSION Right 2023    REVISION    FOOT SURGERY Bilateral     spherical implants    HARDWARE REMOVAL FOOT / ANKLE Left     HARDWARE REMOVAL FOOT / ANKLE  2023    REMOVAL BURIED HARDWAR    LASIK Bilateral     MOUTH SURGERY      multiple surgeries on cleft lip and palate    ORIF ANKLE FRACTURE Left     TONSILLECTOMY      TOTAL KNEE ARTHROPLASTY Right 2019    TOTAL KNEE ARTHROPLASTY Left     TYMPANOSTOMY TUBE PLACEMENT      WRIST SURGERY Right        Patient Sexual activity questions deferred to the physician.    Family History   Problem Relation Name Age of Onset    Depression Mother Kathi     Hyperlipidemia Father Dad     Hypertension Father Dad     Accidental death Brother Cosmo         MVA    " Other (other) Brother          septic shock post op    Stroke Paternal Grandfather Akbar Santos     Arthritis Paternal Grandmother Kareen Lorenzo     Heart disease Paternal Grandmother Kareen Lorenzo     Heart disease Brother Hayden        Allergies   Allergen Reactions    Clarithromycin Swelling    Sulfa (Sulfonamide Antibiotics) Itching and Rash    Topiramate Swelling, Rash and Other     Hands and feet go numb    Cefazolin Rash    Iodinated Contrast Media Itching and Rash     ITCHED ALL OVER    Morphine Hives, Itching and Rash       Prior to Admission medications    Medication Sig Start Date End Date Taking? Authorizing Provider   acetaminophen (Tylenol) 500 mg tablet Take 2 tablets (1,000 mg) by mouth every 8 hours if needed for mild pain (1 - 3).    Historical Provider, MD   amLODIPine (Norvasc) 5 mg tablet Take 1 tablet (5 mg) by mouth once daily. 10/4/24   Historical Provider, MD   b complex vitamins capsule Take 1 capsule by mouth once daily.    Historical Provider, MD   BERBERINE CHLORIDE ORAL Take 1,500 mg by mouth once daily.    Historical Provider, MD   calcium carbonate (Tums) 200 mg calcium chewable tablet Chew 1 tablet (500 mg) 4 times a day as needed for indigestion or heartburn.    Historical Provider, MD   calcium carbonate-vitamin D3 600 mg-5 mcg (200 unit) tablet Take 2 tablets by mouth once daily.    Historical Provider, MD   cetirizine (ZyrTEC) 10 mg tablet Take 1 tablet (10 mg) by mouth once daily in the morning. Take before meals.    Historical Provider, MD   garlic 1,000 mg capsule Take 1 capsule by mouth once daily.    Historical Provider, MD   ibuprofen 800 mg tablet Take 1 tablet (800 mg) by mouth 3 times a day.    Historical Provider, MD   irbesartan-hydrochlorothiazide (Avalide) 300-12.5 mg tablet Take 1 tablet by mouth once daily. 10/4/24 10/4/25  Historical Provider, MD   krill oil 500 mg capsule Take 1 capsule (500 mg) by mouth once daily.    Historical Provider, MD   levothyroxine (Synthroid,  Levoxyl) 125 mcg tablet Take 1 tablet (125 mcg) by mouth early in the morning.. 8/2/24   Historical Provider, MD   multivitamin tablet Take 1 tablet by mouth once daily.    Historical Provider, MD   NON FORMULARY Take 2 each by mouth once daily. MORINGA    Historical Provider, MD   red yeast rice 600 mg capsule Take 2 capsules by mouth once daily.    Historical Provider, MD   rOPINIRole (Requip) 0.5 mg tablet Take 1 tablet (0.5 mg) by mouth once daily at bedtime.    Historical Provider, MD        PAT ROS     DASI Risk Score      Flowsheet Row Questionnaire Series Submission from 1/6/2025 in [facility] with Generic Provider Edu   Can you take care of yourself (eat, dress, bathe, or use toilet)?  2.75  filed at 01/06/2025 1947   Can you walk indoors, such as around your house? 1.75  filed at 01/06/2025 1947   Can you walk a block or two on level ground?  2.75  filed at 01/06/2025 1947   Can you climb a flight of stairs or walk up a hill? 5.5  filed at 01/06/2025 1947   Can you run a short distance? 0  filed at 01/06/2025 1947   Can you do light work around the house like dusting or washing dishes? 2.7  filed at 01/06/2025 1947   Can you do moderate work around the house like vacuuming, sweeping floors or carrying groceries? 3.5  filed at 01/06/2025 1947   Can you do heavy work around the house like scrubbing floors or lifting and moving heavy furniture?  0  filed at 01/06/2025 1947   Can you do yard work like raking leaves, weeding or pushing a mower? 0  filed at 01/06/2025 1947   Can you have sexual relations? 5.25  filed at 01/06/2025 1947   Can you participate in moderate recreational activities like golf, bowling, dancing, doubles tennis or throwing a baseball or football? 0  filed at 01/06/2025 1947   Can you participate in strenous sports like swimming, singles tennis, football, basketball, or skiing? 0  filed at 01/06/2025 1947   DASI SCORE 24.2  filed at 01/06/2025 1947   METS Score (Will be calculated  only when all the questions are answered) 5.7  filed at 01/06/2025 1947          Caprini DVT Assessment      Flowsheet Row Admission (Discharged) [facility] 11/18/[facility]24 [facility] Bldg A 7 with Michael Flores MD Admission (Discharged) from 10/30/2024 in [facility] OR with Michael Flores MD   DVT Score (IF A SCORE IS NOT CALCULATING, MUST SELECT A BMI TO COMPLETE) 6 filed at 11/18/2024 0727 8 filed at 10/30/2024 1057   Surgical Factors Major surgery planned, lasting 2-3 hours filed at 11/18/2024 0727 Major surgery planned, lasting 2-3 hours filed at 10/30/2024 1057   BMI (BMI MUST BE CHOSEN) 30 or less filed at 11/18/2024 0727 31-40 (Obesity) filed at 10/30/2024 1057          Modified Frailty Index    No data to display       CHADS2 Stroke Risk  Current as of yesterday        N/A 3 to 100%: High Risk   2 to < 3%: Medium Risk   0 to < 2%: Low Risk     Last Change: N/A          This score determines the patient's risk of having a stroke if the patient has atrial fibrillation.        This score is not applicable to this patient. Components are not calculated.          Revised Cardiac Risk Index    No data to display       Apfel Simplified Score    No data to display       Risk Analysis Index Results This Encounter    No data found in the last 10 encounters.       Stop Bang Score      Flowsheet Row Questionnaire Series Submission from 1/6/2025 in [facility] with Generic Provider Edu   Do you snore loudly? 0  filed at 01/06/2025 1947   Do you often feel tired or fatigued after your sleep? 0  filed at 01/06/2025 1947   Has anyone ever observed you stop breathing in your sleep? 0  filed at 01/06/2025 1947   Do you have or are you being treated for high blood pressure? 1  filed at 01/06/2025 1947   Recent BMI (Calculated) 35.7  filed at 01/06/2025 1947   Is BMI greater than 35 kg/m2? 1=Yes  filed at 01/06/2025 1947   Age older than 50 years old? 1=Yes  filed at 01/06/2025 1947   Gender -  Male 0=No  filed at 01/06/2025 1947          Prodigy: High Risk  Total Score: 8              Prodigy Age Score           ARISCAT Score for Postoperative Pulmonary Complications    No data to display       Dilip Perioperative Risk for Myocardial Infarction or Cardiac Arrest (SOUMYA)    No data to display         Nurse Plan of Action: RN screening call complete.  Reviewed allergies, medications and pharmacy, medical, surgical and social history with patient.  Chart updated.

## 2025-03-06 ENCOUNTER — PRE-ADMISSION TESTING (OUTPATIENT)
Dept: PREADMISSION TESTING | Facility: HOSPITAL | Age: 70
End: 2025-03-06
Payer: MEDICARE

## 2025-03-06 ENCOUNTER — LAB (OUTPATIENT)
Dept: LAB | Facility: HOSPITAL | Age: 70
End: 2025-03-06
Payer: MEDICARE

## 2025-03-06 VITALS
HEIGHT: 61 IN | SYSTOLIC BLOOD PRESSURE: 132 MMHG | RESPIRATION RATE: 16 BRPM | HEART RATE: 82 BPM | WEIGHT: 189.6 LBS | TEMPERATURE: 97.3 F | OXYGEN SATURATION: 97 % | DIASTOLIC BLOOD PRESSURE: 76 MMHG | BODY MASS INDEX: 35.8 KG/M2

## 2025-03-06 DIAGNOSIS — I10 HYPERTENSION, UNSPECIFIED TYPE: Primary | ICD-10-CM

## 2025-03-06 DIAGNOSIS — I10 ESSENTIAL (PRIMARY) HYPERTENSION: Primary | ICD-10-CM

## 2025-03-06 LAB
ANION GAP SERPL CALC-SCNC: 11 MMOL/L (ref 10–20)
ATRIAL RATE: 66 BPM
BASOPHILS # BLD AUTO: 0.08 X10*3/UL (ref 0–0.1)
BASOPHILS NFR BLD AUTO: 1.4 %
BUN SERPL-MCNC: 19 MG/DL (ref 6–23)
CALCIUM SERPL-MCNC: 9.9 MG/DL (ref 8.6–10.3)
CHLORIDE SERPL-SCNC: 102 MMOL/L (ref 98–107)
CO2 SERPL-SCNC: 31 MMOL/L (ref 21–32)
CREAT SERPL-MCNC: 0.69 MG/DL (ref 0.5–1.05)
EGFRCR SERPLBLD CKD-EPI 2021: >90 ML/MIN/1.73M*2
EOSINOPHIL # BLD AUTO: 0.18 X10*3/UL (ref 0–0.7)
EOSINOPHIL NFR BLD AUTO: 3.1 %
ERYTHROCYTE [DISTWIDTH] IN BLOOD BY AUTOMATED COUNT: 12.6 % (ref 11.5–14.5)
GLUCOSE SERPL-MCNC: 117 MG/DL (ref 74–99)
HCT VFR BLD AUTO: 45.1 % (ref 36–46)
HGB BLD-MCNC: 15.3 G/DL (ref 12–16)
IMM GRANULOCYTES # BLD AUTO: 0.01 X10*3/UL (ref 0–0.7)
IMM GRANULOCYTES NFR BLD AUTO: 0.2 % (ref 0–0.9)
LYMPHOCYTES # BLD AUTO: 1.53 X10*3/UL (ref 1.2–4.8)
LYMPHOCYTES NFR BLD AUTO: 26.3 %
MCH RBC QN AUTO: 30.4 PG (ref 26–34)
MCHC RBC AUTO-ENTMCNC: 33.9 G/DL (ref 32–36)
MCV RBC AUTO: 90 FL (ref 80–100)
MONOCYTES # BLD AUTO: 0.29 X10*3/UL (ref 0.1–1)
MONOCYTES NFR BLD AUTO: 5 %
NEUTROPHILS # BLD AUTO: 3.73 X10*3/UL (ref 1.2–7.7)
NEUTROPHILS NFR BLD AUTO: 64 %
NRBC BLD-RTO: 0 /100 WBCS (ref 0–0)
P AXIS: 16 DEGREES
P OFFSET: 205 MS
P ONSET: 158 MS
PLATELET # BLD AUTO: 189 X10*3/UL (ref 150–450)
POTASSIUM SERPL-SCNC: 4.4 MMOL/L (ref 3.5–5.3)
PR INTERVAL: 132 MS
Q ONSET: 224 MS
QRS COUNT: 10 BEATS
QRS DURATION: 120 MS
QT INTERVAL: 414 MS
QTC CALCULATION(BAZETT): 434 MS
QTC FREDERICIA: 427 MS
R AXIS: 9 DEGREES
RBC # BLD AUTO: 5.04 X10*6/UL (ref 4–5.2)
SODIUM SERPL-SCNC: 140 MMOL/L (ref 136–145)
T AXIS: -4 DEGREES
T OFFSET: 431 MS
VENTRICULAR RATE: 66 BPM
WBC # BLD AUTO: 5.8 X10*3/UL (ref 4.4–11.3)

## 2025-03-06 PROCEDURE — 93010 ELECTROCARDIOGRAM REPORT: CPT | Performed by: INTERNAL MEDICINE

## 2025-03-06 PROCEDURE — 93005 ELECTROCARDIOGRAM TRACING: CPT | Performed by: NURSE PRACTITIONER

## 2025-03-06 PROCEDURE — 99204 OFFICE O/P NEW MOD 45 MIN: CPT | Performed by: NURSE PRACTITIONER

## 2025-03-06 PROCEDURE — 87081 CULTURE SCREEN ONLY: CPT | Mod: AHULAB | Performed by: NURSE PRACTITIONER

## 2025-03-06 PROCEDURE — 80048 BASIC METABOLIC PNL TOTAL CA: CPT

## 2025-03-06 PROCEDURE — 85025 COMPLETE CBC W/AUTO DIFF WBC: CPT

## 2025-03-06 RX ORDER — CHLORHEXIDINE GLUCONATE ORAL RINSE 1.2 MG/ML
SOLUTION DENTAL
Qty: 473 ML | Refills: 0 | Status: SHIPPED | OUTPATIENT
Start: 2025-03-06

## 2025-03-06 ASSESSMENT — ENCOUNTER SYMPTOMS
CARDIOVASCULAR NEGATIVE: 1
NEUROLOGICAL NEGATIVE: 1
NECK NEGATIVE: 1
CONSTITUTIONAL NEGATIVE: 1
RESPIRATORY NEGATIVE: 1
MUSCULOSKELETAL NEGATIVE: 1
ENDOCRINE NEGATIVE: 1
GASTROINTESTINAL NEGATIVE: 1

## 2025-03-06 NOTE — CPM/PAT H&P
"CPM/PAT Evaluation       Name: Florina Doll (Florina Doll \"Dionne\")  /Age: 1955/70 y.o.     In-Person         Date of Consult: 3/6/25    Referring Provider:  Dr. 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      Pt denies any past history of anesthetic complications such as PONV, awareness, prolonged sedation, dental damage, aspiration, cardiac arrest, difficult intubation, difficult I.V. access or unexpected hospital admissions. No history of malignant hyperthermia and or pseudocholinesterase deficiency.    No history of blood transfusions.    The patient IS NOT a [employer] and will accept blood and blood products if medically indicated.     Type and screen NOT sent.      Past Medical History:   Diagnosis Date    Arthritis of elbow, left     s/p Left Elbow Total Arthroplasty 2024    Arthritis of elbow, right     Plan: Right Elbow Total Arthroplasty 3/19/2025    Cleft lip and cleft palate (HHS-HCC)     Diverticulosis     DVT (deep venous thrombosis) (Multi)     Right poipliteal, after TKA    Hearing aid worn     Right ear only    Hyperlipidemia     Hypertension     Hypothyroidism     Obesity     PONV (postoperative nausea and vomiting)     Primary osteoarthritis, right ankle and foot     s/p ARTHRODESIS MIDTARSAL OR TARSOMETATARSAL, MULTIPLE OR TRANSVERSE   Right 23    RLS (restless legs syndrome)        Past Surgical History:   Procedure Laterality Date    ADENOIDECTOMY      ANKLE FRACTURE SURGERY      APPENDECTOMY      CHOLECYSTECTOMY      COLON SURGERY      resection for diverticulitis    COLONOSCOPY      DILATION AND CURETTAGE OF UTERUS      ,  lysis of adhesions    ELBOW ARTHROPLASTY Left 2024    ELBOW " FUSION Left 1978    FOOT FUSION Right 08/2023    ARTHRODESIS MIDTARSAL OR TARSOMETATARSAL, MULTIPLE OR TRANSVERSE    FOOT FUSION Right 12/2023    REVISION    FOOT SURGERY Bilateral     spherical implants    HARDWARE REMOVAL FOOT / ANKLE Left 1995    HARDWARE REMOVAL FOOT / ANKLE  12/12/2023    REMOVAL BURIED HARDWAR    LASIK Bilateral 1999    MOUTH SURGERY      multiple surgeries on cleft lip and palate    ORIF ANKLE FRACTURE Left 1994    TONSILLECTOMY      TOTAL KNEE ARTHROPLASTY Right 2019    TOTAL KNEE ARTHROPLASTY Left     TYMPANOSTOMY TUBE PLACEMENT      WRIST SURGERY Right 2005       Family History   Problem Relation Name Age of Onset    Depression Mother Kathi     Hyperlipidemia Father Dad     Hypertension Father Dad     Accidental death Brother Cosmo         MVA    Other (other) Brother          septic shock post op    Stroke Paternal Grandfather Akbar Santos     Arthritis Paternal Grandmother Kareen Lorenzo     Heart disease Paternal Grandmother Kareen Lorenzo     Heart disease Brother Hayden        Allergies   Allergen Reactions    Clarithromycin Swelling    Sulfa (Sulfonamide Antibiotics) Itching and Rash    Topiramate Swelling, Rash and Other     Hands and feet go numb    Cefazolin Rash    Iodinated Contrast Media Itching and Rash     ITCHED ALL OVER    Morphine Hives, Itching and Rash     Social History     Tobacco Use   Smoking Status Never   Smokeless Tobacco Never     Social History     Substance and Sexual Activity   Alcohol Use Never     Social History     Substance and Sexual Activity   Drug Use Never       Current Outpatient Medications   Medication Instructions    acetaminophen (TYLENOL) 1,000 mg, Every 8 hours PRN    amLODIPine (NORVASC) 5 mg, Daily    b complex vitamins capsule 1 capsule, Daily    BERBERINE CHLORIDE ORAL 1,500 mg, Daily    calcium carbonate (Tums) 200 mg calcium chewable tablet 1 tablet, 4 times daily PRN    calcium carbonate-vitamin D3 600 mg-5 mcg (200 unit) tablet 2 tablets, Daily     "cetirizine (ZYRTEC) 10 mg, Daily before breakfast    chlorhexidine (Peridex) 0.12 % solution SWISH AND SPIT 15ML FOR 30 SECONDS NIGHT PRIOR TO SURGERY AND MORNING OF SURGERY    garlic 1,000 mg capsule 1 capsule, Daily    ibuprofen 800 mg, 3 times daily    irbesartan-hydrochlorothiazide (Avalide) 300-12.5 mg tablet 1 tablet, Daily RT    krill oil 500 mg capsule 1 capsule, Daily    levothyroxine (Synthroid, Levoxyl) 125 mcg tablet 1 tablet, Daily (0630)    multivitamin tablet 1 tablet, Daily    NON FORMULARY 2 each, Daily    red yeast rice 600 mg capsule 2 capsules, Daily    rOPINIRole (REQUIP) 0.5 mg, Nightly        PAT ROS:   Constitutional:   neg    Neuro/Psych:   neg    Eyes:    use of corrective lenses  Ears:    hearing loss   hearing aides  Nose:   neg    Mouth:   neg    Throat:   neg    Neck:   neg    Cardio:   neg    Respiratory:   neg    Endocrine:   neg    GI:   neg    :   neg    Musculoskeletal:   neg    Hematologic:   neg    Skin:  neg        PAT Physical Exam     Airway      Visit Vitals  /76   Pulse 82   Temp 36.3 °C (97.3 °F)   Resp 16   Ht 1.549 m (5' 0.98\")   Wt 86 kg (189 lb 9.5 oz)   SpO2 97%   BMI 35.84 kg/m²   OB Status Postmenopausal   Smoking Status Never   BSA 1.92 m²       Assessment and Plan:     Patient is a    Patient is at acceptable risk to proceed with planned surgical procedure. Further cardiac risk stratification deferred at this time.This patient is LOW/INTERMEDIATE/HIGH risk candidate undergoing LOW/MODERATE/HIGH risk procedure, patient is medically optimized for surgery.    Plan      Neuro:  {CPMPATNEURO:54892}  {CPMPATDELIRIUMRISKFACTORS:37540}  {CPMPATSTROKE:67168}  {CPMPATCVARISKFACTORS:82230}        Cardiovascular:    RCRI:  Risk of Mace:      .dcv    functional capacity      EKG in PAT             Pulmonary:  {CPMPATPULM:33707}  {CPMPATPULMRISKFACTORS:15248}  Stop Bang score is *** placing patient at *** risk for LIMA  ARISCAT: {ARISCAT Score:06998} risk of in-hospital " postoperative pulmonary complication  PRODIGY: {Low/Medium/High:38429} risk for opioid induced respiratory depression  {CPMPATPULMEDUCATION:45348}        Renal/endo:  .ckd      GI/:      Heme:  Patient instructed to ambulate as soon as possible postoperatively to decrease thromboembolic risk.    Initiate mechanical DVT prophylaxis as soon as possible and initiate chemical prophylaxis when deemed safe from a bleeding standpoint post surgery.        Caprini=        Risk assessment complete.  Patient is scheduled for a LOW/INTERMEDIATE/HIGH surgical risk procedure.     IS/IS NOT considered medically optimized for the planned procedure.        Labs/testing obtained in PAT on 3/6/25: CBC, BMP, A1C, MRSA, EKG      Follow up/communication: none      Preoperative medication instructions were provided and reviewed with the patient.  Any additional testing or evaluation was explained to the patient.  Nothing by mouth instructions were discussed and patient's questions were answered prior to conclusion to this encounter.  Patient verbalized understanding of preoperative instructions given in preadmission testing; discharge instructions available in EMR.    This note was dictated with speech recognition.  Minor errors may have been detected during use of speech recognition.         scheduled for a INTERMEDIATE surgical risk procedure.  She is IS  considered medically optimized for the planned procedure.      Labs/testing obtained in PAT on 3/6/25: CBC, BMP, A1C, MRSA, EKG    Lab Results   Component Value Date    WBC 5.8 03/06/2025    HGB 15.3 03/06/2025    HCT 45.1 03/06/2025    MCV 90 03/06/2025     03/06/2025     Lab Results   Component Value Date    GLUCOSE 117 (H) 03/06/2025    CALCIUM 9.9 03/06/2025     03/06/2025    K 4.4 03/06/2025    CO2 31 03/06/2025     03/06/2025    BUN 19 03/06/2025    CREATININE 0.69 03/06/2025     Lab Results   Component Value Date    HGBA1C 6.0 (H) 10/25/2024           Follow up/communication: none      Preoperative medication instructions were provided and reviewed with the patient.  Any additional testing or evaluation was explained to the patient.  Nothing by mouth instructions were discussed and patient's questions were answered prior to conclusion to this encounter.  Patient verbalized understanding of preoperative instructions given in preadmission testing; discharge instructions available in EMR.    This note was dictated with speech recognition.  Minor errors may have been detected during use of speech recognition.

## 2025-03-06 NOTE — H&P (VIEW-ONLY)
"[facility]/PAT Evaluation       Name: Florina Doll (Florina Doll \"Dionne\")  /Age: 1955/70 y.o.     In-Person         Date of Consult: 3/6/25    Referring Provider:  Dr. Flores    Date, Surgery, and Length:  3/19/25, right elbow total arthoplasty, 180 minutes    Patient presents to [facility] for perioperative risk assessment prior to scheduled surgery. 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 She is s/o left total elbow arthroplasty 2024 and is doing well. She will now proceed with right elbow replacement.     This note was created in part upon personal review of patient's medical records.      Pt denies any past history of anesthetic complications such as PONV, awareness, prolonged sedation, dental damage, aspiration, cardiac arrest, difficult intubation, difficult I.V. access or unexpected hospital admissions. No history of malignant hyperthermia and or pseudocholinesterase deficiency.    No history of blood transfusions.    The patient IS NOT a Confucianist and will accept blood and blood products if medically indicated.     Type and screen NOT sent.      Past Medical History:   Diagnosis Date    Arthritis of elbow, left     s/p Left Elbow Total Arthroplasty 2024    Arthritis of elbow, right     Plan: Right Elbow Total Arthroplasty 3/19/2025    Cleft lip and cleft palate (HHS-HCC)     Diverticulosis     DVT (deep venous thrombosis) (Multi)     Right poipliteal, after TKA    Hearing aid worn     Right ear only    Hyperlipidemia     Hypertension     Hypothyroidism     Obesity     PONV (postoperative nausea and vomiting)     Primary osteoarthritis, right ankle and foot     s/p ARTHRODESIS MIDTARSAL OR TARSOMETATARSAL, MULTIPLE OR TRANSVERSE   Right 23    RLS (restless legs syndrome)        Past Surgical History:   Procedure Laterality Date    ADENOIDECTOMY      ANKLE FRACTURE SURGERY      APPENDECTOMY      CHOLECYSTECTOMY      COLON " SURGERY  1990    resection for diverticulitis    COLONOSCOPY      DILATION AND CURETTAGE OF UTERUS      1980, 1986 lysis of adhesions    ELBOW ARTHROPLASTY Left 11/18/2024    ELBOW FUSION Left 1978    FOOT FUSION Right 08/2023    ARTHRODESIS MIDTARSAL OR TARSOMETATARSAL, MULTIPLE OR TRANSVERSE    FOOT FUSION Right 12/2023    REVISION    FOOT SURGERY Bilateral     spherical implants    HARDWARE REMOVAL FOOT / ANKLE Left 1995    HARDWARE REMOVAL FOOT / ANKLE  12/12/2023    REMOVAL BURIED HARDWAR    LASIK Bilateral 1999    MOUTH SURGERY      multiple surgeries on cleft lip and palate    ORIF ANKLE FRACTURE Left 1994    TONSILLECTOMY      TOTAL KNEE ARTHROPLASTY Right 2019    TOTAL KNEE ARTHROPLASTY Left     TYMPANOSTOMY TUBE PLACEMENT      WRIST SURGERY Right 2005       Family History   Problem Relation Name Age of Onset    Depression Mother Kathi     Hyperlipidemia Father Dad     Hypertension Father Dad     Accidental death Brother Cosmo         MVA    Other (other) Brother          septic shock post op    Stroke Paternal Grandfather [facility]     Arthritis Paternal Grandmother Kareen Lorenzo     Heart disease Paternal Grandmother Kareen Lorenzo     Heart disease Brother Hayden        Allergies   Allergen Reactions    Clarithromycin Swelling    Sulfa (Sulfonamide Antibiotics) Itching and Rash    Topiramate Swelling, Rash and Other     Hands and feet go numb    Cefazolin Rash    Iodinated Contrast Media Itching and Rash     ITCHED ALL OVER    Morphine Hives, Itching and Rash     Social History     Tobacco Use   Smoking Status Never   Smokeless Tobacco Never     Social History     Substance and Sexual Activity   Alcohol Use Never     Social History     Substance and Sexual Activity   Drug Use Never       Current Outpatient Medications   Medication Instructions    acetaminophen (TYLENOL) 1,000 mg, Every 8 hours PRN    amLODIPine (NORVASC) 5 mg, Daily    b complex vitamins capsule 1 capsule, Daily    BERBERINE CHLORIDE ORAL  "1,500 mg, Daily    calcium carbonate (Tums) 200 mg calcium chewable tablet 1 tablet, 4 times daily PRN    calcium carbonate-vitamin D3 600 mg-5 mcg (200 unit) tablet 2 tablets, Daily    cetirizine (ZYRTEC) 10 mg, Daily before breakfast    chlorhexidine (Peridex) 0.12 % solution SWISH AND SPIT 15ML FOR 30 SECONDS NIGHT PRIOR TO SURGERY AND MORNING OF SURGERY    garlic 1,000 mg capsule 1 capsule, Daily    ibuprofen 800 mg, 3 times daily    irbesartan-hydrochlorothiazide (Avalide) 300-12.5 mg tablet 1 tablet, Daily RT    krill oil 500 mg capsule 1 capsule, Daily    levothyroxine (Synthroid, Levoxyl) 125 mcg tablet 1 tablet, Daily (0630)    multivitamin tablet 1 tablet, Daily    NON FORMULARY 2 each, Daily    red yeast rice 600 mg capsule 2 capsules, Daily    rOPINIRole (REQUIP) 0.5 mg, Nightly        PAT ROS:   Constitutional:   neg    Neuro/Psych:   neg    Eyes:    use of corrective lenses  Ears:    hearing loss   hearing aides  Nose:   neg    Mouth:   neg    Throat:   neg    Neck:   neg    Cardio:   neg    Respiratory:   neg    Endocrine:   neg    GI:   neg    :   neg    Musculoskeletal:   neg    Hematologic:   neg    Skin:  neg        Physical Exam  Vitals reviewed. Physical exam within normal limits.          PAT AIRWAY:   Airway:     Mallampati::  II    Neck ROM::  Full  normal          Visit Vitals  /76   Pulse 82   Temp 36.3 °C (97.3 °F)   Resp 16   Ht 1.549 m (5' 0.98\")   Wt 86 kg (189 lb 9.5 oz)   SpO2 97%   BMI 35.84 kg/m²   OB Status Postmenopausal   Smoking Status Never   BSA 1.92 m²       Assessment and Plan:     Patient is a 70 year old female scheduled for right elbow total arthroplasty with Dr. Flores on 3/19/25.    Patient is at acceptable risk to proceed with planned surgical procedure. Further cardiac risk stratification deferred at this time.This patient is INTERMEDIATE risk candidate undergoing MODERATE risk procedure, patient is medically optimized for " surgery.    Plan    Cardiovascular:    RCRI: 0 Risk of Mace: 3.9%      Patient denies any chest pain, tightness, heaviness, pressure, radiating pain, palpitations, irregular heartbeats, lightheadedness, cough, congestion, shortness of breath, RON, PND, near syncope, weight loss or gain.    Good functional capacity      EKG in PAT   Encounter Date: 03/06/25   ECG 12 Lead   Result Value    Ventricular Rate 66    Atrial Rate 66    WY Interval 132    QRS Duration 120    QT Interval 414    QTC Calculation(Bazett) 434    P Axis 16    R Axis 9    T Axis -4    QRS Count 10    Q Onset 224    P Onset 158    P Offset 205    T Offset 431    QTC Fredericia 427    Narrative    Normal sinus rhythm with sinus arrhythmia  Right bundle branch block  Abnormal ECG  When compared with ECG of 25-OCT-2024 10:38,  T wave inversion now evident in Anterior leads  Confirmed by Wesley La ([id number]) on 3/6/2025 12:53:44 PM     HTN- will continue Amlodipine, will hold Avalide 24 hours prior to surgery    Pulmonary:  No pulmonary diagnosis, however patient is at increased risk of perioperative complications secondary to  age > 60, obesity, duration of surgery > 2 hours, types of anesthetic  Stop Bang score is 3 placing patient at low risk for LIMA  ARISCAT: 26-44 points, 13.3% risk of in-hospital postoperative pulmonary complication  PRODIGY: Moderate risk for opioid induced respiratory depression    Renal/endo:  Recommendations to avoid nephrotoxic drugs and carefully monitor fluid status to maintain euvolemia. Use dose adjusted medications as needed for the underlying level of renal function.    Hypothyroidism- continue Levothyroxine    Heme:  Patient instructed to ambulate as soon as possible postoperatively to decrease thromboembolic risk.    Initiate mechanical DVT prophylaxis as soon as possible and initiate chemical prophylaxis when deemed safe from a bleeding standpoint post surgery.    Caprini= 4    Risk assessment complete.  Patient is  scheduled for a INTERMEDIATE surgical risk procedure.  She is IS  considered medically optimized for the planned procedure.      Labs/testing obtained in PAT on 3/6/25: CBC, BMP, A1C, MRSA, EKG    Lab Results   Component Value Date    WBC 5.8 03/06/2025    HGB 15.3 03/06/2025    HCT 45.1 03/06/2025    MCV 90 03/06/2025     03/06/2025     Lab Results   Component Value Date    GLUCOSE 117 (H) 03/06/2025    CALCIUM 9.9 03/06/2025     03/06/2025    K 4.4 03/06/2025    CO2 31 03/06/2025     03/06/2025    BUN 19 03/06/2025    CREATININE 0.69 03/06/2025     Lab Results   Component Value Date    HGBA1C 6.0 (H) 10/25/2024           Follow up/communication: none      Preoperative medication instructions were provided and reviewed with the patient.  Any additional testing or evaluation was explained to the patient.  Nothing by mouth instructions were discussed and patient's questions were answered prior to conclusion to this encounter.  Patient verbalized understanding of preoperative instructions given in preadmission testing; discharge instructions available in EMR.    This note was dictated with speech recognition.  Minor errors may have been detected during use of speech recognition.

## 2025-03-06 NOTE — CPM/PAT NURSE NOTE
"CPM/PAT Nurse Note      Name: Florina Doll (Florina Doll \"Dionne\")  /Age: 1955/70 y.o.       Past Medical History:   Diagnosis Date    Arthritis of elbow, left     s/p Left Elbow Total Arthroplasty 2024    Arthritis of elbow, right     Plan: Right Elbow Total Arthroplasty 3/19/2025    Cleft lip and cleft palate (HHS-HCC)     Diverticulosis     DVT (deep venous thrombosis) (Multi)     Right poipliteal, after TKA    Hearing aid worn     Right ear only    Hyperlipidemia     Hypertension     Hypothyroidism     Obesity     PONV (postoperative nausea and vomiting)     Primary osteoarthritis, right ankle and foot     s/p ARTHRODESIS MIDTARSAL OR TARSOMETATARSAL, MULTIPLE OR TRANSVERSE   Right 23    RLS (restless legs syndrome)        Past Surgical History:   Procedure Laterality Date    ADENOIDECTOMY      ANKLE FRACTURE SURGERY      APPENDECTOMY      CHOLECYSTECTOMY      COLON SURGERY      resection for diverticulitis    COLONOSCOPY      DILATION AND CURETTAGE OF UTERUS      ,  lysis of adhesions    ELBOW ARTHROPLASTY Left 2024    ELBOW FUSION Left     FOOT FUSION Right 2023    ARTHRODESIS MIDTARSAL OR TARSOMETATARSAL, MULTIPLE OR TRANSVERSE    FOOT FUSION Right 2023    REVISION    FOOT SURGERY Bilateral     spherical implants    HARDWARE REMOVAL FOOT / ANKLE Left     HARDWARE REMOVAL FOOT / ANKLE  2023    REMOVAL BURIED HARDWAR    LASIK Bilateral     MOUTH SURGERY      multiple surgeries on cleft lip and palate    ORIF ANKLE FRACTURE Left     TONSILLECTOMY      TOTAL KNEE ARTHROPLASTY Right 2019    TOTAL KNEE ARTHROPLASTY Left     TYMPANOSTOMY TUBE PLACEMENT      WRIST SURGERY Right        Patient Sexual activity questions deferred to the physician.    Family History   Problem Relation Name Age of Onset    Depression Mother Kathi     Hyperlipidemia Father Dad     Hypertension Father Dad     Accidental death Brother Cosmo         MVA    " Other (other) Brother          septic shock post op    Stroke Paternal Grandfather Akbar Santos     Arthritis Paternal Grandmother Kareen Lorenzo     Heart disease Paternal Grandmother Kareen Lorenzo     Heart disease Brother Hayden        Allergies   Allergen Reactions    Clarithromycin Swelling    Sulfa (Sulfonamide Antibiotics) Itching and Rash    Topiramate Swelling, Rash and Other     Hands and feet go numb    Cefazolin Rash    Iodinated Contrast Media Itching and Rash     ITCHED ALL OVER    Morphine Hives, Itching and Rash       Prior to Admission medications    Medication Sig Start Date End Date Taking? Authorizing Provider   acetaminophen (Tylenol) 500 mg tablet Take 2 tablets (1,000 mg) by mouth every 8 hours if needed for mild pain (1 - 3).   Yes Historical Provider, MD   amLODIPine (Norvasc) 5 mg tablet Take 1 tablet (5 mg) by mouth once daily. 10/4/24  Yes Historical Provider, MD   b complex vitamins capsule Take 1 capsule by mouth once daily.   Yes Historical Provider, MD   BERBERINE CHLORIDE ORAL Take 1,500 mg by mouth once daily.   Yes Historical Provider, MD   calcium carbonate (Tums) 200 mg calcium chewable tablet Chew 1 tablet (500 mg) 4 times a day as needed for indigestion or heartburn.   Yes Historical Provider, MD   calcium carbonate-vitamin D3 600 mg-5 mcg (200 unit) tablet Take 2 tablets by mouth once daily.   Yes Historical Provider, MD   cetirizine (ZyrTEC) 10 mg tablet Take 1 tablet (10 mg) by mouth once daily in the morning. Take before meals.   Yes Historical Provider, MD   garlic 1,000 mg capsule Take 1 capsule by mouth once daily.   Yes Historical Provider, MD   ibuprofen 800 mg tablet Take 1 tablet (800 mg) by mouth 3 times a day.   Yes Historical Provider, MD   irbesartan-hydrochlorothiazide (Avalide) 300-12.5 mg tablet Take 1 tablet by mouth once daily. 10/4/24 10/4/25 Yes Historical Provider, MD   krill oil 500 mg capsule Take 1 capsule (500 mg) by mouth once daily.   Yes Historical Provider,  MD   levothyroxine (Synthroid, Levoxyl) 125 mcg tablet Take 1 tablet (125 mcg) by mouth early in the morning.. 8/2/24  Yes Historical Provider, MD   multivitamin tablet Take 1 tablet by mouth once daily.   Yes Historical Provider, MD   NON FORMULARY Take 2 each by mouth once daily. MORINGA   Yes Historical Provider, MD   red yeast rice 600 mg capsule Take 2 capsules by mouth once daily.   Yes Historical Provider, MD   rOPINIRole (Requip) 0.5 mg tablet Take 1 tablet (0.5 mg) by mouth once daily at bedtime.   Yes Historical Provider, MD   chlorhexidine (Peridex) 0.12 % solution SWISH AND SPIT 15ML FOR 30 SECONDS NIGHT PRIOR TO SURGERY AND MORNING OF SURGERY 3/6/25   NIRMALA Morrison-DANISH        PAT ROS     DASI Risk Score      Flowsheet Row Questionnaire Series Submission from 1/6/2025 in [facility] with Generic Provider Edu   Can you take care of yourself (eat, dress, bathe, or use toilet)?  2.75  filed at 01/06/2025 1947   Can you walk indoors, such as around your house? 1.75  filed at 01/06/2025 1947   Can you walk a block or two on level ground?  2.75  filed at 01/06/2025 1947   Can you climb a flight of stairs or walk up a hill? 5.5  filed at 01/06/2025 1947   Can you run a short distance? 0  filed at 01/06/2025 1947   Can you do light work around the house like dusting or washing dishes? 2.7  filed at 01/06/2025 1947   Can you do moderate work around the house like vacuuming, sweeping floors or carrying groceries? 3.5  filed at 01/06/2025 1947   Can you do heavy work around the house like scrubbing floors or lifting and moving heavy furniture?  0  filed at 01/06/2025 1947   Can you do yard work like raking leaves, weeding or pushing a mower? 0  filed at 01/06/2025 1947   Can you have sexual relations? 5.25  filed at 01/06/2025 1947   Can you participate in moderate recreational activities like golf, bowling, dancing, doubles tennis or throwing a baseball or football? 0  filed at 01/06/2025 1947   Can you  participate in strenous sports like swimming, singles tennis, football, basketball, or skiing? 0  filed at 01/06/2025 1947   DASI SCORE 24.2  filed at 01/06/2025 1947   METS Score (Will be calculated only when all the questions are answered) 5.7  filed at 01/06/2025 1947          Caprini DVT Assessment      Flowsheet Row Admission (Discharged) from 11/18/2024 in [facility] Bldg A 7 with Michael Flores MD Admission (Discharged) from 10/30/2024 in [facility] OR with Michael Flores MD   DVT Score (IF A SCORE IS NOT CALCULATING, MUST SELECT A BMI TO COMPLETE) 6 filed at 11/18/2024 0727 8 filed at 10/30/2024 1057   Surgical Factors Major surgery planned, lasting 2-3 hours filed at 11/18/2024 0727 Major surgery planned, lasting 2-3 hours filed at 10/30/2024 1057   BMI (BMI MUST BE CHOSEN) 30 or less filed at 11/18/2024 0727 31-40 (Obesity) filed at 10/30/2024 1057          Modified Frailty Index    No data to display       [id number] Stroke Risk Points  Current as of just now        N/A 0 to 9 Points:      Last Change: N/A          The [id number] risk score (Lip SUSAN, et al. 2009. © 2010 [employer]) quantifies the risk of stroke for a patient with atrial fibrillation. For patients without atrial fibrillation or under the age of 18 this score appears as N/A. Higher score values generally indicate higher risk of stroke.        This score is not applicable to this patient. Components are not calculated.          Revised Cardiac Risk Index    No data to display       Apfel Simplified Score    No data to display       Risk Analysis Index Results This Encounter    No data found in the last 10 encounters.       Stop Bang Score      Flowsheet Row Questionnaire Series Submission from 1/6/2025 in [facility] with Generic Provider Edu   Do you snore loudly? 0  filed at 01/06/2025 1947   Do you often feel tired or fatigued after your sleep? 0  filed at 01/06/2025  1947   Has anyone ever observed you stop breathing in your sleep? 0  filed at 01/06/2025 1947   Do you have or are you being treated for high blood pressure? 1  filed at 01/06/2025 1947   Recent BMI (Calculated) 35.7  filed at 01/06/2025 1947   Is BMI greater than 35 kg/m2? 1=Yes  filed at 01/06/2025 1947   Age older than 50 years old? 1=Yes  filed at 01/06/2025 1947   Gender - Male 0=No  filed at 01/06/2025 1947          Prodigy: High Risk  Total Score: 8              Prodigy Age Score           ARISCAT Score for Postoperative Pulmonary Complications    No data to display       Cherry Perioperative Risk for Myocardial Infarction or Cardiac Arrest (SOUMYA)    No data to display         Nurse Plan of Action:     After Visit Summary (AVS) reviewed and patient verbalized good understanding of medications and NPO instructions.  Pre-op infection prevention measures:  CHG showers and mouthwash reviewed, understanding voiced.  CHG soap given and patient verbalized need to pick CHG mouthwash at their preferred local pharmacy.

## 2025-03-06 NOTE — PREPROCEDURE INSTRUCTIONS
Medication List            Accurate as of March 6, 2025 10:16 AM. Always use your most recent med list.                acetaminophen 500 mg tablet  Commonly known as: Tylenol  Medication Adjustments for Surgery: Take/Use as prescribed     amLODIPine 5 mg tablet  Commonly known as: Norvasc  Medication Adjustments for Surgery: Take/Use as prescribed     b complex vitamins capsule  Additional Medication Adjustments for Surgery: Take last dose 7 days before surgery     BERBERINE CHLORIDE ORAL  Additional Medication Adjustments for Surgery: Take last dose 7 days before surgery     calcium carbonate 200 mg calcium chewable tablet  Commonly known as: Tums  Additional Medication Adjustments for Surgery: Take last dose 7 days before surgery     calcium carbonate-vitamin D3 600 mg-5 mcg (200 unit) tablet  Additional Medication Adjustments for Surgery: Take last dose 7 days before surgery     cetirizine 10 mg tablet  Commonly known as: ZyrTEC  Medication Adjustments for Surgery: Do Not take on the morning of surgery     chlorhexidine 0.12 % solution  Commonly known as: Peridex  SWISH AND SPIT 15ML FOR 30 SECONDS NIGHT PRIOR TO SURGERY AND MORNING OF SURGERY  Medication Adjustments for Surgery: Take/Use as prescribed     garlic 1,000 mg capsule  Additional Medication Adjustments for Surgery: Take last dose 7 days before surgery     ibuprofen 800 mg tablet  Additional Medication Adjustments for Surgery: Take last dose 7 days before surgery     irbesartan-hydrochlorothiazide 300-12.5 mg tablet  Commonly known as: Avalide  Medication Adjustments for Surgery: Take last dose 1 day (24 hours) before surgery  Notes to patient: Do NOT take evening before surgery and do NOT take morning of surgery.     krill oil 500 mg capsule  Additional Medication Adjustments for Surgery: Take last dose 7 days before surgery     levothyroxine 125 mcg tablet  Commonly known as: Synthroid, Levoxyl  Medication Adjustments for Surgery: Take/Use as  prescribed     multivitamin tablet  Additional Medication Adjustments for Surgery: Take last dose 7 days before surgery     NON FORMULARY  Additional Medication Adjustments for Surgery: Take last dose 7 days before surgery     red yeast rice 600 mg capsule  Additional Medication Adjustments for Surgery: Take last dose 7 days before surgery     rOPINIRole 0.5 mg tablet  Commonly known as: Requip  Medication Adjustments for Surgery: Take/Use as prescribed                Preoperative Brain Exercises    What are brain exercises?  A brain exercise is any activity that engages your thinking (cognitive) skills.    What types of activities are considered brain exercises?  Jigsaw puzzles, crossword puzzles, word jumble, memory games, word search, and many more.  Many can be found free online or on your phone via a mobile manju.    Why should I do brain exercises before my surgery?  More recent research has shown brain exercise before surgery can lower the risk of postoperative delirium (confusion) which can be especially important for older adults.  Patients who did brain exercises for 5 to 10 hours the days before surgery, cut their risk of postoperative delirium in half up to 1 week after surgery.        Preoperative Deep Breathing Exercises  Why it is important to do deep breathing exercises before my surgery?  Deep breathing exercises strengthen your breathing muscles.  This helps you to recover after your surgery and decreases the chance of breathing complications.  How are the deep breathing exercises done?  Sit straight with your back supported.  Breathe in deeply and slowly through your nose. Your lower rib cage should expand and your abdomen may move forward.  Hold that breath for 3 to 5 seconds.  Breathe out through pursed lips, slowly and completely.  Rest and repeat 10 times every hour while awake.  Rest longer if you become dizzy or lightheaded.        CONTACT SURGEON'S OFFICE IF YOU DEVELOP:  * Fever = 100.4 F    * New respiratory symptoms (e.g. cough, shortness of breath, respiratory distress, sore throat)  * Recent loss of taste or smell  *Flu like symptoms such as headache, fatigue or gastrointestinal symptoms  * You develop any open sores, shingles, burning or painful urination   AND/OR:  * You no longer wish to have the surgery.  * Any other personal circumstances change that may lead to the need to cancel or defer this surgery.  *You were admitted to any hospital within one week of your planned procedure.    SMOKING:  *Quitting smoking can make a huge difference to your health and recovery from surgery.    *If you need help with quitting, call 6-131-QUIT-NOW.    THE DAY OF SURGERY:  *Do not eat any food after midnight the night before your surgery.   *YOU MUST drink 14 OUNCES of clear liquids TWO hours before your instructed ARRIVAL TIME to the hospital. This includes water, black tea/coffee (no milk or cream), apple juice, clear broth and electrolyte drinks (Gatorade).  Please avoid clear liquids that are red in color.   *You may chew gum/mints up to TWO hours before your surgery/procedure.    SURGICAL TIME:  *You will be contacted between 2 p.m. and 6 p.m. the business day before your surgery with your arrival time.  *If you haven't received a call by 6pm, call [phone].  *Scheduled surgery times may change and you will be notified if this occurs-check your personal voicemail for any updates.    ON THE MORNING OF SURGERY:  *Wear comfortable, loose fitting clothing.   *Do not use moisturizers, creams, lotions or perfume.  *All jewelry and valuables should be left at home.  *Prosthetic devices such as contact lenses, hearing aids, dentures, eyelash extensions, hairpins and body piercing must be removed before surgery.    BRING WITH YOU:  *Photo ID and insurance card  *Current list of medications and allergies  *Pacemaker/Defibrillator/Heart stent cards  *CPAP machine and mask  *Slings/splints/crutches  *Copy of  your complete Advanced Directive/DHPOA-if applicable  *Neurostimulator implant remote    PARKING AND ARRIVAL:  *Check in at the Main Entrance desk and let them know you are here for surgery.  *You will be directed to the 2nd floor surgical waiting area.    IF YOU ARE HAVING OUTPATIENT/SAME DAY SURGERY:  *A responsible adult MUST accompany you at the time of discharge and stay with you for 24 hours after your surgery.  *You may NOT drive yourself home after surgery.  *You may use a taxi or ride sharing service ([employer], Uber) to return home ONLY if you are accompanied by a friend or family member.  *Instructions for resuming your medications will be provided by your surgeon.        Patient Information: Oral/Dental Rinse  **This is a prescription; pick it up at your preferred local pharmacy **  What is oral/dental rinse?   It is a mouthwash. It is a way of cleaning the mouth with a germ killing solution before your surgery.  The solution contains chlorhexidine, commonly known as CHG.   It is used inside the mouth to kill a bacteria known as Staphylococcus aureus.  Let your doctor know if you are allergic to Chlorhexidine.    Why do I need to use CHG oral/dental rinse?  The CHG oral/dental rinse helps to kill a bacteria in your mouth known a Staphylococcus aureus.     This reduces the risk of infection at the surgical site.      Using your CHG oral/dental rinse  STEPS:  Use your CHG oral/dental rinse after you brush your teeth the night before (at bedtime) and the morning of your surgery.  Follow all directions on your prescription label.    Use the cap on the container to measure 15ml (fill cap to fill line)  Swish (gargle if you can) the mouthwash in your mouth for at least 30 seconds, (do not to swallow) spit out  After you use your CHG rinse, do not rinse your mouth with water, drink or eat.  Please refer to prescription label for the appropriate time to resume oral intake  Dental rinse comes in one size bottle: 473ml  ~16oz.  You will have leftover    rinse, discard after this use.    What side effects might I have using the CHG oral/dental rinse?  CHG rinse will stick to plaque on the teeth.  Brush and floss just before use.  Teeth brushing will help avoid staining of plaque during use.    Who should I contact if I have questions about the CHG oral/dental rinse?  Please call [facility], Preadmission Testing at [phone] if you have any questions      Home Preoperative Antibacterial Shower     What is a home preoperative antibacterial shower?  This shower is a way of cleaning the skin with a germ killing soap before surgery.  The soap contains chlorhexidine, commonly known as CHG.  CHG is a soap for your skin with germ killing ability.  Let your doctor know if you are allergic to chlorhexidine.    Why do I need to take a preoperative antibacterial shower?  Skin is not sterile.  It is best to try to make your skin as free of germs as possible before surgery.  Proper cleansing with a germ killing soap before surgery can lower the number of germs on your skin.  This helps to reduce the risk of infection at the surgical site.  Following the instructions listed below will help you prepare your skin for surgery.      How do I use the CHG skin cleanser?  Steps:  Begin using your CHG soap five days before your scheduled surgery on ________________________.    Days 1-4 Shower before bed:  Wash your face and genitals with your normal soap and rinse.           2.    Apply the CHG soap to a clean wet washcloth.  Turn the water off or move away                From the water spray to avoid premature rinsing of the CHG soap as you are applying.     3.   Lather your entire body from the neck down.  Do not use on your face or genitals.  4. Pay special attention to the area(s) where your incision(s) will be located unless they are on your face.  Avoid scrubbing your skin too hard.  The important point is to have  the CHG soap sit on your skin for 3 minutes.    When the 3 minutes are up, turn on the water and rinse the CHG soap off your body completely.   Pat yourself dry with a clean, freshly-laundered towel.  Dress in clean, freshly laundered night clothes.    Be sure to sleep with clean, freshly laundered sheets.  Day 5:  Last shower is the morning of surgery: Follow above Instructions.    NOTE:        *Keep CHG soap out of eyes and ear canals   *DO NOT wash with regular soap on your body after you have used the CHG soap solution  *DO NOT apply powders, lotions, or perfume.  *Deodorant may be used days 1-4, BUT NOT the day of surgery    Who should I contact if I have any questions regarding the use of CHG soap?  Call the [facility], Preadmission Testing at [phone] if you have any questions.              Patient Information: Pre-Operative Infection Prevention Measures     Why did I have my nose, under my arms and groin swabbed?  The purpose of the swab is to identify Staphylococcus aureus inside your nose or on your skin.  The swab was sent to the laboratory for culture.  A positive swab/culture for Staphylococcus aureus is called colonization or carriage.      What is Staphylococcus aureus?  Staphylococcus aureus, also known as “staph”, is a germ found on the skin or in the nose of healthy people.  Sometimes Staphylococcus aureus can get into the body and cause an infection.  This can be minor (such as pimples, boils or other skin problems).  It might also be serious (such as blood infection, pneumonia or a surgical site infection).    What is Staphylococcus aureus colonization or carriage?  Colonization or carriage means that a person has the germ but is not sick from it.  These bacteria can be spread on the hands or when breathing or sneezing.    How is Staphylococcus aureus spread?  It is most often spread by close contact with a person or item that carries it.    What happens if my  culture is positive for Staphylococcus aureus?  Your doctor/medical team will use this information to guide any antibiotic treatment which may be necessary.  Regardless of the culture results, we will clean the inside of your nose with a betadine swab just before you have your surgery.      Will I get an infection if I have Staphylococcus aureus in my nose or on my skin?  Anyone can get an infection with Staphylococcus aureus.  However, the best way to reduce your risk of infection is to follow the instructions provided to you for the use of your CHG soap and dental rinse.        Who should I contact if I have any questions?  Call the [facility], Preadmission Testing at [phone] if you have any questions.

## 2025-03-08 LAB — STAPHYLOCOCCUS SPEC CULT: NORMAL

## 2025-03-18 ENCOUNTER — ANESTHESIA EVENT (OUTPATIENT)
Dept: OPERATING ROOM | Facility: HOSPITAL | Age: 70
End: 2025-03-18
Payer: MEDICARE

## 2025-03-19 ENCOUNTER — APPOINTMENT (OUTPATIENT)
Dept: RADIOLOGY | Facility: HOSPITAL | Age: 70
End: 2025-03-19
Payer: MEDICARE

## 2025-03-19 ENCOUNTER — HOSPITAL ENCOUNTER (OUTPATIENT)
Facility: HOSPITAL | Age: 70
Discharge: HOME | End: 2025-03-21
Attending: ORTHOPAEDIC SURGERY | Admitting: ORTHOPAEDIC SURGERY
Payer: MEDICARE

## 2025-03-19 ENCOUNTER — ANESTHESIA (OUTPATIENT)
Dept: OPERATING ROOM | Facility: HOSPITAL | Age: 70
End: 2025-03-19
Payer: MEDICARE

## 2025-03-19 DIAGNOSIS — R29.810 FACIAL WEAKNESS: ICD-10-CM

## 2025-03-19 DIAGNOSIS — Z98.890 POST-OPERATIVE STATE: Primary | ICD-10-CM

## 2025-03-19 DIAGNOSIS — G89.18 ACUTE POST-OPERATIVE PAIN: ICD-10-CM

## 2025-03-19 DIAGNOSIS — M19.029 ELBOW ARTHRITIS: Primary | ICD-10-CM

## 2025-03-19 PROBLEM — E66.812 CLASS 2 OBESITY IN ADULT: Status: ACTIVE | Noted: 2025-03-19

## 2025-03-19 LAB — GLUCOSE BLD MANUAL STRIP-MCNC: 195 MG/DL (ref 74–99)

## 2025-03-19 PROCEDURE — 2500000004 HC RX 250 GENERAL PHARMACY W/ HCPCS (ALT 636 FOR OP/ED): Performed by: ANESTHESIOLOGY

## 2025-03-19 PROCEDURE — 7100000011 HC EXTENDED STAY RECOVERY HOURLY - NURSING UNIT

## 2025-03-19 PROCEDURE — 2500000001 HC RX 250 WO HCPCS SELF ADMINISTERED DRUGS (ALT 637 FOR MEDICARE OP): Performed by: NURSE PRACTITIONER

## 2025-03-19 PROCEDURE — 24363 REPLACE ELBOW JOINT: CPT | Performed by: NURSE PRACTITIONER

## 2025-03-19 PROCEDURE — 73070 X-RAY EXAM OF ELBOW: CPT | Mod: RIGHT SIDE | Performed by: RADIOLOGY

## 2025-03-19 PROCEDURE — 2500000005 HC RX 250 GENERAL PHARMACY W/O HCPCS: Performed by: ORTHOPAEDIC SURGERY

## 2025-03-19 PROCEDURE — 24363 REPLACE ELBOW JOINT: CPT | Performed by: ORTHOPAEDIC SURGERY

## 2025-03-19 PROCEDURE — 99291 CRITICAL CARE FIRST HOUR: CPT | Performed by: INTERNAL MEDICINE

## 2025-03-19 PROCEDURE — 70450 CT HEAD/BRAIN W/O DYE: CPT

## 2025-03-19 PROCEDURE — 2500000002 HC RX 250 W HCPCS SELF ADMINISTERED DRUGS (ALT 637 FOR MEDICARE OP, ALT 636 FOR OP/ED): Mod: MUE

## 2025-03-19 PROCEDURE — 3700000001 HC GENERAL ANESTHESIA TIME - INITIAL BASE CHARGE: Performed by: ORTHOPAEDIC SURGERY

## 2025-03-19 PROCEDURE — 2500000005 HC RX 250 GENERAL PHARMACY W/O HCPCS

## 2025-03-19 PROCEDURE — 2500000005 HC RX 250 GENERAL PHARMACY W/O HCPCS: Performed by: NURSE PRACTITIONER

## 2025-03-19 PROCEDURE — 2780000003 HC OR 278 NO HCPCS: Performed by: ORTHOPAEDIC SURGERY

## 2025-03-19 PROCEDURE — 2720000007 HC OR 272 NO HCPCS: Performed by: ORTHOPAEDIC SURGERY

## 2025-03-19 PROCEDURE — 2500000004 HC RX 250 GENERAL PHARMACY W/ HCPCS (ALT 636 FOR OP/ED): Performed by: NURSE PRACTITIONER

## 2025-03-19 PROCEDURE — 2500000005 HC RX 250 GENERAL PHARMACY W/O HCPCS: Performed by: ANESTHESIOLOGIST ASSISTANT

## 2025-03-19 PROCEDURE — 2500000001 HC RX 250 WO HCPCS SELF ADMINISTERED DRUGS (ALT 637 FOR MEDICARE OP): Performed by: INTERNAL MEDICINE

## 2025-03-19 PROCEDURE — 2500000005 HC RX 250 GENERAL PHARMACY W/O HCPCS: Performed by: ANESTHESIOLOGY

## 2025-03-19 PROCEDURE — C1713 ANCHOR/SCREW BN/BN,TIS/BN: HCPCS | Performed by: ORTHOPAEDIC SURGERY

## 2025-03-19 PROCEDURE — 3600000009 HC OR TIME - EACH INCREMENTAL 1 MINUTE - PROCEDURE LEVEL FOUR: Performed by: ORTHOPAEDIC SURGERY

## 2025-03-19 PROCEDURE — 7100000002 HC RECOVERY ROOM TIME - EACH INCREMENTAL 1 MINUTE: Performed by: ORTHOPAEDIC SURGERY

## 2025-03-19 PROCEDURE — 2500000004 HC RX 250 GENERAL PHARMACY W/ HCPCS (ALT 636 FOR OP/ED): Performed by: ANESTHESIOLOGIST ASSISTANT

## 2025-03-19 PROCEDURE — A4649 SURGICAL SUPPLIES: HCPCS | Performed by: ORTHOPAEDIC SURGERY

## 2025-03-19 PROCEDURE — C1776 JOINT DEVICE (IMPLANTABLE): HCPCS | Performed by: ORTHOPAEDIC SURGERY

## 2025-03-19 PROCEDURE — 2500000002 HC RX 250 W HCPCS SELF ADMINISTERED DRUGS (ALT 637 FOR MEDICARE OP, ALT 636 FOR OP/ED): Performed by: ANESTHESIOLOGY

## 2025-03-19 PROCEDURE — 3600000004 HC OR TIME - INITIAL BASE CHARGE - PROCEDURE LEVEL FOUR: Performed by: ORTHOPAEDIC SURGERY

## 2025-03-19 PROCEDURE — 2500000001 HC RX 250 WO HCPCS SELF ADMINISTERED DRUGS (ALT 637 FOR MEDICARE OP)

## 2025-03-19 PROCEDURE — A24363 PR ARTHROPLASTY,ELBOW,TOTAL PROSTH REPL: Performed by: ANESTHESIOLOGIST ASSISTANT

## 2025-03-19 PROCEDURE — 73070 X-RAY EXAM OF ELBOW: CPT | Mod: RT

## 2025-03-19 PROCEDURE — A24363 PR ARTHROPLASTY,ELBOW,TOTAL PROSTH REPL: Performed by: ANESTHESIOLOGY

## 2025-03-19 PROCEDURE — C1734 ORTH/DEVIC/DRUG BN/BN,TIS/BN: HCPCS | Performed by: ORTHOPAEDIC SURGERY

## 2025-03-19 PROCEDURE — 3700000002 HC GENERAL ANESTHESIA TIME - EACH INCREMENTAL 1 MINUTE: Performed by: ORTHOPAEDIC SURGERY

## 2025-03-19 PROCEDURE — 64415 NJX AA&/STRD BRCH PLXS IMG: CPT | Performed by: ANESTHESIOLOGY

## 2025-03-19 PROCEDURE — 82947 ASSAY GLUCOSE BLOOD QUANT: CPT

## 2025-03-19 PROCEDURE — 2500000004 HC RX 250 GENERAL PHARMACY W/ HCPCS (ALT 636 FOR OP/ED)

## 2025-03-19 PROCEDURE — 70450 CT HEAD/BRAIN W/O DYE: CPT | Performed by: STUDENT IN AN ORGANIZED HEALTH CARE EDUCATION/TRAINING PROGRAM

## 2025-03-19 PROCEDURE — 2500000004 HC RX 250 GENERAL PHARMACY W/ HCPCS (ALT 636 FOR OP/ED): Performed by: INTERNAL MEDICINE

## 2025-03-19 PROCEDURE — 7100000001 HC RECOVERY ROOM TIME - INITIAL BASE CHARGE: Performed by: ORTHOPAEDIC SURGERY

## 2025-03-19 DEVICE — IMPLANTABLE DEVICE: Type: IMPLANTABLE DEVICE | Site: ELBOW | Status: FUNCTIONAL

## 2025-03-19 DEVICE — IMPLANTABLE DEVICE
Type: IMPLANTABLE DEVICE | Site: ELBOW | Status: FUNCTIONAL
Brand: LATITUDE™ - ELBOW

## 2025-03-19 DEVICE — TOBRA FULL DOSE ANTIBIOTIC BONE CEMENT, 10 PACK CATALOG NUMBER IS 6197-9-010
Type: IMPLANTABLE DEVICE | Site: ELBOW | Status: FUNCTIONAL
Brand: SIMPLEX

## 2025-03-19 RX ORDER — DOCUSATE SODIUM 100 MG/1
100 CAPSULE, LIQUID FILLED ORAL 2 TIMES DAILY
Qty: 30 CAPSULE | Refills: 0 | OUTPATIENT
Start: 2025-03-19 | End: 2025-03-21 | Stop reason: HOSPADM

## 2025-03-19 RX ORDER — SODIUM CHLORIDE 0.9 G/100ML
INJECTION, SOLUTION IRRIGATION AS NEEDED
Status: DISCONTINUED | OUTPATIENT
Start: 2025-03-19 | End: 2025-03-19 | Stop reason: HOSPADM

## 2025-03-19 RX ORDER — WATER 1 ML/ML
INJECTION IRRIGATION AS NEEDED
Status: DISCONTINUED | OUTPATIENT
Start: 2025-03-19 | End: 2025-03-19 | Stop reason: HOSPADM

## 2025-03-19 RX ORDER — DOCUSATE SODIUM 100 MG/1
100 CAPSULE, LIQUID FILLED ORAL 2 TIMES DAILY
Status: DISCONTINUED | OUTPATIENT
Start: 2025-03-19 | End: 2025-03-21 | Stop reason: HOSPADM

## 2025-03-19 RX ORDER — FENTANYL CITRATE 50 UG/ML
INJECTION, SOLUTION INTRAMUSCULAR; INTRAVENOUS AS NEEDED
Status: DISCONTINUED | OUTPATIENT
Start: 2025-03-19 | End: 2025-03-19

## 2025-03-19 RX ORDER — SODIUM CHLORIDE, SODIUM LACTATE, POTASSIUM CHLORIDE, CALCIUM CHLORIDE 600; 310; 30; 20 MG/100ML; MG/100ML; MG/100ML; MG/100ML
50 INJECTION, SOLUTION INTRAVENOUS CONTINUOUS
Status: ACTIVE | OUTPATIENT
Start: 2025-03-19 | End: 2025-03-20

## 2025-03-19 RX ORDER — MIDAZOLAM HYDROCHLORIDE 1 MG/ML
INJECTION, SOLUTION INTRAMUSCULAR; INTRAVENOUS AS NEEDED
Status: DISCONTINUED | OUTPATIENT
Start: 2025-03-19 | End: 2025-03-19

## 2025-03-19 RX ORDER — SODIUM CHLORIDE, SODIUM LACTATE, POTASSIUM CHLORIDE, CALCIUM CHLORIDE 600; 310; 30; 20 MG/100ML; MG/100ML; MG/100ML; MG/100ML
INJECTION, SOLUTION INTRAVENOUS CONTINUOUS PRN
Status: DISCONTINUED | OUTPATIENT
Start: 2025-03-19 | End: 2025-03-19

## 2025-03-19 RX ORDER — TRANEXAMIC ACID 100 MG/ML
INJECTION, SOLUTION INTRAVENOUS AS NEEDED
Status: DISCONTINUED | OUTPATIENT
Start: 2025-03-19 | End: 2025-03-19

## 2025-03-19 RX ORDER — DEXAMETHASONE SODIUM PHOSPHATE 10 MG/ML
INJECTION INTRAMUSCULAR; INTRAVENOUS AS NEEDED
Status: DISCONTINUED | OUTPATIENT
Start: 2025-03-19 | End: 2025-03-19

## 2025-03-19 RX ORDER — ONDANSETRON HYDROCHLORIDE 2 MG/ML
4 INJECTION, SOLUTION INTRAVENOUS EVERY 8 HOURS PRN
Status: DISCONTINUED | OUTPATIENT
Start: 2025-03-19 | End: 2025-03-21 | Stop reason: HOSPADM

## 2025-03-19 RX ORDER — LEVOTHYROXINE SODIUM 125 UG/1
125 TABLET ORAL
Status: DISCONTINUED | OUTPATIENT
Start: 2025-03-19 | End: 2025-03-21 | Stop reason: HOSPADM

## 2025-03-19 RX ORDER — ROPINIROLE 0.25 MG/1
0.5 TABLET, FILM COATED ORAL NIGHTLY
Status: DISCONTINUED | OUTPATIENT
Start: 2025-03-19 | End: 2025-03-21 | Stop reason: HOSPADM

## 2025-03-19 RX ORDER — MEPERIDINE HYDROCHLORIDE 25 MG/ML
12.5 INJECTION INTRAMUSCULAR; INTRAVENOUS; SUBCUTANEOUS EVERY 10 MIN PRN
Status: DISCONTINUED | OUTPATIENT
Start: 2025-03-19 | End: 2025-03-19 | Stop reason: HOSPADM

## 2025-03-19 RX ORDER — CEFAZOLIN 1 G/1
INJECTION, POWDER, FOR SOLUTION INTRAVENOUS AS NEEDED
Status: DISCONTINUED | OUTPATIENT
Start: 2025-03-19 | End: 2025-03-19

## 2025-03-19 RX ORDER — ASPIRIN 81 MG/1
81 TABLET ORAL DAILY
Status: DISCONTINUED | OUTPATIENT
Start: 2025-03-20 | End: 2025-03-19 | Stop reason: SDUPTHER

## 2025-03-19 RX ORDER — AMLODIPINE BESYLATE 5 MG/1
5 TABLET ORAL DAILY
Status: DISCONTINUED | OUTPATIENT
Start: 2025-03-20 | End: 2025-03-19

## 2025-03-19 RX ORDER — APREPITANT 40 MG/1
CAPSULE ORAL AS NEEDED
Status: DISCONTINUED | OUTPATIENT
Start: 2025-03-19 | End: 2025-03-19

## 2025-03-19 RX ORDER — OXYCODONE HYDROCHLORIDE 5 MG/1
5 TABLET ORAL EVERY 6 HOURS PRN
Status: DISCONTINUED | OUTPATIENT
Start: 2025-03-19 | End: 2025-03-21 | Stop reason: HOSPADM

## 2025-03-19 RX ORDER — POLYETHYLENE GLYCOL 3350 17 G/17G
17 POWDER, FOR SOLUTION ORAL DAILY
Status: DISCONTINUED | OUTPATIENT
Start: 2025-03-19 | End: 2025-03-21 | Stop reason: HOSPADM

## 2025-03-19 RX ORDER — ADHESIVE BANDAGE
30 BANDAGE TOPICAL DAILY PRN
Status: DISCONTINUED | OUTPATIENT
Start: 2025-03-19 | End: 2025-03-21 | Stop reason: HOSPADM

## 2025-03-19 RX ORDER — LABETALOL HYDROCHLORIDE 5 MG/ML
10 INJECTION, SOLUTION INTRAVENOUS EVERY 10 MIN PRN
Status: DISCONTINUED | OUTPATIENT
Start: 2025-03-19 | End: 2025-03-21 | Stop reason: HOSPADM

## 2025-03-19 RX ORDER — NALOXONE HYDROCHLORIDE 1 MG/ML
0.2 INJECTION INTRAMUSCULAR; INTRAVENOUS; SUBCUTANEOUS EVERY 5 MIN PRN
Status: DISCONTINUED | OUTPATIENT
Start: 2025-03-19 | End: 2025-03-21 | Stop reason: HOSPADM

## 2025-03-19 RX ORDER — NORETHINDRONE AND ETHINYL ESTRADIOL 0.5-0.035
KIT ORAL AS NEEDED
Status: DISCONTINUED | OUTPATIENT
Start: 2025-03-19 | End: 2025-03-19

## 2025-03-19 RX ORDER — ONDANSETRON HYDROCHLORIDE 2 MG/ML
INJECTION, SOLUTION INTRAVENOUS AS NEEDED
Status: DISCONTINUED | OUTPATIENT
Start: 2025-03-19 | End: 2025-03-19

## 2025-03-19 RX ORDER — KETOROLAC TROMETHAMINE 10 MG/1
10 TABLET, FILM COATED ORAL EVERY 6 HOURS PRN
Qty: 12 TABLET | Refills: 0 | Status: SHIPPED | OUTPATIENT
Start: 2025-03-19 | End: 2025-03-19 | Stop reason: RX

## 2025-03-19 RX ORDER — VANCOMYCIN HYDROCHLORIDE 1 G/200ML
1 INJECTION, SOLUTION INTRAVENOUS EVERY 12 HOURS
Status: DISCONTINUED | OUTPATIENT
Start: 2025-03-19 | End: 2025-03-19 | Stop reason: SDUPTHER

## 2025-03-19 RX ORDER — OXYCODONE HYDROCHLORIDE 5 MG/1
5 TABLET ORAL EVERY 4 HOURS PRN
Qty: 36 TABLET | Refills: 0 | Status: SHIPPED | OUTPATIENT
Start: 2025-03-19 | End: 2025-03-27

## 2025-03-19 RX ORDER — OXYCODONE HYDROCHLORIDE 5 MG/1
2.5 TABLET ORAL EVERY 4 HOURS PRN
Status: DISCONTINUED | OUTPATIENT
Start: 2025-03-19 | End: 2025-03-21 | Stop reason: HOSPADM

## 2025-03-19 RX ORDER — LIDOCAINE HYDROCHLORIDE 10 MG/ML
0.1 INJECTION, SOLUTION EPIDURAL; INFILTRATION; INTRACAUDAL; PERINEURAL ONCE
Status: DISCONTINUED | OUTPATIENT
Start: 2025-03-19 | End: 2025-03-19 | Stop reason: HOSPADM

## 2025-03-19 RX ORDER — AMLODIPINE BESYLATE 5 MG/1
5 TABLET ORAL DAILY
Status: DISCONTINUED | OUTPATIENT
Start: 2025-03-20 | End: 2025-03-21 | Stop reason: HOSPADM

## 2025-03-19 RX ORDER — KETOROLAC TROMETHAMINE 30 MG/ML
15 INJECTION, SOLUTION INTRAMUSCULAR; INTRAVENOUS EVERY 6 HOURS
Status: CANCELLED | OUTPATIENT
Start: 2025-03-19 | End: 2025-03-20

## 2025-03-19 RX ORDER — BUPIVACAINE HCL/EPINEPHRINE 0.5-1:200K
VIAL (ML) INJECTION AS NEEDED
Status: DISCONTINUED | OUTPATIENT
Start: 2025-03-19 | End: 2025-03-19

## 2025-03-19 RX ORDER — PHENYLEPHRINE HCL IN 0.9% NACL 1 MG/10 ML
SYRINGE (ML) INTRAVENOUS AS NEEDED
Status: DISCONTINUED | OUTPATIENT
Start: 2025-03-19 | End: 2025-03-19

## 2025-03-19 RX ORDER — LIDOCAINE HYDROCHLORIDE 20 MG/ML
INJECTION, SOLUTION EPIDURAL; INFILTRATION; INTRACAUDAL; PERINEURAL AS NEEDED
Status: DISCONTINUED | OUTPATIENT
Start: 2025-03-19 | End: 2025-03-19

## 2025-03-19 RX ORDER — ONDANSETRON HYDROCHLORIDE 2 MG/ML
4 INJECTION, SOLUTION INTRAVENOUS ONCE AS NEEDED
Status: DISCONTINUED | OUTPATIENT
Start: 2025-03-19 | End: 2025-03-19 | Stop reason: HOSPADM

## 2025-03-19 RX ORDER — ACETAMINOPHEN 325 MG/1
650 TABLET ORAL EVERY 6 HOURS SCHEDULED
Status: DISCONTINUED | OUTPATIENT
Start: 2025-03-20 | End: 2025-03-21 | Stop reason: HOSPADM

## 2025-03-19 RX ORDER — PROPOFOL 10 MG/ML
INJECTION, EMULSION INTRAVENOUS AS NEEDED
Status: DISCONTINUED | OUTPATIENT
Start: 2025-03-19 | End: 2025-03-19

## 2025-03-19 RX ORDER — NAPROXEN SODIUM 220 MG/1
81 TABLET, FILM COATED ORAL DAILY
Status: DISCONTINUED | OUTPATIENT
Start: 2025-03-19 | End: 2025-03-21 | Stop reason: HOSPADM

## 2025-03-19 RX ORDER — IBUPROFEN 400 MG/1
800 TABLET ORAL EVERY 6 HOURS PRN
Status: DISCONTINUED | OUTPATIENT
Start: 2025-03-19 | End: 2025-03-21 | Stop reason: HOSPADM

## 2025-03-19 RX ORDER — ATORVASTATIN CALCIUM 40 MG/1
40 TABLET, FILM COATED ORAL NIGHTLY
Status: DISCONTINUED | OUTPATIENT
Start: 2025-03-19 | End: 2025-03-21 | Stop reason: HOSPADM

## 2025-03-19 RX ORDER — OMEPRAZOLE 40 MG/1
40 CAPSULE, DELAYED RELEASE ORAL
Qty: 3 CAPSULE | Refills: 0 | Status: SHIPPED | OUTPATIENT
Start: 2025-03-19 | End: 2025-03-22

## 2025-03-19 RX ORDER — OXYCODONE HYDROCHLORIDE 5 MG/1
5 TABLET ORAL EVERY 4 HOURS PRN
Status: DISCONTINUED | OUTPATIENT
Start: 2025-03-19 | End: 2025-03-19 | Stop reason: HOSPADM

## 2025-03-19 RX ORDER — ONDANSETRON 4 MG/1
4 TABLET, FILM COATED ORAL EVERY 8 HOURS PRN
Status: DISCONTINUED | OUTPATIENT
Start: 2025-03-19 | End: 2025-03-21 | Stop reason: HOSPADM

## 2025-03-19 RX ORDER — ASPIRIN 81 MG/1
81 TABLET ORAL DAILY
Qty: 14 TABLET | Refills: 0 | OUTPATIENT
Start: 2025-03-19 | End: 2025-03-21 | Stop reason: HOSPADM

## 2025-03-19 RX ORDER — ROCURONIUM BROMIDE 10 MG/ML
INJECTION, SOLUTION INTRAVENOUS AS NEEDED
Status: DISCONTINUED | OUTPATIENT
Start: 2025-03-19 | End: 2025-03-19

## 2025-03-19 RX ADMIN — Medication 2 L/MIN: at 18:43

## 2025-03-19 RX ADMIN — ASPIRIN 81 MG: 81 TABLET, CHEWABLE ORAL at 22:01

## 2025-03-19 RX ADMIN — AMLODIPINE BESYLATE 5 MG: 5 TABLET ORAL at 23:43

## 2025-03-19 RX ADMIN — CEFAZOLIN 2 G: 1 INJECTION, POWDER, FOR SOLUTION INTRAMUSCULAR; INTRAVENOUS at 14:05

## 2025-03-19 RX ADMIN — EPHEDRINE SULFATE 5 MG: 50 INJECTION, SOLUTION INTRAVENOUS at 15:34

## 2025-03-19 RX ADMIN — ACETAMINOPHEN 650 MG: 325 TABLET, FILM COATED ORAL at 23:44

## 2025-03-19 RX ADMIN — VANCOMYCIN HYDROCHLORIDE 1500 MG: 5 INJECTION, POWDER, LYOPHILIZED, FOR SOLUTION INTRAVENOUS at 22:17

## 2025-03-19 RX ADMIN — SODIUM CHLORIDE, POTASSIUM CHLORIDE, SODIUM LACTATE AND CALCIUM CHLORIDE: 600; 310; 30; 20 INJECTION, SOLUTION INTRAVENOUS at 17:08

## 2025-03-19 RX ADMIN — TRANEXAMIC ACID 1000 MG: 100 INJECTION INTRAVENOUS at 14:05

## 2025-03-19 RX ADMIN — Medication 200 MCG: at 14:20

## 2025-03-19 RX ADMIN — ROCURONIUM BROMIDE 20 MG: 10 INJECTION INTRAVENOUS at 16:00

## 2025-03-19 RX ADMIN — PROPOFOL 50 MG: 10 INJECTION, EMULSION INTRAVENOUS at 13:57

## 2025-03-19 RX ADMIN — ONDANSETRON 4 MG: 2 INJECTION, SOLUTION INTRAMUSCULAR; INTRAVENOUS at 17:51

## 2025-03-19 RX ADMIN — MIDAZOLAM HYDROCHLORIDE 2 MG: 1 INJECTION, SOLUTION INTRAMUSCULAR; INTRAVENOUS at 13:44

## 2025-03-19 RX ADMIN — LEVOTHYROXINE SODIUM 125 MCG: 0.12 TABLET ORAL at 23:43

## 2025-03-19 RX ADMIN — MIDAZOLAM HYDROCHLORIDE 3 MG: 1 INJECTION, SOLUTION INTRAMUSCULAR; INTRAVENOUS at 12:40

## 2025-03-19 RX ADMIN — SODIUM CHLORIDE, POTASSIUM CHLORIDE, SODIUM LACTATE AND CALCIUM CHLORIDE: 600; 310; 30; 20 INJECTION, SOLUTION INTRAVENOUS at 13:41

## 2025-03-19 RX ADMIN — PROPOFOL 65 MCG/KG/MIN: 10 INJECTION, EMULSION INTRAVENOUS at 15:09

## 2025-03-19 RX ADMIN — CARBOXYMETHYLCELLULOSE SODIUM 2 DROP: 5 SOLUTION/ DROPS OPHTHALMIC at 13:53

## 2025-03-19 RX ADMIN — ROPINIROLE HYDROCHLORIDE 0.5 MG: 0.25 TABLET, FILM COATED ORAL at 23:43

## 2025-03-19 RX ADMIN — Medication 20 ML: at 12:40

## 2025-03-19 RX ADMIN — DEXAMETHASONE SODIUM PHOSPHATE 8 MG: 4 INJECTION, SOLUTION INTRAMUSCULAR; INTRAVENOUS at 14:01

## 2025-03-19 RX ADMIN — Medication 200 MCG: at 15:03

## 2025-03-19 RX ADMIN — DOCUSATE SODIUM 100 MG: 100 CAPSULE, LIQUID FILLED ORAL at 22:03

## 2025-03-19 RX ADMIN — APREPITANT 40 MG: 40 CAPSULE ORAL at 12:30

## 2025-03-19 RX ADMIN — SODIUM CHLORIDE, POTASSIUM CHLORIDE, SODIUM LACTATE AND CALCIUM CHLORIDE 50 ML/HR: 600; 310; 30; 20 INJECTION, SOLUTION INTRAVENOUS at 21:50

## 2025-03-19 RX ADMIN — ROCURONIUM BROMIDE 20 MG: 10 INJECTION INTRAVENOUS at 16:42

## 2025-03-19 RX ADMIN — Medication 2 L/MIN: at 20:00

## 2025-03-19 RX ADMIN — EPHEDRINE SULFATE 5 MG: 50 INJECTION, SOLUTION INTRAVENOUS at 17:14

## 2025-03-19 RX ADMIN — FENTANYL CITRATE 50 MCG: 50 INJECTION, SOLUTION INTRAMUSCULAR; INTRAVENOUS at 17:53

## 2025-03-19 RX ADMIN — POLYETHYLENE GLYCOL 3350 17 G: 17 POWDER, FOR SOLUTION ORAL at 23:44

## 2025-03-19 RX ADMIN — EPHEDRINE SULFATE 5 MG: 50 INJECTION, SOLUTION INTRAVENOUS at 16:19

## 2025-03-19 RX ADMIN — EPHEDRINE SULFATE 5 MG: 50 INJECTION, SOLUTION INTRAVENOUS at 15:42

## 2025-03-19 RX ADMIN — Medication 6 L/MIN: at 18:26

## 2025-03-19 RX ADMIN — CEFAZOLIN 2 G: 1 INJECTION, POWDER, FOR SOLUTION INTRAMUSCULAR; INTRAVENOUS at 17:57

## 2025-03-19 RX ADMIN — Medication 200 MCG: at 14:39

## 2025-03-19 RX ADMIN — LIDOCAINE HYDROCHLORIDE 50 MG: 20 INJECTION, SOLUTION EPIDURAL; INFILTRATION; INTRACAUDAL; PERINEURAL at 13:53

## 2025-03-19 RX ADMIN — PROPOFOL 150 MG: 10 INJECTION, EMULSION INTRAVENOUS at 13:53

## 2025-03-19 RX ADMIN — ROCURONIUM BROMIDE 80 MG: 10 INJECTION INTRAVENOUS at 13:53

## 2025-03-19 RX ADMIN — EPHEDRINE SULFATE 5 MG: 50 INJECTION, SOLUTION INTRAVENOUS at 16:36

## 2025-03-19 RX ADMIN — POVIDONE-IODINE 1 APPLICATION: 5 SOLUTION TOPICAL at 12:05

## 2025-03-19 RX ADMIN — FENTANYL CITRATE 50 MCG: 50 INJECTION, SOLUTION INTRAMUSCULAR; INTRAVENOUS at 13:53

## 2025-03-19 SDOH — HEALTH STABILITY: MENTAL HEALTH: CURRENT SMOKER: 0

## 2025-03-19 ASSESSMENT — PAIN SCALES - GENERAL
PAINLEVEL_OUTOF10: 0 - NO PAIN
PAINLEVEL_OUTOF10: 1
PAINLEVEL_OUTOF10: 0 - NO PAIN
PAIN_LEVEL: 0
PAINLEVEL_OUTOF10: 0 - NO PAIN
PAINLEVEL_OUTOF10: 4

## 2025-03-19 ASSESSMENT — PAIN - FUNCTIONAL ASSESSMENT
PAIN_FUNCTIONAL_ASSESSMENT: 0-10
PAIN_FUNCTIONAL_ASSESSMENT: 0-10
PAIN_FUNCTIONAL_ASSESSMENT: UNABLE TO SELF-REPORT
PAIN_FUNCTIONAL_ASSESSMENT: 0-10
PAIN_FUNCTIONAL_ASSESSMENT: UNABLE TO SELF-REPORT
PAIN_FUNCTIONAL_ASSESSMENT: 0-10
PAIN_FUNCTIONAL_ASSESSMENT: UNABLE TO SELF-REPORT

## 2025-03-19 ASSESSMENT — COLUMBIA-SUICIDE SEVERITY RATING SCALE - C-SSRS
2. HAVE YOU ACTUALLY HAD ANY THOUGHTS OF KILLING YOURSELF?: NO
1. IN THE PAST MONTH, HAVE YOU WISHED YOU WERE DEAD OR WISHED YOU COULD GO TO SLEEP AND NOT WAKE UP?: NO
6. HAVE YOU EVER DONE ANYTHING, STARTED TO DO ANYTHING, OR PREPARED TO DO ANYTHING TO END YOUR LIFE?: NO

## 2025-03-19 ASSESSMENT — PAIN DESCRIPTION - LOCATION: LOCATION: ELBOW

## 2025-03-19 ASSESSMENT — PAIN DESCRIPTION - ORIENTATION: ORIENTATION: RIGHT

## 2025-03-19 NOTE — ANESTHESIA PROCEDURE NOTES
Peripheral Block    Patient location during procedure: pre-op  Start time: 3/19/2025 12:40 PM  End time: 3/19/2025 12:45 PM  Reason for block: at surgeon's request and post-op pain management  Staffing  Performed: attending   Authorized by: Trina Boggs MD    Performed by: Trina Boggs MD  Preanesthetic Checklist  Completed: patient identified, IV checked, site marked, risks and benefits discussed, surgical consent, monitors and equipment checked, pre-op evaluation and timeout performed   Timeout performed at: 3/19/2025 12:38 PM  Peripheral Block  Patient position: sitting  Prep: ChloraPrep and site prepped and draped  Patient monitoring: heart rate, cardiac monitor and continuous pulse ox  Block type: brachial plexus and supraclavicular  Laterality: right  Injection technique: single-shot  Guidance: nerve stimulator, ultrasound guided and ultrasound image saved to chart.  Local infiltration: lidocaine  Infiltration strength: 1 %  Dose: 2 mL  Needle  Needle type: short-bevel   Needle gauge: 22 G  Needle length: 5 cm  Needle localization: anatomical landmarks, nerve stimulator and ultrasound guidance  Test dose: negative  Assessment  Injection assessment: negative aspiration for heme, no paresthesia on injection, incremental injection and local visualized surrounding nerve on ultrasound  Paresthesia pain: none  Heart rate change: no  Slow fractionated injection: yes  Additional Notes  Prior to Procedure:  Focussed neurological history elicited. Patient related and procedure specific risks discussed including, but not limited to: bleeding, infection, nerve injury, injury to surrounding structures, prolonged numbness or weakness, local anesthetic toxicity and increased pain after block resolution.Verbal consent obtained from patient and/or surrogate decision maker. Anticoagulation (if any) was held per PETER guidelines.    Pre-medication with Versed 3 mg intravenously.  No Evoked Motor Response was visible at < 0.3  mA. Using hydrodissection with a blunt-tip, echogenic needle, the block was performed under dynamic ultrasound guidance. With in-plane needle visualization, 20 ml of 0.5% Bupivacaine with epi 5 mcg/ml, precedex 20 mcg and decadron 4 mg was injected extraneurally in 5 ml increments. Care was taken to avoid intraneural injection or expansion of intraneural tissue. There was no resistance to injection and appropriate injection pressures were maintained based on tactile feedback. Throughout the procedure, there was consistent and meaningful verbal communication with the patient. Post procedure vital signs were stable and no immediate complications were noted. PACU will provide written instructions that outline the specific precautions to be taken when caring for an akinetic, insensate extremity.

## 2025-03-19 NOTE — ANESTHESIA PREPROCEDURE EVALUATION
"Patient: Florina Doll \"Dionne\"    Procedure Information       Anesthesia Start Date/Time: 03/19/25 1342    Procedure: Right Elbow Total Arthroplasty (Right: Elbow)    Location: [facility] OR 11 / Virtual [facility] OR    Surgeons: Michael Flores MD                                                         Pre-Anesthesia Evaluation                                         Florina Doll \"Dionne\" is a 70 y.o. 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]    Past Medical History:   Diagnosis Date    Arthritis of elbow, left     s/p Left Elbow Total Arthroplasty 11/18/2024    Arthritis of elbow, right     Plan: Right Elbow Total Arthroplasty 3/19/2025    Cleft lip and cleft palate ([facility])     Diverticulosis     DVT (deep venous thrombosis) (Multi)     Right poipliteal, after TKA    Hearing aid worn     Right ear only    Hyperlipidemia     Hypertension     Hypothyroidism     Obesity     PONV (postoperative nausea and vomiting)     Primary osteoarthritis, right ankle and foot     s/p ARTHRODESIS MIDTARSAL OR TARSOMETATARSAL, MULTIPLE OR TRANSVERSE   Right 8/18/23    RLS (restless legs syndrome)      Past Surgical History:   Procedure Laterality Date    ADENOIDECTOMY      ANKLE FRACTURE SURGERY  1994    APPENDECTOMY  1990    CHOLECYSTECTOMY  1999    COLON SURGERY  1990    resection for diverticulitis    COLONOSCOPY      DILATION AND CURETTAGE OF UTERUS      1980, 1986 lysis of adhesions    ELBOW ARTHROPLASTY Left 11/18/2024    ELBOW FUSION Left 1978    FOOT FUSION Right 08/2023    ARTHRODESIS MIDTARSAL OR TARSOMETATARSAL, MULTIPLE OR TRANSVERSE    FOOT FUSION Right 12/2023    REVISION    FOOT SURGERY Bilateral     spherical implants    HARDWARE REMOVAL FOOT / ANKLE Left 1995    HARDWARE REMOVAL FOOT / ANKLE  12/12/2023    REMOVAL BURIED HARDWAR    LASIK Bilateral 1999    MOUTH SURGERY      multiple surgeries on cleft lip and palate    ORIF ANKLE FRACTURE Left 1994    TONSILLECTOMY      " TOTAL KNEE ARTHROPLASTY Right 2019    TOTAL KNEE ARTHROPLASTY Left     TYMPANOSTOMY TUBE PLACEMENT      WRIST SURGERY Right 2005     Social History   She reports that she has never smoked. She has never used smokeless tobacco. She reports that she does not drink alcohol and does not use drugs.    Allergies and Medications   Allergies   Allergen Reactions    Clarithromycin Swelling    Sulfa (Sulfonamide Antibiotics) Itching and Rash    Topiramate Swelling, Rash and Other     Hands and feet go numb    Cefazolin Rash    Iodinated Contrast Media Itching and Rash     ITCHED ALL OVER    Morphine Hives, Itching and Rash       Administrations This Visit       povidone-iodine 5 % kit kit       Admin Date  03/19/2025 Action  Given Dose  1 Application Route  Topical Documented By  Ulisses Scott, NICOLE                   Current Outpatient Medications   Medication Instructions    acetaminophen (TYLENOL) 1,000 mg, Every 8 hours PRN    amLODIPine (NORVASC) 5 mg, Daily    b complex vitamins capsule 1 capsule, Daily    BERBERINE CHLORIDE ORAL 1,500 mg, Daily    calcium carbonate (Tums) 200 mg calcium chewable tablet 1 tablet, 4 times daily PRN    calcium carbonate-vitamin D3 600 mg-5 mcg (200 unit) tablet 2 tablets, Daily    cetirizine (ZYRTEC) 10 mg, Daily before breakfast    chlorhexidine (Peridex) 0.12 % solution SWISH AND SPIT 15ML FOR 30 SECONDS NIGHT PRIOR TO SURGERY AND MORNING OF SURGERY    garlic 1,000 mg capsule 1 capsule, Daily    ibuprofen 800 mg, 3 times daily    irbesartan-hydrochlorothiazide (Avalide) 300-12.5 mg tablet 1 tablet, Daily RT    krill oil 500 mg capsule 1 capsule, Daily    levothyroxine (Synthroid, Levoxyl) 125 mcg tablet 1 tablet, Daily (0630)    multivitamin tablet 1 tablet, Daily    NON FORMULARY 2 each, Daily    red yeast rice 600 mg capsule 2 capsules, Daily    rOPINIRole (REQUIP) 0.5 mg, Nightly     Recent Labs     03/06/25  1049 11/19/24  0626   WBC 5.8 13.9*   HGB 15.3 14.0   HCT 45.1 42.3  "   191   MCV 90 90     Recent Labs     03/06/25  1049 11/19/24  0626   EGFR >90 >90   ANIONGAP 11 13   BUN 19 19   CREATININE 0.69 0.72    137   K 4.4 4.2    103   CO2 31 25   GLUCOSE 117* 151*     Recent Labs     03/06/25  1049 11/19/24  0626 10/25/24  1110   HGBA1C  --   --  6.0*   CALCIUM 9.9 9.5  --        Lab Results   Component Value Date    STAPHMRSASCR No Staphylococcus aureus isolated 03/06/2025     EKG   Encounter Date: 03/06/25   ECG 12 Lead   Result Value    Ventricular Rate 66    Atrial Rate 66    MI Interval 132    QRS Duration 120    QT Interval 414    QTC Calculation(Bazett) 434    P Axis 16    R Axis 9    T Axis -4    QRS Count 10    Q Onset 224    P Onset 158    P Offset 205    T Offset 431    QTC Fredericia 427    Narrative    Normal sinus rhythm with sinus arrhythmia  Right bundle branch block  Abnormal ECG  When compared with ECG of 25-OCT-2024 10:38,  T wave inversion now evident in Anterior leads  Confirmed by Wesley La ([id number]) on 3/6/2025 12:53:44 PM       Visit Vitals  /51   Pulse 96   Temp 37.6 °C (99.7 °F) (Temporal)   Resp 16   Ht 1.549 m (5' 1\")   Wt 86.3 kg (190 lb 4.1 oz)   SpO2 98%   BMI 35.95 kg/m²   OB Status Postmenopausal   Smoking Status Never   BSA 1.93 m²     Medical Gas Therapy: None (Room air)         Relevant Problems   Anesthesia   (+) PONV (postoperative nausea and vomiting)      Cardiac   (+) Hypertension      Endocrine   (+) Class 2 obesity in adult   (+) Hypothyroidism   (+) Prediabetes      Hematology   (+) DVT (deep venous thrombosis) (Multi)       Clinical information reviewed:   Tobacco  Allergies  Meds   Med Hx  Surg Hx   Fam Hx  Soc Hx        NPO Detail:  NPO/Void Status  Carbohydrate Drink Given Prior to Surgery? : N  Date of Last Liquid: 03/19/25  Time of Last Liquid: 0930  Date of Last Solid: 03/18/25  Time of Last Solid: 2230  Last Intake Type: Clear fluids  Time of Last Void: 0930         Physical Exam    Airway  Mallampati: " III  TM distance: >3 FB  Neck ROM: full     Cardiovascular   Rhythm: regular  Rate: normal     Dental - normal exam     Pulmonary   Comments: Normal RR  Non-labored respiration    Abdominal   (+) obese             Anesthesia Plan    History of general anesthesia?: yes  History of complications of general anesthesia?: yes    ASA 2     general   (GETA with standard ASA monitoring.Nerve block requested by surgeon for post-op analgesia; associated risks and alternative analgesic options discussed. )  The patient is not a current smoker.    intravenous induction   Anesthetic plan and risks discussed with patient.    Plan discussed with CAA and CRNA.

## 2025-03-19 NOTE — ANESTHESIA POSTPROCEDURE EVALUATION
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    Anesthesia Post Evaluation    Patient location during evaluation: PACU  Patient participation: complete - patient participated  Level of consciousness: awake  Pain score: 0  Pain management: adequate  Airway patency: patent  Cardiovascular status: acceptable  Respiratory status: acceptable  Hydration status: acceptable  Postoperative Nausea and Vomiting: none        No notable events documented.    "

## 2025-03-19 NOTE — PROGRESS NOTES
Rapid Response Note:       Patient is s/p Right elbow total arthroplasty, who underwent right subclavian nerve block pre-op. Last known well at ~1300. Upon awakening post-op at approximately 1900 a right facial droop was noted prompting a stroke alert.         NIHSS    1a  Level of consciousness: 0=alert; keenly responsive   1b. LOC questions:  0=Performs both tasks correctly   1c. LOC commands: 0=Performs both tasks correctly   2.  Best Gaze: 0=normal   3. Visual: 0=No visual loss   4. Facial Palsy: 2=Partial paralysis (total or near total paralysis of the lower face)   5a. Motor Left Arm: 0=No drift, limb holds 90 (or 45) degrees for full 10 seconds   5b.  Motor Right Arm: Not able to test due to Right subclavian block   6a. Motor Left Le=No drift, limb holds 90 (or 45) degrees for full 10 seconds   6b  Motor Right Le=No drift, limb holds 90 (or 45) degrees for full 10 seconds   7. Limb Ataxia: 0=Absent   8.  Sensory: 0=Normal; no sensory loss   9. Best Language:  0=No aphasia, normal   10. Dysarthria: 0=Normal   11. Extinction and Inattention: 0=No abnormality          Total:   2     Patient is able to raise both eyebrows. There is no Anisocoria.   Impression: suspect Transient right facial palsy d/t subclavian block. STAT non-contrast CT head shows no evidence of new bleed or acute infarct. Spoke with Stroke Neurology at [facility] [place]. In agreement that this patient is not a candidate for TNK as she is outside the window for TNK and d/t low NIH score in the context of distinct likelihood of alternative cause for right facial droop.      Plan/recommendations:   No TNK  Add Tele monitoring + Stroke order set w/out TNK to admission orders   High intensity statin + ASA  Discontinue NSAID d/t elevated stroke risk associated  MRI of brain in AM   Consider Neurology and/or Hospitalist consultation       Critical Care time: 50 min     S

## 2025-03-19 NOTE — SIGNIFICANT EVENT
Contacted by RN at 7:13 for patient with new right sided facial droop. I came back to the hospitals to assess. I assessed her at 7:26 Patient has right sided facial droop including her right cheek and lip with slurred speech first noted at 7pm. She has full ability to raise her eyebrows, her pupils are ERRLA, she is awake and alert and oriented x3. However, due to the concern for a possible CVA, we called a stroke alert. Discussed with patient.     Returned to PACU from CT scan 7:55. No acute blood or obvious intracranial process, by my read. Awaiting radiologist read.     7:59pm: Aldo Manley- radiology called no stroke on CT.

## 2025-03-19 NOTE — PERIOPERATIVE NURSING NOTE
"Patient name & assigned room #728 Florina \"Dionne\" Lavon  Procedure: right elbow total arthoplasty  Anesthesia type (i.e. general, regional, MAC): general  Nerve block (if applicable): brachial and supraclavicular   Estimated blood loss (EBL) in OR: 15 ml  Relevant PMH: PONV, HTN, dental disease, hearing aids, hypothyroidism, parathyroid disease, obesity, diverticulosis  Orientation/mental status: x4  Incision site(s)/location, surgical dressing(s), and drain type/location: R elbow splint; derma bond, fluffs, ABD, Webril, ace & hemovac  Fluids given in OR/PACU, IV site(s), and drips/fluids currently infusinG L hand 100 ml/hr LR   PO status (last oral intake): tolerating sips of water  Last set of vital signs & O2 requirements: 2L NC 94  Current pain level & last pain/nausea medication dose/time given: 0/10 block intact (emend given preop for nausea @1230)    antibiotic given @1800 in OR  Last tranexamic acid dose given @1405 (no redose at this time)  Last void/Mora in place: void by [id number]  Equipment sent with patient (i.e. Polar Cube, TENs unit, walker, crutches): none  SCDs on (yes/no): yes  Mode of transport (cart or bed): cart   Belongings sent with patient: 2 belonging bags  Emergency contact (name, relationship, phone number): Atilio () [phone]  PACU RN name and call back number: Natalie dai"

## 2025-03-19 NOTE — ANESTHESIA PROCEDURE NOTES
Airway  Date/Time: 3/19/2025 1:54 PM  Urgency: elective    Airway not difficult    Staffing  Performed: ANDREINA   Authorized by: Trina Boggs MD    Performed by: ANDREINA Sabillon  Patient location during procedure: OR    Indications and Patient Condition  Indications for airway management: anesthesia  Spontaneous Ventilation: absent  Sedation level: deep  Preoxygenated: yes  Patient position: sniffing  MILS maintained throughout  Mask difficulty assessment: 1 - vent by mask    Final Airway Details  Final airway type: endotracheal airway      Successful airway: ETT  Cuffed: yes   Successful intubation technique: direct laryngoscopy  Blade: Alka  Blade size: #3  ETT size (mm): 7.0  Cormack-Lehane Classification: grade IIb - view of arytenoids or posterior of glottis only  Placement verified by: chest auscultation and capnometry   Cuff volume (mL): 8  Measured from: lips  ETT to lips (cm): 20  Number of attempts at approach: 1

## 2025-03-19 NOTE — OP NOTE
"Right Elbow Total Arthroplasty (R) Operative Note     Date: 3/19/2025  OR Location: [facility] A OR    Name: Florina Doll \"Dionne\", : 1955, Age: 70 y.o., MRN: [id number], Sex: female    Diagnosis  Pre-op Diagnosis      * Elbow arthritis [M19.029] Post-op Diagnosis     * Elbow arthritis [M19.029]     Procedures  Right Elbow Total Arthroplasty  54699 - LA ARTHRP ELBOW W/DISTAL HUM&PROX UR PROSTC [facility]      Surgeons      * Michael Flores - Primary    Resident/Fellow/Other Assistant:  Surgeons and Role:     * Karri Whitten MD - Resident - Assisting    Staff:   Circulator: Bárbara  Scrub Person: Betty Siddiqi Scrub: Brandon Siddiqi Circulator: Zully    Anesthesia Staff: Anesthesiologist: Trina Boggs MD; Amanuel Ferreira MD  CRNA: NIRMALA Herrera-CRNA  C-AA: ANDREINA Moe; ANDREINA Sabillon    Procedure Summary  Anesthesia: General  ASA: II  Estimated Blood Loss: 50mL  Intra-op Medications:   Administrations occurring from 1330 to 1600 on 25:   Medication Name Total Dose   sterile water irrigation solution 1,000 mL   sodium chloride 0.9 % irrigation solution 2,000 mL   ceFAZolin (Ancef) vial 1 g 2 g   dexAMETHasone (Decadron) injection 4 mg/mL 8 mg   dexmedeTOMIDine (Precedex) bolus from bag 20 mcg   ePHEDrine injection 10 mg   fentaNYL (Sublimaze) injection 50 mcg/mL 50 mcg   lactated Ringer's infusion Cannot be calculated   lidocaine PF (Xylocaine-MPF) local injection 2 % 50 mg   lubricating eye drops ophthalmic solution 2 drop   midazolam (Versed) 1 mg/1 mL 2 mg   phenylephrine 100 mcg/mL syringe 10 mL (prefilled) 600 mcg   propofol (Diprivan) injection 10 mg/mL 455.02 mg   rocuronium (ZeMuron) 50 mg/5 mL injection 100 mg   tranexamic acid (Cyklokapron) injection 1,000 mg              Anesthesia Record               Intraprocedure I/O Totals          Intake    Dexmedetomidine 0.00 mL    The total shown is the total volume documented since Anesthesia Start was filed.    Tranexamic Acid 0.00 mL " "   The total shown is the total volume documented since Anesthesia Start was filed.    lactated Ringer's 1000.00 mL    Total Intake 1000 mL       Output    Urine 0 mL    Est. Blood Loss 30 mL    Total Output 30 mL       Net    Net Volume 970 mL          Specimen: No specimens collected              Drains and/or Catheters:   Closed/Suction Drain 1 Right;Ventral Elbow Accordion 10 Fr. (Active)   Dressing Status Clean;Dry 03/19/25 1826   Drainage Appearance Serosanguineous 03/19/25 1826   Status To bulb suction 03/19/25 1826       Tourniquet Times:     Total Tourniquet Time Documented:  Arm - Upper (Right) - 143 minutes  Total: Arm - Upper (Right) - 143 minutes      Implants:  Implants       Type Name Action Serial No.      Orthopedic Implant Humeral Stem COATED SIZE: SMALL, SIDE: RIGHT, L: 150MM Implanted [id number]     Screw PLUG, CEMENT, CLEARCUT, 8MM - SN/A - [id number] Implanted N/A     Implant CEMENT, BONE, TOBRAMYCIN, FULL DOSE - SN/A - [id number] Implanted N/A     Orthopedic Implant ulnar stem coated size: small, side:right, L: 41mm Implanted [id number]     Joint SPOOL, SMALL CENTERED OFFSET RIGHT - [id number] - [id number] Implanted [id number]     Joint ULNAR CAP, SMALL - [id number] - [id number] Implanted [id number]              Findings: Consistent with diagnosis patient had end-stage arthritis of her elbow.    Indications: Florina Doll \"Dionne\" is an 70 y.o. 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  Ulnar nerve symptoms    The patient was seen in the preoperative area. The risks, benefits, complications, treatment options, non-operative alternatives, expected recovery and outcomes were discussed with the patient. The possibilities of reaction to medication, pulmonary aspiration, injury to surrounding structures, bleeding, recurrent infection, the need for additional procedures, failure to diagnose a condition, and creating a complication requiring transfusion or operation were " discussed with the patient. The patient concurred with the proposed plan, giving informed consent.  The site of surgery was properly noted/marked if necessary per policy. The patient has been actively warmed in preoperative area. Preoperative antibiotics have been ordered and given within 1 hours of incision. Venous thrombosis prophylaxis have been ordered including bilateral sequential compression devices    Procedure Details: A pleasant patient, who failed conservative management including  injections, anti-inflammatories, and some therapy.  Risks, benefits,  and alternatives of surgery including risk of infection, bleeding,  nerve injury, and no improvement in pain were all discussed and she  wished to proceed.     DESCRIPTION OF OPERATION:    On the day of surgery, they were seen in the preop holding area.  The  elbow was identified and marked as the correct operative site, all  were in agreement with this elbow.  Consent was signed.  Elbow was  marked.  Nerve block was performed.  they were taken to the operating  room on [facility].  Huddle ensued ensuring she was the  correct patient and elbow was marked as the correct operative site.   Following this, we then intubated and sedated the patient.  We placed  her in a sloppy lateral supine position.  This allowed the arm to be  over top of the body.  sterile tourniquet was placed.  they were  prepped and draped in standard fashion.  Esmarch was used to  exsanguinate the arm to 225 mmHg, total time was 128 minutes.   Prepped and draped in standard fashion.  After this has been  performed, we then made a standard posterior utilitarian incision.   We took these down, soft tissue flaps were made medially and  laterally.  The ulnar nerve was identified and a vessel loop was  placed around it.  We opened up the common flexor tendons in order to  free up the nerve as much as we could.  It was placed in the  subcutaneous space during the portion of the  case and protected  throughout.  We then did a tongue-type approach to the elbow joint.   A small flap of tissue was left on the olecranon and then 4 to 5 cm  proximal tongue was used.  This was then flipped up in its entirety  distally in order to expose the elbow joint.  We then released the  soft tissue from the epicondyles, condyles as well as the ulna.  This  nicely exposed the elbow joint.  We turned our attention to the  humerus.  We prepared in standard fashion a small spool, seemed to  be quite good.  Oscillating saw was used as well as a bur.  Following  this, we turned our attention to the ulna.  We downsized the spool  for the .  We did this in standard fashion protecting  the nerve.  Once that was performed, we preserved the radial head.   We then used a bur to bur out the ulna for preparation of the  component.  With a canal finder, prepared in standard fashion,  Standard small length ulnar component.  We trialed and we were able  to get to full extension and I was very happy with this.  We  copiously irrigated the wound.  We used methylene blue cement and  pressurized in standard fashion both the ulna and the humerus.  We  placed our implants, interlocking polyethylene was placed.  We held  it until the cement hardened.  We ensured that we cleared out the  excess cement.  We took it through full range of motion.  We were  very happy with this.  Tourniquet came down.  Ulnar nerve was left in situ decompressed hemostasis was  obtained.  We copiously irrigated the wound.  We placed a drain in  the superficial space.  We used a #2 FiberWire and 0 Vicryl to close  the tongue triceps.  Two 2-0 Vicryl inverted mattress nylon sutures  with Dermabond completed the closure.  We placed the patient in  extension splint posteriorly, awoke them from anesthesia, and  transferred to PACU, where they were recovering.  Please note that we will be billing for my nurse practitioner's first assist as she was  critical in the center for successful completion of the case including positioning of the body, retraction, suture management, placement of the implants and wound closure. There was no qualified resident available for the entire case  Implants [place] small stems we had to modify the humeral stem to fit her canal 70 millimeters    Complications:  None; patient tolerated the procedure well.    Disposition: PACU - hemodynamically stable.  Condition: stable                 Additional Details: none    Attending Attestation:     Michael Flores  Phone Number: [phone]

## 2025-03-20 ENCOUNTER — APPOINTMENT (OUTPATIENT)
Dept: RADIOLOGY | Facility: HOSPITAL | Age: 70
End: 2025-03-20
Payer: MEDICARE

## 2025-03-20 PROBLEM — R29.810 FACIAL WEAKNESS: Status: ACTIVE | Noted: 2025-03-20

## 2025-03-20 LAB
ANION GAP SERPL CALC-SCNC: 15 MMOL/L (ref 10–20)
BNP SERPL-MCNC: 192 PG/ML (ref 0–99)
BUN SERPL-MCNC: 13 MG/DL (ref 6–23)
CALCIUM SERPL-MCNC: 9.4 MG/DL (ref 8.6–10.3)
CHLORIDE SERPL-SCNC: 104 MMOL/L (ref 98–107)
CHOLEST SERPL-MCNC: 225 MG/DL (ref 0–199)
CHOLESTEROL/HDL RATIO: 3.1
CO2 SERPL-SCNC: 23 MMOL/L (ref 21–32)
CREAT SERPL-MCNC: 0.61 MG/DL (ref 0.5–1.05)
EGFRCR SERPLBLD CKD-EPI 2021: >90 ML/MIN/1.73M*2
ERYTHROCYTE [DISTWIDTH] IN BLOOD BY AUTOMATED COUNT: 12.8 % (ref 11.5–14.5)
EST. AVERAGE GLUCOSE BLD GHB EST-MCNC: 131 MG/DL
GLUCOSE BLD MANUAL STRIP-MCNC: 161 MG/DL (ref 74–99)
GLUCOSE BLD MANUAL STRIP-MCNC: 163 MG/DL (ref 74–99)
GLUCOSE BLD MANUAL STRIP-MCNC: 176 MG/DL (ref 74–99)
GLUCOSE BLD MANUAL STRIP-MCNC: 178 MG/DL (ref 74–99)
GLUCOSE SERPL-MCNC: 147 MG/DL (ref 74–99)
HBA1C MFR BLD: 6.2 %
HCT VFR BLD AUTO: 43.1 % (ref 36–46)
HDLC SERPL-MCNC: 72.1 MG/DL
HGB BLD-MCNC: 14.3 G/DL (ref 12–16)
LDLC SERPL CALC-MCNC: 140 MG/DL
MCH RBC QN AUTO: 29.8 PG (ref 26–34)
MCHC RBC AUTO-ENTMCNC: 33.2 G/DL (ref 32–36)
MCV RBC AUTO: 90 FL (ref 80–100)
NON HDL CHOLESTEROL: 153 MG/DL (ref 0–149)
NRBC BLD-RTO: 0 /100 WBCS (ref 0–0)
PLATELET # BLD AUTO: 205 X10*3/UL (ref 150–450)
POTASSIUM SERPL-SCNC: 4.4 MMOL/L (ref 3.5–5.3)
RBC # BLD AUTO: 4.8 X10*6/UL (ref 4–5.2)
SODIUM SERPL-SCNC: 138 MMOL/L (ref 136–145)
TRIGL SERPL-MCNC: 66 MG/DL (ref 0–149)
VLDL: 13 MG/DL (ref 0–40)
WBC # BLD AUTO: 11.5 X10*3/UL (ref 4.4–11.3)

## 2025-03-20 PROCEDURE — 83036 HEMOGLOBIN GLYCOSYLATED A1C: CPT | Mod: AHULAB

## 2025-03-20 PROCEDURE — 80048 BASIC METABOLIC PNL TOTAL CA: CPT

## 2025-03-20 PROCEDURE — 85027 COMPLETE CBC AUTOMATED: CPT

## 2025-03-20 PROCEDURE — 70553 MRI BRAIN STEM W/O & W/DYE: CPT | Performed by: RADIOLOGY

## 2025-03-20 PROCEDURE — 36415 COLL VENOUS BLD VENIPUNCTURE: CPT

## 2025-03-20 PROCEDURE — A9575 INJ GADOTERATE MEGLUMI 0.1ML: HCPCS | Performed by: INTERNAL MEDICINE

## 2025-03-20 PROCEDURE — 70553 MRI BRAIN STEM W/O & W/DYE: CPT

## 2025-03-20 PROCEDURE — 2500000001 HC RX 250 WO HCPCS SELF ADMINISTERED DRUGS (ALT 637 FOR MEDICARE OP)

## 2025-03-20 PROCEDURE — 2500000002 HC RX 250 W HCPCS SELF ADMINISTERED DRUGS (ALT 637 FOR MEDICARE OP, ALT 636 FOR OP/ED)

## 2025-03-20 PROCEDURE — 80061 LIPID PANEL: CPT

## 2025-03-20 PROCEDURE — RXMED WILLOW AMBULATORY MEDICATION CHARGE

## 2025-03-20 PROCEDURE — 82947 ASSAY GLUCOSE BLOOD QUANT: CPT

## 2025-03-20 PROCEDURE — 2500000001 HC RX 250 WO HCPCS SELF ADMINISTERED DRUGS (ALT 637 FOR MEDICARE OP): Performed by: INTERNAL MEDICINE

## 2025-03-20 PROCEDURE — 2550000001 HC RX 255 CONTRASTS: Performed by: INTERNAL MEDICINE

## 2025-03-20 PROCEDURE — 2500000001 HC RX 250 WO HCPCS SELF ADMINISTERED DRUGS (ALT 637 FOR MEDICARE OP): Performed by: NURSE PRACTITIONER

## 2025-03-20 PROCEDURE — 83880 ASSAY OF NATRIURETIC PEPTIDE: CPT

## 2025-03-20 PROCEDURE — 7100000011 HC EXTENDED STAY RECOVERY HOURLY - NURSING UNIT

## 2025-03-20 PROCEDURE — 99223 1ST HOSP IP/OBS HIGH 75: CPT | Performed by: PSYCHIATRY & NEUROLOGY

## 2025-03-20 RX ORDER — GADOTERATE MEGLUMINE 376.9 MG/ML
15 INJECTION INTRAVENOUS
Status: COMPLETED | OUTPATIENT
Start: 2025-03-20 | End: 2025-03-20

## 2025-03-20 RX ADMIN — LEVOTHYROXINE SODIUM 125 MCG: 0.12 TABLET ORAL at 21:25

## 2025-03-20 RX ADMIN — DOCUSATE SODIUM 100 MG: 100 CAPSULE, LIQUID FILLED ORAL at 10:45

## 2025-03-20 RX ADMIN — OXYCODONE HYDROCHLORIDE 5 MG: 5 TABLET ORAL at 15:53

## 2025-03-20 RX ADMIN — OXYCODONE HYDROCHLORIDE 5 MG: 5 TABLET ORAL at 10:46

## 2025-03-20 RX ADMIN — ACETAMINOPHEN 650 MG: 325 TABLET, FILM COATED ORAL at 05:53

## 2025-03-20 RX ADMIN — ASPIRIN 81 MG: 81 TABLET, CHEWABLE ORAL at 10:45

## 2025-03-20 RX ADMIN — DOCUSATE SODIUM 100 MG: 100 CAPSULE, LIQUID FILLED ORAL at 21:08

## 2025-03-20 RX ADMIN — OXYCODONE HYDROCHLORIDE 5 MG: 5 TABLET ORAL at 22:08

## 2025-03-20 RX ADMIN — ACETAMINOPHEN 650 MG: 325 TABLET, FILM COATED ORAL at 17:55

## 2025-03-20 RX ADMIN — GADOTERATE MEGLUMINE 15 ML: 376.9 INJECTION INTRAVENOUS at 09:46

## 2025-03-20 RX ADMIN — ACETAMINOPHEN 650 MG: 325 TABLET, FILM COATED ORAL at 22:54

## 2025-03-20 RX ADMIN — ROPINIROLE HYDROCHLORIDE 0.5 MG: 0.25 TABLET, FILM COATED ORAL at 21:08

## 2025-03-20 RX ADMIN — AMLODIPINE BESYLATE 5 MG: 5 TABLET ORAL at 21:25

## 2025-03-20 RX ADMIN — ACETAMINOPHEN 650 MG: 325 TABLET, FILM COATED ORAL at 13:16

## 2025-03-20 SDOH — SOCIAL STABILITY: SOCIAL INSECURITY: WITHIN THE LAST YEAR, HAVE YOU BEEN AFRAID OF YOUR PARTNER OR EX-PARTNER?: NO

## 2025-03-20 SDOH — SOCIAL STABILITY: SOCIAL INSECURITY: HAVE YOU HAD ANY THOUGHTS OF HARMING ANYONE ELSE?: NO

## 2025-03-20 SDOH — ECONOMIC STABILITY: HOUSING INSECURITY: IN THE LAST 12 MONTHS, WAS THERE A TIME WHEN YOU WERE NOT ABLE TO PAY THE MORTGAGE OR RENT ON TIME?: NO

## 2025-03-20 SDOH — SOCIAL STABILITY: SOCIAL INSECURITY: HAVE YOU HAD THOUGHTS OF HARMING ANYONE ELSE?: NO

## 2025-03-20 SDOH — SOCIAL STABILITY: SOCIAL INSECURITY: WITHIN THE LAST YEAR, HAVE YOU BEEN HUMILIATED OR EMOTIONALLY ABUSED IN OTHER WAYS BY YOUR PARTNER OR EX-PARTNER?: NO

## 2025-03-20 SDOH — ECONOMIC STABILITY: HOUSING INSECURITY: AT ANY TIME IN THE PAST 12 MONTHS, WERE YOU HOMELESS OR LIVING IN A SHELTER (INCLUDING NOW)?: NO

## 2025-03-20 SDOH — SOCIAL STABILITY: SOCIAL INSECURITY: WERE YOU ABLE TO COMPLETE ALL THE BEHAVIORAL HEALTH SCREENINGS?: YES

## 2025-03-20 SDOH — ECONOMIC STABILITY: FOOD INSECURITY: WITHIN THE PAST 12 MONTHS, YOU WORRIED THAT YOUR FOOD WOULD RUN OUT BEFORE YOU GOT THE MONEY TO BUY MORE.: NEVER TRUE

## 2025-03-20 SDOH — ECONOMIC STABILITY: HOUSING INSECURITY: DO YOU FEEL UNSAFE GOING BACK TO THE PLACE WHERE YOU LIVE?: NO

## 2025-03-20 SDOH — ECONOMIC STABILITY: HOUSING INSECURITY: IN THE PAST 12 MONTHS, HOW MANY TIMES HAVE YOU MOVED WHERE YOU WERE LIVING?: 0

## 2025-03-20 SDOH — ECONOMIC STABILITY: FOOD INSECURITY: WITHIN THE PAST 12 MONTHS, THE FOOD YOU BOUGHT JUST DIDN'T LAST AND YOU DIDN'T HAVE MONEY TO GET MORE.: NEVER TRUE

## 2025-03-20 SDOH — ECONOMIC STABILITY: INCOME INSECURITY: IN THE PAST 12 MONTHS HAS THE ELECTRIC, GAS, OIL, OR WATER COMPANY THREATENED TO SHUT OFF SERVICES IN YOUR HOME?: NO

## 2025-03-20 SDOH — ECONOMIC STABILITY: FOOD INSECURITY: HOW HARD IS IT FOR YOU TO PAY FOR THE VERY BASICS LIKE FOOD, HOUSING, MEDICAL CARE, AND HEATING?: NOT HARD AT ALL

## 2025-03-20 SDOH — SOCIAL STABILITY: SOCIAL INSECURITY: DO YOU FEEL ANYONE HAS EXPLOITED OR TAKEN ADVANTAGE OF YOU FINANCIALLY OR OF YOUR PERSONAL PROPERTY?: NO

## 2025-03-20 SDOH — SOCIAL STABILITY: SOCIAL INSECURITY: DOES ANYONE TRY TO KEEP YOU FROM HAVING/CONTACTING OTHER FRIENDS OR DOING THINGS OUTSIDE YOUR HOME?: NO

## 2025-03-20 SDOH — SOCIAL STABILITY: SOCIAL INSECURITY: HAS ANYONE EVER THREATENED TO HURT YOUR FAMILY OR YOUR PETS?: NO

## 2025-03-20 SDOH — SOCIAL STABILITY: SOCIAL INSECURITY: DO YOU FEEL UNSAFE GOING BACK TO THE PLACE WHERE YOU ARE LIVING?: NO

## 2025-03-20 SDOH — ECONOMIC STABILITY: TRANSPORTATION INSECURITY: IN THE PAST 12 MONTHS, HAS LACK OF TRANSPORTATION KEPT YOU FROM MEDICAL APPOINTMENTS OR FROM GETTING MEDICATIONS?: NO

## 2025-03-20 SDOH — SOCIAL STABILITY: SOCIAL INSECURITY: ARE THERE ANY APPARENT SIGNS OF INJURIES/BEHAVIORS THAT COULD BE RELATED TO ABUSE/NEGLECT?: NO

## 2025-03-20 SDOH — SOCIAL STABILITY: SOCIAL INSECURITY: ABUSE: ADULT

## 2025-03-20 SDOH — SOCIAL STABILITY: SOCIAL INSECURITY: ARE YOU OR HAVE YOU BEEN THREATENED OR ABUSED PHYSICALLY, EMOTIONALLY, OR SEXUALLY BY ANYONE?: NO

## 2025-03-20 ASSESSMENT — ACTIVITIES OF DAILY LIVING (ADL)
TOILETING: INDEPENDENT
BATHING: INDEPENDENT
JUDGMENT_ADEQUATE_SAFELY_COMPLETE_DAILY_ACTIVITIES: YES
ADEQUATE_TO_COMPLETE_ADL: YES
HEARING - RIGHT EAR: HEARING AID
LACK_OF_TRANSPORTATION: NO
WALKS IN HOME: INDEPENDENT
DRESSING YOURSELF: INDEPENDENT
FEEDING YOURSELF: INDEPENDENT
LACK_OF_TRANSPORTATION: NO
GROOMING: INDEPENDENT
PATIENT'S MEMORY ADEQUATE TO SAFELY COMPLETE DAILY ACTIVITIES?: YES
HEARING - LEFT EAR: FUNCTIONAL
LACK_OF_TRANSPORTATION: NO

## 2025-03-20 ASSESSMENT — PAIN DESCRIPTION - LOCATION
LOCATION: ELBOW

## 2025-03-20 ASSESSMENT — LIFESTYLE VARIABLES
HOW OFTEN DO YOU HAVE A DRINK CONTAINING ALCOHOL: NEVER
AUDIT-C TOTAL SCORE: 0
HOW OFTEN DO YOU HAVE 6 OR MORE DRINKS ON ONE OCCASION: NEVER
AUDIT-C TOTAL SCORE: 0
SUBSTANCE_ABUSE_PAST_12_MONTHS: NO
SKIP TO QUESTIONS 9-10: 1
PRESCIPTION_ABUSE_PAST_12_MONTHS: NO
HOW MANY STANDARD DRINKS CONTAINING ALCOHOL DO YOU HAVE ON A TYPICAL DAY: PATIENT DOES NOT DRINK

## 2025-03-20 ASSESSMENT — COGNITIVE AND FUNCTIONAL STATUS - GENERAL
PATIENT BASELINE BEDBOUND: NO
MOBILITY SCORE: 24
DAILY ACTIVITIY SCORE: 24
MOBILITY SCORE: 24
DAILY ACTIVITIY SCORE: 24

## 2025-03-20 ASSESSMENT — PATIENT HEALTH QUESTIONNAIRE - PHQ9
SUM OF ALL RESPONSES TO PHQ9 QUESTIONS 1 & 2: 0
2. FEELING DOWN, DEPRESSED OR HOPELESS: NOT AT ALL
1. LITTLE INTEREST OR PLEASURE IN DOING THINGS: NOT AT ALL

## 2025-03-20 ASSESSMENT — PAIN SCALES - GENERAL
PAINLEVEL_OUTOF10: 0 - NO PAIN
PAINLEVEL_OUTOF10: 3
PAINLEVEL_OUTOF10: 1
PAINLEVEL_OUTOF10: 0 - NO PAIN
PAINLEVEL_OUTOF10: 3
PAINLEVEL_OUTOF10: 8
PAINLEVEL_OUTOF10: 7
PAINLEVEL_OUTOF10: 5 - MODERATE PAIN

## 2025-03-20 ASSESSMENT — PAIN - FUNCTIONAL ASSESSMENT
PAIN_FUNCTIONAL_ASSESSMENT: 0-10

## 2025-03-20 ASSESSMENT — PAIN DESCRIPTION - ORIENTATION
ORIENTATION: RIGHT

## 2025-03-20 NOTE — PROGRESS NOTES
03/20/25 1315   [facility] Disability Status   Are you deaf or do you have serious difficulty hearing? N   Are you blind or do you have serious difficulty seeing, even when wearing glasses? N   Because of a physical, mental, or emotional condition, do you have serious difficulty concentrating, remembering, or making decisions? (5 years old or older) N   Do you have serious difficulty walking or climbing stairs? N   Do you have serious difficulty dressing or bathing? N   Because of a physical, mental, or emotional condition, do you have serious difficulty doing errands alone such as visiting the doctor? N

## 2025-03-20 NOTE — PROGRESS NOTES
"Florina Doll \"Dionne\" is a 70 y.o. female on day 0 of admission presenting with Elbow arthritis.    POD1 s/p right total elbow arthroplasty with Dr. Flores.     Subjective   Stroke alert called yesterday evening due to new onset facial droop. CT negative. Temp 100.6 at last check. Reports she is doing well this AM. Denies numbness and tingling in the RUE. Urinating without issue. Denies chest pain and SOB.        Objective     Physical Exam  Vitals and nursing note reviewed.   Constitutional:       Appearance: Normal appearance.   Eyes:      Extraocular Movements: Extraocular movements intact.      Conjunctiva/sclera: Conjunctivae normal.      Pupils: Pupils are equal, round, and reactive to light.   Cardiovascular:      Rate and Rhythm: Normal rate and regular rhythm.   Pulmonary:      Effort: Pulmonary effort is normal.   Neurological:      General: No focal deficit present.      Mental Status: She is alert and oriented to person, place, and time.      Cranial Nerves: No cranial nerve deficit.      Motor: No weakness.       RUE: Splint in place.   Drain with SS output.   Motor intact in median/ulnar/radial/AIN/PIN  SILT in all distributions.   Brisk cap refill in the fingers.     Last Recorded Vitals  Blood pressure (!) 158/95, pulse 104, temperature (!) 38.1 °C (100.6 °F), temperature source Temporal, resp. rate 17, height 1.549 m (5' 1\"), weight 86.3 kg (190 lb 4.1 oz), SpO2 94%, not currently breastfeeding.  Intake/Output last 3 Shifts:  I/O last 3 completed shifts:  In: 1000 (11.6 mL/kg) [I.V.:1000 (11.6 mL/kg)]  Out: 30 (0.3 mL/kg) [Blood:30]  Weight: 86.3 kg     Relevant Results      Scheduled medications  acetaminophen, 650 mg, oral, q6h AMARILYS  amLODIPine, 5 mg, oral, Daily  aspirin, 81 mg, oral, Daily  atorvastatin, 40 mg, oral, Nightly  docusate sodium, 100 mg, oral, BID  levothyroxine, 125 mcg, oral, Daily  polyethylene glycol, 17 g, oral, Daily  rOPINIRole, 0.5 mg, oral, Nightly      Continuous " medications  lactated Ringer's, 50 mL/hr, Last Rate: 50 mL/hr (03/20/25 0337)      PRN medications  PRN medications: HYDROmorphone, [Held by provider] ibuprofen, labetaloL, magnesium hydroxide, naloxone, ondansetron **OR** ondansetron, oxyCODONE, oxyCODONE, oxygen  Results for orders placed or performed during the hospital encounter of 03/19/25 (from the past 24 hours)   POCT GLUCOSE   Result Value Ref Range    POCT Glucose 195 (H) 74 - 99 mg/dL               XR right elbow with no acute post-op complications.              Assessment/Plan   Assessment & Plan  Elbow arthritis    Prediabetes    Class 2 obesity in adult    POD1 s/p right elbow arthroplasty. New onset facial droop on evening of POD0.     Facial droop  Stroke alert called.   CT negative.   MRI pending.   Will follow medicine and neurology recs.     Activity: PT/OT.   Antibiotics: oliver-op ancef  Diet: advance as tolerated  Drain: will remove later this AM.   Dressing: splint to remain until follow up  Pain: multimodal  Weight Bearing: NWB RUE  Dispo: pending clinical course, neuro recs, PT/OT  Follow Up: in 7-10 days for splint removal with [facility].        I spent 15 minutes in the professional and overall care of this patient.      Jerardo Nichols MD

## 2025-03-20 NOTE — DISCHARGE SUMMARY
Discharge Diagnosis  Elbow arthritis    Issues Requiring Follow-Up  ***    Test Results Pending At Discharge  Pending Labs       No current pending labs.            Hospital Course   The patient presented for a right total elbow arthroplasty. Her post-op course was complicated by facial droop. A stroke code was called. CT and MRI were negative.    At the time of discharge, the patient was tolerating a regular diet, urinating without issue, and her pain was controlled on an oral regimen.     Pertinent Physical Exam At Time of Discharge  Physical Exam  Vitals and nursing note reviewed.   Constitutional:       Appearance: Normal appearance.   Cardiovascular:      Rate and Rhythm: Normal rate and regular rhythm.   Pulmonary:      Effort: Pulmonary effort is normal.   Neurological:      Mental Status: She is alert.       RUE: Splint in place.   Drain with SS output.   Motor intact in median/ulnar/radial/AIN/PIN  SILT in all distributions.   Brisk cap refill in the fingers  Home Medications     Medication List      ASK your doctor about these medications     acetaminophen 500 mg tablet; Commonly known as: Tylenol   amLODIPine 5 mg tablet; Commonly known as: Norvasc   aspirin 81 mg EC tablet; Take 1 tablet (81 mg) by mouth once daily for   14 days.   b complex vitamins capsule   BERBERINE CHLORIDE ORAL   calcium carbonate 500 mg (200 mg elemental) chewable tablet; Commonly   known as: Tums   calcium carbonate-vitamin D3 600 mg-5 mcg (200 unit) tablet   cetirizine 10 mg tablet; Commonly known as: ZyrTEC   chlorhexidine 0.12 % solution; Commonly known as: Peridex; SWISH AND   SPIT 15ML FOR 30 SECONDS NIGHT PRIOR TO SURGERY AND MORNING OF SURGERY   docusate sodium 100 mg capsule; Commonly known as: Colace; Take 1   capsule (100 mg) by mouth 2 times a day for 15 days.   garlic 1,000 mg capsule   ibuprofen 800 mg tablet   irbesartan-hydrochlorothiazide 300-12.5 mg tablet; Commonly known as:   Avalide   krill oil 500 mg  capsule   levothyroxine 125 mcg tablet; Commonly known as: Synthroid, Levoxyl   multivitamin tablet   NON FORMULARY   omeprazole 40 mg DR capsule; Commonly known as: PriLOSEC; Take 1 capsule   (40 mg) by mouth once daily in the morning. Take before meals for 3 days.   Do not crush or chew.   oxyCODONE 5 mg immediate release tablet; Commonly known as: Roxicodone;   Take 1 tablet (5 mg) by mouth every 4 hours if needed for severe pain (7 -   10) for up to 6 days.   red yeast rice 600 mg capsule   rOPINIRole 0.5 mg tablet; Commonly known as: Requip       Outpatient Follow-Up  Future Appointments   Date Time Provider Department Center   4/7/2025  1:00 PM Irma Claros, NIRMALA-CNP [id number] Cristóbal Nichols MD

## 2025-03-20 NOTE — NURSING NOTE
Rapid Response Nurse Note:     Dionne Doll is a 70 y.o. female on day 0 of admission presenting with Elbow arthritis.    Rounded on patient due to recent stroke alert, reviewed patient chart and checked with bedside nurse for any questions or concerns none voiced at this time.    Patient current RADAR score is 0    /88 (BP Location: Left arm, Patient Position: Sitting)   Pulse 90   Temp 36.1 °C (97 °F) (Oral)   Resp 17   Wt 86.3 kg (190 lb 4.1 oz)   SpO2 99%     No signs/symptoms of distress at this time. Bedside nurse notified to escalate concerns if patient condition changes      Slime Becker RN

## 2025-03-20 NOTE — CARE PLAN
The patient's goals for the shift include      The clinical goals for the shift include Pain management and monitor facial drooping    Problem: Pain - Adult  Goal: Verbalizes/displays adequate comfort level or baseline comfort level  Outcome: Progressing     Problem: Discharge Planning  Goal: Discharge to home or other facility with appropriate resources  Outcome: Progressing     Problem: Chronic Conditions and Co-morbidities  Goal: Patient's chronic conditions and co-morbidity symptoms are monitored and maintained or improved  Outcome: Progressing     Problem: Nutrition  Goal: Nutrient intake appropriate for maintaining nutritional needs  Outcome: Progressing

## 2025-03-20 NOTE — PROGRESS NOTES
"Occupational Therapy                 Therapy Communication Note    Patient Name: Florina Doll \"Dionne\"  MRN: [id number]  Department: [facility]   Room: [address] [place] [address]  Today's Date: 3/20/2025     Discipline: Occupational Therapy    OT consult received, pt chart reviewed. Pt sitting EOB A&Ox4 eating breakfast with L UE. Had same surgery (total elbow replacement) to L UE 11/2024, familiar with post op process and protocol. Up ad cal and independent with ambulation in room without device. Aware of post op process, has needed DME and assistance at home. No residual acute deficits from Code stroke and MRI (-) for CVA. Will dc pt from OT caseload. Please re-consult as needed.  "

## 2025-03-20 NOTE — PROGRESS NOTES
"Physical Therapy                 Therapy Communication Note    Patient Name: Florina Doll \"Dionne\"  MRN: [id number]  Department: [place] [address]  Room: [address] [place] [address]  Today's Date: 3/20/2025     Discipline: Physical Therapy    PT Missed Visit:  (No)     Comment: PT screen. Per communication with assigned OT, pt up and ambulating independently without acute PT needs at this time. RN made aware of PT screen.  "

## 2025-03-20 NOTE — PROGRESS NOTES
03/20/25 1313   Discharge Planning   Living Arrangements Spouse/significant other   Support Systems Spouse/significant other   Assistance Needed Independent   Type of Residence Private residence   Number of Stairs to Enter Residence 7   Number of Stairs Within Residence 1   Do you have animals or pets at home? No   Who is requesting discharge planning? Patient   Home or Post Acute Services None   Expected Discharge Disposition Home   Does the patient need discharge transport arranged? No   Financial Resource Strain   How hard is it for you to pay for the very basics like food, housing, medical care, and heating? Not hard   Housing Stability   In the last 12 months, was there a time when you were not able to pay the mortgage or rent on time? N   In the past 12 months, how many times have you moved where you were living? 0   At any time in the past 12 months, were you homeless or living in a shelter (including now)? N   Transportation Needs   In the past 12 months, has lack of transportation kept you from medical appointments or from getting medications? no   In the past 12 months, has lack of transportation kept you from meetings, work, or from getting things needed for daily living? No   Stroke Family Assessment   Stroke Family Assessment Needed No   Intensity of Service   Intensity of Service 0-30 min     Met with patient at bedside to discuss discharge plan.  PLAN/BARRIER: PT, MRI brain pending  DISP: home  O2: none  DME: none  WOUNDS: surgical  Patient denies any homecare needs and her spouse will transport her home.  Dunia Lemons RN

## 2025-03-20 NOTE — CONSULTS
Consults    History Of Present Illness  Ms. Doll is a pleasant 70 y.o. RH woman with PMHx of HTN, HLD, hypothyroidism and osteoarthritis, s/p left elbow total arthroplasty, b/l TKA, Patient is s/p Right elbow total arthroplasty, who underwent right subclavian nerve block pre-op. Last known well on Wednesday, 3/19 at ~1300. Upon awakening post-op at approximately 1900 a right facial droop was noted prompting a stroke alert, neurology consulted for further evaluation. Patient is seen today, she denies rash, recent travel or tick bites, no recent viral or febrile illness, no recent ear infection, or prior history of facial weakness, no history of parotid neoplasm or neck swelling. She denies focal weakness, tingling, numbness, no speech difficulty or imbalance. No hyperacusis, no abnormal taste or excessive lacrimation.  Past Medical History  Past Medical History:   Diagnosis Date    Arthritis of elbow, left     s/p Left Elbow Total Arthroplasty 11/18/2024    Arthritis of elbow, right     Plan: Right Elbow Total Arthroplasty 3/19/2025    Cleft lip and cleft palate ([facility])     Diverticulosis     DVT (deep venous thrombosis) ([facility])     Right poipliteal, after TKA    Hearing aid worn     Right ear only    Hyperlipidemia     Hypertension     Hypothyroidism     Obesity     PONV (postoperative nausea and vomiting)     Primary osteoarthritis, right ankle and foot     s/p ARTHRODESIS MIDTARSAL OR TARSOMETATARSAL, MULTIPLE OR TRANSVERSE   Right 8/18/23    RLS (restless legs syndrome)      Surgical History  Past Surgical History:   Procedure Laterality Date    ADENOIDECTOMY      ANKLE FRACTURE SURGERY  1994    APPENDECTOMY  1990    CHOLECYSTECTOMY  1999    COLON SURGERY  1990    resection for diverticulitis    COLONOSCOPY      DILATION AND CURETTAGE OF UTERUS      1980, 1986 lysis of adhesions    ELBOW ARTHROPLASTY Left 11/18/2024    ELBOW FUSION Left 1978    FOOT FUSION Right 08/2023    ARTHRODESIS MIDTARSAL OR  TARSOMETATARSAL, MULTIPLE OR TRANSVERSE    FOOT FUSION Right 12/2023    REVISION    FOOT SURGERY Bilateral     spherical implants    HARDWARE REMOVAL FOOT / ANKLE Left 1995    HARDWARE REMOVAL FOOT / ANKLE  12/12/2023    REMOVAL BURIED HARDWAR    LASIK Bilateral 1999    MOUTH SURGERY      multiple surgeries on cleft lip and palate    ORIF ANKLE FRACTURE Left 1994    TONSILLECTOMY      TOTAL KNEE ARTHROPLASTY Right 2019    TOTAL KNEE ARTHROPLASTY Left     TYMPANOSTOMY TUBE PLACEMENT      WRIST SURGERY Right 2005     Social History  Social History     Tobacco Use    Smoking status: Never    Smokeless tobacco: Never   Vaping Use    Vaping status: Never Used   Substance Use Topics    Alcohol use: Never    Drug use: Never     Allergies  Clarithromycin, Sulfa (sulfonamide antibiotics), Topiramate, Cefazolin, Iodinated contrast media, and Morphine  Medications Prior to Admission   Medication Sig Dispense Refill Last Dose/Taking    acetaminophen (Tylenol) 500 mg tablet Take 2 tablets (1,000 mg) by mouth every 8 hours if needed for mild pain (1 - 3).   3/19/2025 Morning    amLODIPine (Norvasc) 5 mg tablet Take 1 tablet (5 mg) by mouth once daily.   3/18/2025 Evening    b complex vitamins capsule Take 1 capsule by mouth once daily.   Past Week    BERBERINE CHLORIDE ORAL Take 1,500 mg by mouth once daily.   Past Week    calcium carbonate (Tums) 200 mg calcium chewable tablet Chew 1 tablet (500 mg) 4 times a day as needed for indigestion or heartburn.   Past Week    calcium carbonate-vitamin D3 600 mg-5 mcg (200 unit) tablet Take 2 tablets by mouth once daily.   Past Week    cetirizine (ZyrTEC) 10 mg tablet Take 1 tablet (10 mg) by mouth once daily in the morning. Take before meals.   3/18/2025    chlorhexidine (Peridex) 0.12 % solution SWISH AND SPIT 15ML FOR 30 SECONDS NIGHT PRIOR TO SURGERY AND MORNING OF SURGERY 473 mL 0 3/19/2025 Morning    garlic 1,000 mg capsule Take 1 capsule by mouth once daily.   Past Week     ibuprofen 800 mg tablet Take 1 tablet (800 mg) by mouth 3 times a day.   Past Week    irbesartan-hydrochlorothiazide (Avalide) 300-12.5 mg tablet Take 1 tablet by mouth once daily.   3/18/2025 Morning    krill oil 500 mg capsule Take 1 capsule (500 mg) by mouth once daily.   Past Week    levothyroxine (Synthroid, Levoxyl) 125 mcg tablet Take 1 tablet (125 mcg) by mouth early in the morning..   3/18/2025 Evening    multivitamin tablet Take 1 tablet by mouth once daily.   Past Week    NON FORMULARY Take 2 each by mouth once daily. MORINGA   Past Week    red yeast rice 600 mg capsule Take 2 capsules by mouth once daily.   Past Week    rOPINIRole (Requip) 0.5 mg tablet Take 1 tablet (0.5 mg) by mouth once daily at bedtime.   3/18/2025 Evening       Review of Systems  Neurological Exam  Physical Exam  Mental Status Examination:  General appearance: Well-groomed, good eye contact, cooperative  Orientation: Alert and oriented to person, place, and time  Motor: no psychomotor agitation or retardation, stable gait  Speech: regular rate, rhythm, tone, and volume    NEUROLOGICAL EXAMINATION.    Cranial nerves:  CN II: visual fields full to confrontation  CN III, IV, VI: Pupils round, reactive to light and accommodation.  Lids symmetric.  No ptosis.  Extra-occular muscles are intact with normal alignment.  No nystagmus  CN V: Facial sensation intact bilaterally.    CN VII: Asymmetric facial strength.  Nasolabial folds asymmetric, with right facial weakness involving mainly lower face.  CN VIII: Hearing intact to finger rub  CN IX: Palate elevates symmetrically.  CN XI: Normal shoulder shrug and neck turning  CN XII: Tongue midline, with normal bulk and strength, no fasciculations        Motor:  Muscle bulk was normal in all extremities.  5/5 strength in RLE, LUE and LLE, unable to assess RUE as it is placed in cast  Normal finger taps and hand opening on the left side  Normal tone  No abnormal movements    Reflexes:   Right UE   "   LEFT UE  BR: unable to assess         BR: 2  Biceps: unable to assess       Biceps: 2  Triceps: unable to assess     Triceps: 2    RIGHT LE     LEFT LE  Knee: 2        Knee: 2  Ankle: 2       Ankle: 2    Negative Earl's reflex  No frontal release signs    Coordination:  Normal finger to nose testing and rapid alternating movements on the left side, unable to assess right side  Heel to shin is intact bilaterally    Gait:  Deferred for patient's safety    Sensory:  Normal to light touch bilaterally  Last Recorded Vitals  Blood pressure 155/88, pulse 90, temperature 36.1 °C (97 °F), temperature source Oral, resp. rate 17, height 1.549 m (5' 1\"), weight 86.3 kg (190 lb 4.1 oz), SpO2 99%, not currently breastfeeding.    Relevant Results  Results for orders placed or performed during the hospital encounter of 03/19/25 (from the past 24 hours)   POCT GLUCOSE   Result Value Ref Range    POCT Glucose 195 (H) 74 - 99 mg/dL   B-Type Natriuretic Peptide   Result Value Ref Range     (H) 0 - 99 pg/mL   Lipid Panel   Result Value Ref Range    Cholesterol 225 (H) 0 - 199 mg/dL    HDL-Cholesterol 72.1 mg/dL    Cholesterol/HDL Ratio 3.1     LDL Calculated 140 (H) <=99 mg/dL    VLDL 13 0 - 40 mg/dL    Triglycerides 66 0 - 149 mg/dL    Non HDL Cholesterol 153 (H) 0 - 149 mg/dL   CBC   Result Value Ref Range    WBC 11.5 (H) 4.4 - 11.3 x10*3/uL    nRBC 0.0 0.0 - 0.0 /100 WBCs    RBC 4.80 4.00 - 5.20 x10*6/uL    Hemoglobin 14.3 12.0 - 16.0 g/dL    Hematocrit 43.1 36.0 - 46.0 %    MCV 90 80 - 100 fL    MCH 29.8 26.0 - 34.0 pg    MCHC 33.2 32.0 - 36.0 g/dL    RDW 12.8 11.5 - 14.5 %    Platelets 205 150 - 450 x10*3/uL   Basic Metabolic Panel   Result Value Ref Range    Glucose 147 (H) 74 - 99 mg/dL    Sodium 138 136 - 145 mmol/L    Potassium 4.4 3.5 - 5.3 mmol/L    Chloride 104 98 - 107 mmol/L    Bicarbonate 23 21 - 32 mmol/L    Anion Gap 15 10 - 20 mmol/L    Urea Nitrogen 13 6 - 23 mg/dL    Creatinine 0.61 0.50 - 1.05 mg/dL    " eGFR >90 >60 mL/min/1.73m*2    Calcium 9.4 8.6 - 10.3 mg/dL   POCT GLUCOSE   Result Value Ref Range    POCT Glucose 161 (H) 74 - 99 mg/dL      Scheduled medications  acetaminophen, 650 mg, oral, q6h AMARILYS  amLODIPine, 5 mg, oral, Daily  aspirin, 81 mg, oral, Daily  atorvastatin, 40 mg, oral, Nightly  docusate sodium, 100 mg, oral, BID  levothyroxine, 125 mcg, oral, Daily  polyethylene glycol, 17 g, oral, Daily  rOPINIRole, 0.5 mg, oral, Nightly      Continuous medications  lactated Ringer's, 50 mL/hr, Last Rate: 50 mL/hr (03/20/25 0337)      PRN medications  PRN medications: HYDROmorphone, [Held by provider] ibuprofen, labetaloL, magnesium hydroxide, naloxone, ondansetron **OR** ondansetron, oxyCODONE, oxyCODONE, oxygen     I have personally reviewed the following imaging results MR brain w and wo IV contrast    Result Date: 3/20/2025  Interpreted By:  Henry Muñoz, STUDY: MR BRAIN W AND WO IV CONTRAST; ;  3/20/2025 10:03 am   INDICATION: Signs/Symptoms:eval for stroke.     COMPARISON: CT head from 03/19/2025.   ACCESSION NUMBER(S): [id number]   ORDERING CLINICIAN: SRINIVAS SALAZAR   TECHNIQUE: MRI of the brain was performed with the acquisition of axial diffusion-weighted, axial T1, axial FLAIR, axial T2 gradient echo, axial T2 fat saturated, axial T1 inversion recovery fat saturated postcontrast sequence, and axial T1 volumetric post-contrast sequence with multiplanar reformats.   Contrast: 15 mL of Dotarem was injected intravenously.   FINDINGS: There is no acute intracranial hemorrhage or infarct. There is no abnormal extra-axial fluid collection or mass effect.   Ventricles, sulci, and basilar cisterns are normal in size, shape, and configuration. There are multiple small foci of T2 hyperintensity located within bilateral cerebral hemispheric white matter and amos which are probably sequela of chronic small vessel ischemic changes.   There is no abnormal intracranial enhancement or mass.   Visualized paranasal  sinuses and mastoid air cells are essentially clear.       1. No acute intracranial abnormality or mass effect.   2. No abnormal intracranial enhancement or mass.   3. Findings of probable chronic small vessel ischemic changes.   This study was interpreted at [facility].   MACRO: None   Signed by: Henry Muñoz 3/20/2025 10:31 AM Dictation workstation:   [id number]    XR elbow right 1-2 views    Result Date: 3/20/2025  Interpreted By:  Himanshu Kolb, STUDY: XR ELBOW RIGHT 1-2 VIEWS;  3/19/2025 6:55 pm   INDICATION: Signs/Symptoms:s/p elbow replacement, one view.   COMPARISON: Pre-surgical exam from 04/12/2024   ACCESSION NUMBER(S): [id number]   ORDERING CLINICIAN: NEISHA COTA   TECHNIQUE: Portable postoperative AP view of the elbow was obtained.   FINDINGS: Immediately status post elbow arthroplasty. There is a proximal ulnar component and a distal humeral component which appear to be well seated and well aligned. No acute fracture evident in this single view. There is a surgical drain in place. There is a splint in place as well.       Status post right elbow arthroplasty with surgical changes as described.   MACRO: None   Signed by: Himanshu Kolb 3/20/2025 7:59 AM Dictation workstation:   [id number]    CT brain attack head wo IV contrast    Result Date: 3/19/2025  Interpreted By:  Aldo Manley, STUDY: CT BRAIN ATTACK HEAD WO IV CONTRAST;  3/19/2025 7:52 pm   INDICATION: Signs/Symptoms:new onset right lower facial droop.     COMPARISON: None.   ACCESSION NUMBER(S): [id number]   ORDERING CLINICIAN: SRINIVAS SALAZAR   TECHNIQUE: Noncontrast axial CT scan of head was performed. Angled reformats in brain and bone windows were generated. The images were reviewed in bone, brain, blood and soft tissue windows.   FINDINGS: CSF Spaces: The ventricles, sulci and basal cisterns are within normal limits. There is no extraaxial fluid collection.   Parenchyma: There are bilateral subinsular  hypodensities likely representing dilated perivascular spaces. The grey-white differentiation is intact. There is no mass effect or midline shift. There is no intracranial hemorrhage.   Calvarium: The calvarium is unremarkable.   Paranasal sinuses and mastoids: Visualized paranasal sinuses and mastoids are clear.       No evidence of acute cortical infarct or intracranial hemorrhage. If there is persistent concern for acute ischemia consider further evaluation with brain MRI.     Aldo Manley discussed the significance and urgency of this critical finding by telephone with  Dr Ferreira on 3/19/2025 at 7:59 pm. (**-RCF-**) Findings:  See findings.     MACRO: None   Signed by: Aldo Manley 3/19/2025 8:00 PM Dictation workstation:   [id number]    ECG 12 Lead    Result Date: 3/6/2025  Normal sinus rhythm with sinus arrhythmia Right bundle branch block Abnormal ECG When compared with ECG of 25-OCT-2024 10:38, T wave inversion now evident in Anterior leads Confirmed by Wesley La ([id number]) on 3/6/2025 12:53:44 PM.  Assessment & Plan  Facial weakness    Elbow arthritis    Prediabetes    Class 2 obesity in adult    MsAria Doll is a pleasant 70 y.o. RH woman with PMHx of HTN, HLD, hypothyroidism and osteoarthritis, Patient is s/p Right elbow total arthroplasty, she developed right sided facial weakness upon awakening postoperatively, no history of tick bites or rash, no recent ear or parotid infection, no systemic symptoms, neurological examination is notable for moderate right sided facial weakness involving mainly lower face. Neurological exam is limited in RUE placed in cast postoperatively, no other acute focal neurological deficit on exam.  MRI brain w/wo contrast which I personally reviewed showed no acute intracranial abnormalities or diffusion restriction suggestive of acute stroke, no abnormal enhancement.  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 Will check Lyme serology, recommend one week of oral prednisone if orthopedics agreeable in the setting of recent surgery, PT, OT.  If no recovery within 3 months, alternative diagnosis and additional work-up should be considered.     Recommendations:   Oral prednisone 60 mg daily for 7 days  Meticulous eye care, and artificial tears  PPI PPX  DVT PPX  Check Lyme serology, ordered  PT, OT, ST  Follow-up with neurology outpatient    I spent 79 minutes in the professional and overall care of this patient.      Mariana Ontiveros MD

## 2025-03-20 NOTE — CARE PLAN
The clinical goals for the shift include Pain management and monitor facial drooping      Problem: Pain - Adult  Goal: Verbalizes/displays adequate comfort level or baseline comfort level  Outcome: Progressing     Problem: Safety - Adult  Goal: Free from fall injury  Outcome: Met    Problem: Chronic Conditions and Co-morbidities  Goal: Patient's chronic conditions and co-morbidity symptoms are monitored and maintained or improved  Outcome: Progressing     Problem: Nutrition  Goal: Nutrient intake appropriate for maintaining nutritional needs  Outcome: Progressing

## 2025-03-20 NOTE — NURSING NOTE
NIH Stroke Scale      Interval: Baseline  Time:   Person Administering Scale: Ava Brewer RN      1a  Level of consciousness: 0=alert; keenly responsive   1b. LOC questions:  0=Performs both tasks correctly   1c. LOC commands: 0=Performs both tasks correctly   2.  Best Gaze: 0=normal   3.  Visual: 0=No visual loss   4. Facial Palsy: 1=Minor paralysis (flattened nasolabial fold, asymmetric on smiling)   5a.  Motor left arm: 0=No drift, limb holds 90 (or 45) degrees for full 10 seconds   5b.  Motor right arm: 0=No drift, limb holds 90 (or 45) degrees for full 10 seconds   6a. motor left le=No drift, limb holds 90 (or 45) degrees for full 10 seconds   6b  Motor right le=No drift, limb holds 90 (or 45) degrees for full 10 seconds   7. Limb Ataxia: 0=Absent   8.  Sensory: 0=Normal; no sensory loss   9. Best Language:  0=No aphasia, normal   10. Dysarthria: 0=Normal   11. Extinction and Inattention: 0=No abnormality   12. Distal motor function: 0=Normal    Total:   1

## 2025-03-21 ENCOUNTER — TELEPHONE (OUTPATIENT)
Dept: ORTHOPEDIC SURGERY | Facility: HOSPITAL | Age: 70
End: 2025-03-21
Payer: MEDICARE

## 2025-03-21 ENCOUNTER — PHARMACY VISIT (OUTPATIENT)
Dept: PHARMACY | Facility: CLINIC | Age: 70
End: 2025-03-21
Payer: COMMERCIAL

## 2025-03-21 VITALS
WEIGHT: 190.26 LBS | BODY MASS INDEX: 35.92 KG/M2 | RESPIRATION RATE: 19 BRPM | DIASTOLIC BLOOD PRESSURE: 80 MMHG | HEIGHT: 61 IN | TEMPERATURE: 97 F | SYSTOLIC BLOOD PRESSURE: 149 MMHG | HEART RATE: 72 BPM | OXYGEN SATURATION: 98 %

## 2025-03-21 LAB
GLUCOSE BLD MANUAL STRIP-MCNC: 132 MG/DL (ref 74–99)
GLUCOSE BLD MANUAL STRIP-MCNC: 137 MG/DL (ref 74–99)
HOLD SPECIMEN: NORMAL

## 2025-03-21 PROCEDURE — 86618 LYME DISEASE ANTIBODY: CPT | Performed by: PSYCHIATRY & NEUROLOGY

## 2025-03-21 PROCEDURE — 2500000001 HC RX 250 WO HCPCS SELF ADMINISTERED DRUGS (ALT 637 FOR MEDICARE OP): Performed by: INTERNAL MEDICINE

## 2025-03-21 PROCEDURE — 2500000004 HC RX 250 GENERAL PHARMACY W/ HCPCS (ALT 636 FOR OP/ED): Performed by: INTERNAL MEDICINE

## 2025-03-21 PROCEDURE — 2500000001 HC RX 250 WO HCPCS SELF ADMINISTERED DRUGS (ALT 637 FOR MEDICARE OP): Performed by: NURSE PRACTITIONER

## 2025-03-21 PROCEDURE — 82947 ASSAY GLUCOSE BLOOD QUANT: CPT

## 2025-03-21 PROCEDURE — 7100000011 HC EXTENDED STAY RECOVERY HOURLY - NURSING UNIT

## 2025-03-21 PROCEDURE — 36415 COLL VENOUS BLD VENIPUNCTURE: CPT | Performed by: PSYCHIATRY & NEUROLOGY

## 2025-03-21 RX ORDER — KETOROLAC TROMETHAMINE 10 MG/1
10 TABLET, FILM COATED ORAL EVERY 8 HOURS PRN
Qty: 30 TABLET | Refills: 1 | Status: SHIPPED | OUTPATIENT
Start: 2025-03-21 | End: 2025-03-31

## 2025-03-21 RX ORDER — PREDNISONE 10 MG/1
60 TABLET ORAL DAILY
Qty: 42 TABLET | Refills: 0 | Status: SHIPPED | OUTPATIENT
Start: 2025-03-21 | End: 2025-03-28

## 2025-03-21 RX ADMIN — ASPIRIN 81 MG: 81 TABLET, CHEWABLE ORAL at 10:00

## 2025-03-21 RX ADMIN — POLYETHYLENE GLYCOL 3350 17 G: 17 POWDER, FOR SOLUTION ORAL at 10:00

## 2025-03-21 RX ADMIN — DOCUSATE SODIUM 100 MG: 100 CAPSULE, LIQUID FILLED ORAL at 10:00

## 2025-03-21 RX ADMIN — OXYCODONE HYDROCHLORIDE 5 MG: 5 TABLET ORAL at 10:36

## 2025-03-21 RX ADMIN — OXYCODONE HYDROCHLORIDE 5 MG: 5 TABLET ORAL at 04:29

## 2025-03-21 RX ADMIN — ACETAMINOPHEN 650 MG: 325 TABLET, FILM COATED ORAL at 11:16

## 2025-03-21 RX ADMIN — ACETAMINOPHEN 650 MG: 325 TABLET, FILM COATED ORAL at 05:30

## 2025-03-21 ASSESSMENT — PAIN SCALES - GENERAL
PAINLEVEL_OUTOF10: 4
PAINLEVEL_OUTOF10: 7
PAINLEVEL_OUTOF10: 6

## 2025-03-21 ASSESSMENT — PAIN DESCRIPTION - LOCATION
LOCATION: ELBOW
LOCATION: ELBOW

## 2025-03-21 ASSESSMENT — PAIN DESCRIPTION - DESCRIPTORS: DESCRIPTORS: ACHING

## 2025-03-21 ASSESSMENT — PAIN DESCRIPTION - ORIENTATION
ORIENTATION: RIGHT
ORIENTATION: RIGHT

## 2025-03-21 ASSESSMENT — PAIN - FUNCTIONAL ASSESSMENT: PAIN_FUNCTIONAL_ASSESSMENT: 0-10

## 2025-03-21 NOTE — TELEPHONE ENCOUNTER
Copied from CRM #[id number]. Topic: Transfer to Department for Scheduling  >> Mar 21, 2025  3:58 PM Yung SANCHEZ wrote:  Patient called regarding needing a 1 week post appointment on 3/26/2025 but nothing is available, patient then requested to speak with office. Please give the patient a call back at your earliest convenience

## 2025-03-21 NOTE — PROGRESS NOTES
"Florina Dlol \"Dionne\" is a 70 y.o. female on day 0 of admission presenting with Elbow arthritis.    POD2 s/p right total elbow arthroplasty with Dr. Flores.     Subjective   Stroke alert called 3/19 evening due to new onset facial droop. CT negative. Temp 100.6 at last check. Reports she is doing well this AM. Denies numbness and tingling in the RUE. Urinating without issue. Denies chest pain and SOB. MRI Brain without acute infarct and neurology consult completed.     Objective     Physical Exam  Vitals and nursing note reviewed.   Constitutional:       Appearance: Normal appearance.   Eyes:      Extraocular Movements: Extraocular movements intact.      Conjunctiva/sclera: Conjunctivae normal.      Pupils: Pupils are equal, round, and reactive to light.   Cardiovascular:      Rate and Rhythm: Normal rate and regular rhythm.   Pulmonary:      Effort: Pulmonary effort is normal.   Neurological:      General: No focal deficit present.      Mental Status: She is alert and oriented to person, place, and time.      Cranial Nerves: No cranial nerve deficit.      Motor: No weakness.       RUE: Splint in place.   Drain with SS output.   Motor intact in median/ulnar/radial/AIN/PIN  SILT in all distributions.   Brisk cap refill in the fingers.     Last Recorded Vitals  Blood pressure 124/70, pulse 88, temperature 36.8 °C (98.3 °F), temperature source Temporal, resp. rate 18, height 1.549 m (5' 1\"), weight 86.3 kg (190 lb 4.1 oz), SpO2 96%, not currently breastfeeding.  Intake/Output last 3 Shifts:  I/O last 3 completed shifts:  In: 1905.2 (22.1 mL/kg) [P.O.:480; I.V.:1425.2 (16.5 mL/kg)]  Out: 430 (5 mL/kg) [Urine:400 (0.1 mL/kg/hr); Blood:30]  Weight: 86.3 kg     Relevant Results      Scheduled medications  acetaminophen, 650 mg, oral, q6h AMARILYS  amLODIPine, 5 mg, oral, Daily  aspirin, 81 mg, oral, Daily  atorvastatin, 40 mg, oral, Nightly  docusate sodium, 100 mg, oral, BID  levothyroxine, 125 mcg, oral, " Daily  polyethylene glycol, 17 g, oral, Daily  rOPINIRole, 0.5 mg, oral, Nightly      Continuous medications       PRN medications  PRN medications: HYDROmorphone, [Held by provider] ibuprofen, labetaloL, magnesium hydroxide, naloxone, ondansetron **OR** ondansetron, oxyCODONE, oxyCODONE, oxygen  Results for orders placed or performed during the hospital encounter of 03/19/25 (from the past 24 hours)   B-Type Natriuretic Peptide   Result Value Ref Range     (H) 0 - 99 pg/mL   Hemoglobin A1C   Result Value Ref Range    Hemoglobin A1C 6.2 (H) See comment %    Estimated Average Glucose 131 Not Established mg/dL   Lipid Panel   Result Value Ref Range    Cholesterol 225 (H) 0 - 199 mg/dL    HDL-Cholesterol 72.1 mg/dL    Cholesterol/HDL Ratio 3.1     LDL Calculated 140 (H) <=99 mg/dL    VLDL 13 0 - 40 mg/dL    Triglycerides 66 0 - 149 mg/dL    Non HDL Cholesterol 153 (H) 0 - 149 mg/dL   CBC   Result Value Ref Range    WBC 11.5 (H) 4.4 - 11.3 x10*3/uL    nRBC 0.0 0.0 - 0.0 /100 WBCs    RBC 4.80 4.00 - 5.20 x10*6/uL    Hemoglobin 14.3 12.0 - 16.0 g/dL    Hematocrit 43.1 36.0 - 46.0 %    MCV 90 80 - 100 fL    MCH 29.8 26.0 - 34.0 pg    MCHC 33.2 32.0 - 36.0 g/dL    RDW 12.8 11.5 - 14.5 %    Platelets 205 150 - 450 x10*3/uL   Basic Metabolic Panel   Result Value Ref Range    Glucose 147 (H) 74 - 99 mg/dL    Sodium 138 136 - 145 mmol/L    Potassium 4.4 3.5 - 5.3 mmol/L    Chloride 104 98 - 107 mmol/L    Bicarbonate 23 21 - 32 mmol/L    Anion Gap 15 10 - 20 mmol/L    Urea Nitrogen 13 6 - 23 mg/dL    Creatinine 0.61 0.50 - 1.05 mg/dL    eGFR >90 >60 mL/min/1.73m*2    Calcium 9.4 8.6 - 10.3 mg/dL   POCT GLUCOSE   Result Value Ref Range    POCT Glucose 161 (H) 74 - 99 mg/dL   POCT GLUCOSE   Result Value Ref Range    POCT Glucose 178 (H) 74 - 99 mg/dL   POCT GLUCOSE   Result Value Ref Range    POCT Glucose 163 (H) 74 - 99 mg/dL   POCT GLUCOSE   Result Value Ref Range    POCT Glucose 176 (H) 74 - 99 mg/dL               XR  right elbow with no acute post-op complications.              Assessment/Plan   Assessment & Plan  Elbow arthritis    Prediabetes    Class 2 obesity in adult    Facial weakness    POD1 s/p right elbow arthroplasty. New onset facial droop on evening of POD0.     Facial droop  Stroke alert called.   CT negative.   MRI pending.   Neurology recommends course of oral steroids    Activity: PT/OT.   Antibiotics: oliver-op ancef  Diet: advance as tolerated  Drain: will remove later this AM.   Dressing: splint to remain until follow up  Pain: multimodal  Weight Bearing: NWB RUE  Dispo: pending clinical course, neuro recs, PT/OT  Follow Up: in 7-10 days for splint removal with [facility].        I spent 15 minutes in the professional and overall care of this patient.      Karri Whitten MD

## 2025-03-21 NOTE — PROGRESS NOTES
03/21/25 0825   Discharge Planning   Home or Post Acute Services None   Expected Discharge Disposition Home   Does the patient need discharge transport arranged? No   Stroke Family Assessment   Stroke Family Assessment Needed No   Intensity of Service   Intensity of Service 0-30 min     Patient is medically cleared for discharge home.  DISP: home  DME: none  O2: none  WOUNDS: surgical  Spouse will transport patient home.  Patient has no homecare needs.  Dunia Lemons RN

## 2025-03-21 NOTE — DISCHARGE INSTRUCTIONS
Orthopaedic Post-op Instructions     Weight bearing status: Non weight bearing right upper extremity in splint     VTE Prophylaxis (Blood Clot Prevention): Aspirin 81 mg daily for 2 weeks     Home Medication: Resume all home medications    Resume normal diet     Call for any questions or concerns.     MEDICATION SIDE EFFECTS.  OXYCODONE: constipation, nausea, vomiting, upset stomach, (sleepiness), dizziness, lightheadedness, itching, headache, blurred vision, dry mouth, sweating  ASPIRIN:  upset stomach, heartburn; drowsiness; or headache    Follow up with Dr. Flores in 2 weeks. Call [phone] to schedule/confirm appointment.

## 2025-03-21 NOTE — DISCHARGE SUMMARY
Discharge Diagnosis  Elbow arthritis    Issues Requiring Follow-Up  S/p R total elbow arthroplasty     Right facial weakness     Test Results Pending At Discharge  Pending Labs       Order Current Status    Extra Tubes In process    LYME (B. BURGDORFERI) AB MODIFIED 2-TITER TESTING, WITH REFLEX TO IGM AND IGG BY ALEYDA In process    Lavender Top In process          Hospital Course    70 year-old F who presented with R elbow OA. Patient is now s/p R total elbow arthroplasty on 3/21 by Dr. Flores. On the day of surgery, patient was identified in the pre-operative holding area and agreeable to proceed with surgery. Written consent was obtained.  Please see operative note for further details of this procedure. Patient received 24 hours of oliver-operative antibiotics. Patient recovered in the PACU before transfer to a regular nursing floor. Patient was started on oxycodone, tylenol, and dilaudid for pain control and ASA for DVT prophylaxis. Physical therapy recommended continued recovery in a home setting for continued physical therapy and wound care. On the day of discharge, patient was afebrile with stable vital signs. Patient was neurovascularly intact at time of discharge. Patient was discharged with prescription of ASA for DVT prophylaxis for 2 weeks. Patient will follow-up with Dr. Flores in 2 weeks for post-operative visit.    Of note the patient was found to have right facial weakness, noticed in the PACU after surgery. Indeterminate if this is new facial weakness or not. Stroke protocol was activated. MRI brain obtained without evidence of actute infarct. Neurology consult placed and recommended course of oral steroids.     Home Medications     Medication List      START taking these medications     ketorolac 10 mg tablet; Commonly known as: Toradol; Take 1 tablet (10   mg) by mouth every 8 hours if needed for moderate pain (4 - 6) for up to   10 days.   omeprazole 40 mg DR capsule; Commonly known as:  PriLOSEC; Take 1 capsule   (40 mg) by mouth once daily in the morning. Take before meals for 3 days.   Do not crush or chew.   oxyCODONE 5 mg immediate release tablet; Commonly known as: Roxicodone;   Take 1 tablet (5 mg) by mouth every 4 hours if needed for severe pain (7 -   10) for up to 6 days.   predniSONE 10 mg tablet; Commonly known as: Deltasone; Take 6 tablets   (60 mg) by mouth once daily for 7 days.     CONTINUE taking these medications     acetaminophen 500 mg tablet; Commonly known as: Tylenol   amLODIPine 5 mg tablet; Commonly known as: Norvasc   b complex vitamins capsule   BERBERINE CHLORIDE ORAL   calcium carbonate 500 mg (200 mg elemental) chewable tablet; Commonly   known as: Tums   calcium carbonate-vitamin D3 600 mg-5 mcg (200 unit) tablet   cetirizine 10 mg tablet; Commonly known as: ZyrTEC   chlorhexidine 0.12 % solution; Commonly known as: Peridex; SWISH AND   SPIT 15ML FOR 30 SECONDS NIGHT PRIOR TO SURGERY AND MORNING OF SURGERY   garlic 1,000 mg capsule   irbesartan-hydrochlorothiazide 300-12.5 mg tablet; Commonly known as:   Avalide   krill oil 500 mg capsule   levothyroxine 125 mcg tablet; Commonly known as: Synthroid, Levoxyl   multivitamin tablet   NON FORMULARY   red yeast rice 600 mg capsule   rOPINIRole 0.5 mg tablet; Commonly known as: Requip     STOP taking these medications     ibuprofen 800 mg tablet       Outpatient Follow-Up  Future Appointments   Date Time Provider Department Center   4/7/2025  1:00 PM NIRMALA Wheeler-CNP [id number]       Karri Whitten MD

## 2025-03-23 LAB — B BURGDOR.VLSE1+PEPC10 AB SER IA-ACNC: 0.2 IV

## 2025-03-24 DIAGNOSIS — Z96.621 HISTORY OF ARTHROPLASTY OF RIGHT ELBOW: Primary | ICD-10-CM

## 2025-03-27 ENCOUNTER — OFFICE VISIT (OUTPATIENT)
Dept: ORTHOPEDIC SURGERY | Facility: HOSPITAL | Age: 70
End: 2025-03-27
Payer: MEDICARE

## 2025-03-27 ENCOUNTER — HOSPITAL ENCOUNTER (OUTPATIENT)
Dept: RADIOLOGY | Facility: HOSPITAL | Age: 70
Discharge: HOME | End: 2025-03-27
Payer: MEDICARE

## 2025-03-27 DIAGNOSIS — M19.021 ARTHRITIS OF ELBOW, RIGHT: ICD-10-CM

## 2025-03-27 DIAGNOSIS — Z96.621 STATUS POST ELBOW JOINT REPLACEMENT, RIGHT: Primary | ICD-10-CM

## 2025-03-27 DIAGNOSIS — Z96.622: ICD-10-CM

## 2025-03-27 PROCEDURE — 1160F RVW MEDS BY RX/DR IN RCRD: CPT | Performed by: NURSE PRACTITIONER

## 2025-03-27 PROCEDURE — 99211 OFF/OP EST MAY X REQ PHY/QHP: CPT | Performed by: NURSE PRACTITIONER

## 2025-03-27 PROCEDURE — L3761 EO, ADJ LOCK JOINT PREFAB OT: HCPCS | Performed by: NURSE PRACTITIONER

## 2025-03-27 PROCEDURE — 1159F MED LIST DOCD IN RCRD: CPT | Performed by: NURSE PRACTITIONER

## 2025-03-27 PROCEDURE — 73080 X-RAY EXAM OF ELBOW: CPT | Mod: RT

## 2025-03-27 NOTE — PROGRESS NOTES
Provider Impression/Assessment:  Florina Doll is a pleasant 70 y.o. female who is returning to clinic for their first postoperative visit. They are 8 days S/P right elbow total arthroplasty, done 3/19/25. She was admitted to the hospital S/P surgery and was discharged on 3/21/25. She feels well today. Splint removed today.      Patient Discussion/Plan:  Florina Doll is doing well since surgery. Thankfully all the imaging done post operative was negative for stroke protocol. Her incision is healing well. Will start with therapy next week for range of motion only. Remain with no lifting over a coffee cup for the next 5 weeks. Talked about 3-4 month total recovery, up through the first 6 months. For the next month she should stay in the elbow brace, locked at 90 flexion and 10 of extension. This brace should be worn at all times. She can come out of the brace to shower and do her daily exercises. Otherwise it should be worn at all times and when sleeping. May extend the elbow as tolerated actively and passively now. No passive movement with flexion passed 90 degrees at this time. Continue with active and passive range of motion of the wrist and hand as tolerated. No lifting over a coffee cup for the next 5 weeks.      Prescription for occupational therapy for active and passive range of motion limitations explained. Theray should start next week. No strengthening until approximately 2 months out from surgery.     We have scheduled to have Florina Doll see us back in 5 weeks for a repeat clinical check and repeat RIGHT ELBOW XR at that time. We will likely progress to more aggressive range of motion and lite strengthening up to 5 pounds at 2 months from surgery.      She will remain limited to no strengthening or weight-bearing for the next 7 weeks. The goal is to return to regular activities at 3-4 months, but limited to no lifting over 10 pounds indefinitely. Can take up to 6 months for a full recovery. She will  continue to work on her left elbow to reduce her nerve pain. We expect this to resolve over time. She verbalizes understanding of this.      All questions were answered today to their satisfaction and they know how to reach our office if needed prior to the next scheduled visit with our office.      Should you have any questions or concerns after your visit today, please do not hesitate to call the office at [phone]. I am honored to be a provider on your health care team and remain dedicated to helping you achieve your healthcare goals     -------------------------------------------------------------------------------------------------  CHIEF COMPLAINT:  POV RIGHT ELBOW REPLACEMENT   DOS: 3/19/25     History of Present Illness:  Florina Doll returns to clinic for their first postoperative visit. They are 8 days S/P right elbow total arthroplasty, done 3/19/25. She was admitted to the hospital S/P surgery and was discharged on 3/21/25. Splint removed today and she was placed in a flexed hinged brace. Suture tails trimmed today. Pain is well managed with toradol and tylenol. Sleeping without difficulty. Denies redness or warmth at incision site. Denies any fever, chills, or paresthesia. They have been compliant with restrictions.     She is s/p left elbow total arthroplasty done 11/18/24. She is feeling well, continues to have some nerve pain on the left side with certain motions. No pain at rest. Continuing to do daily exercises for this as it resolves. She is now lifting up to 10 pounds with her left side.      Review of Systems:   Review of systems for all 14 systems is negative for complaint except for the above noted findings in the history of present illness.     Family, social, and medical histories are obtained and reviewed.     Allergies:  Allergies         Allergies   Allergen Reactions    Clarithromycin Swelling    Sulfa (Sulfonamide Antibiotics) Itching and Rash    Topiramate Rash, Other and  Swelling       Other reaction(s): Other: See Comments   Hands and feet go numb     Hands and feet go numb    Cefazolin Rash    Iodinated Contrast Media Itching and Rash       ITCHED ALL OVER    Morphine Hives, Itching and Rash            Medications:    Current Medications      Current Outpatient Medications:     acetaminophen (Tylenol) 500 mg tablet, Take 2 tablets (1,000 mg) by mouth every 8 hours if needed for mild pain (1 - 3)., Disp: , Rfl:     amLODIPine (Norvasc) 5 mg tablet, Take 1 tablet (5 mg) by mouth once daily., Disp: , Rfl:     b complex vitamins capsule, Take 1 capsule by mouth once daily., Disp: , Rfl:     BERBERINE CHLORIDE ORAL, Take 1,500 mg by mouth once daily., Disp: , Rfl:     calcium carbonate (Tums) 200 mg calcium chewable tablet, Chew 1 tablet (500 mg) 4 times a day as needed for indigestion or heartburn., Disp: , Rfl:     calcium carbonate-vitamin D3 600 mg-5 mcg (200 unit) tablet, Take 2 tablets by mouth once daily., Disp: , Rfl:     cetirizine (ZyrTEC) 10 mg tablet, Take 1 tablet (10 mg) by mouth once daily in the morning. Take before meals., Disp: , Rfl:     garlic 1,000 mg capsule, Take 1 capsule by mouth once daily., Disp: , Rfl:     ibuprofen 800 mg tablet, Take 1 tablet (800 mg) by mouth 3 times a day., Disp: , Rfl:     irbesartan-hydrochlorothiazide (Avalide) 300-12.5 mg tablet, Take 1 tablet by mouth once daily., Disp: , Rfl:     krill oil 500 mg capsule, Take 1 capsule (500 mg) by mouth once daily., Disp: , Rfl:     levothyroxine (Synthroid, Levoxyl) 125 mcg tablet, Take 1 tablet (125 mcg) by mouth early in the morning.., Disp: , Rfl:     multivitamin tablet, Take 1 tablet by mouth once daily., Disp: , Rfl:     NON FORMULARY, Take 2 each by mouth once daily. MORINGA, Disp: , Rfl:     red yeast rice 600 mg capsule, Take 2 capsules by mouth once daily., Disp: , Rfl:     rOPINIRole (Requip) 0.5 mg tablet, Take 1 tablet (0.5 mg) by mouth once daily at bedtime., Disp: , Rfl:          Diagnoses/Problems:  Bilateral elbow arthritis     Physical Examination:  Patient RIGHT elbow incision is dry, intact and healing well. Drain incision is well healed. Minimal swelling and ecchymosis around elbow. No erythema or warmth. Neurovascularly intact. No pain with flexion and extension of wrist and thumb. Minimal pain with active flexion/extension of elbow about 20-90 degrees. Pronation/Supination of 80 degrees. Suture tails were cut today.     Results/Imaging:  Excellent alignment today of the cemented RIGHT elbow replacement. I personally spent time viewing digital images of the radiology testing with the patient.       *While intending to generate a timely document that accurately reflects the content of the visit, no guarantee can be provided that every grammatical or spelling mistake has been or will be identified or corrected. Thank you for your understanding.

## 2025-04-07 ENCOUNTER — APPOINTMENT (OUTPATIENT)
Dept: ORTHOPEDIC SURGERY | Facility: CLINIC | Age: 70
End: 2025-04-07
Payer: MEDICARE

## 2025-04-25 DIAGNOSIS — Z96.621 STATUS POST ELBOW JOINT REPLACEMENT, RIGHT: Primary | ICD-10-CM

## 2025-04-28 ENCOUNTER — HOSPITAL ENCOUNTER (OUTPATIENT)
Dept: RADIOLOGY | Facility: CLINIC | Age: 70
Discharge: HOME | End: 2025-04-28
Payer: MEDICARE

## 2025-04-28 ENCOUNTER — APPOINTMENT (OUTPATIENT)
Dept: ORTHOPEDIC SURGERY | Facility: CLINIC | Age: 70
End: 2025-04-28
Payer: MEDICARE

## 2025-04-28 DIAGNOSIS — Z96.622: ICD-10-CM

## 2025-04-28 DIAGNOSIS — Z96.621 STATUS POST ELBOW JOINT REPLACEMENT, RIGHT: Primary | ICD-10-CM

## 2025-04-28 PROCEDURE — 1036F TOBACCO NON-USER: CPT | Performed by: NURSE PRACTITIONER

## 2025-04-28 PROCEDURE — 99024 POSTOP FOLLOW-UP VISIT: CPT | Performed by: NURSE PRACTITIONER

## 2025-04-28 PROCEDURE — 73080 X-RAY EXAM OF ELBOW: CPT | Mod: RT

## 2025-04-28 PROCEDURE — 1160F RVW MEDS BY RX/DR IN RCRD: CPT | Performed by: NURSE PRACTITIONER

## 2025-04-28 PROCEDURE — 73080 X-RAY EXAM OF ELBOW: CPT | Mod: RIGHT SIDE | Performed by: RADIOLOGY

## 2025-04-28 PROCEDURE — 1159F MED LIST DOCD IN RCRD: CPT | Performed by: NURSE PRACTITIONER

## 2025-04-28 NOTE — PROGRESS NOTES
Provider Impression/Assessment:  Florina Doll is a pleasant 70 y.o. female who is returning to clinic for a postoperative visit. They are 6 weeks S/P right elbow total arthroplasty, done 3/19/25.      Patient Discussion/Plan:  She no longer needs to wear the elbow brace on right side. Prescription for occupational therapy given today. Progress to more aggressive range of motion and lite strengthening up to 5 pounds on the right side once at 2 months from surgery. She will also continue with OT for her left elbow, 10 pounds now on left only. She will work on neuropathy symptoms on left as well. Again the goal is to return to regular activities at 3-4 months, but limited to no lifting over 10 pounds indefinitely. Can take up to 6 months for a full recovery.      We have scheduled to have Florina Doll see us back in 6 weeks for a repeat clinical check and repeat RIGHT BILATERAL XRAYS at that time. She will continue to work on her left elbow to reduce her nerve pain. We expect this to resolve over time. She verbalizes understanding of this.      All questions were answered today to their satisfaction and they know how to reach our office if needed prior to the next scheduled visit with our office.      Should you have any questions or concerns after your visit today, please do not hesitate to call the office at [phone]. I am honored to be a provider on your health care team and remain dedicated to helping you achieve your healthcare goals     -------------------------------------------------------------------------------------------------  CHIEF COMPLAINT:  POV RIGHT ELBOW REPLACEMENT   DOS: 3/19/25     History of Present Illness:  Florina Doll returns to clinic for their first postoperative visit. They are 8 days S/P right elbow total arthroplasty, done 3/19/25. She was admitted to the hospital S/P surgery and was discharged on 3/21/25. Splint removed today and she was placed in a flexed hinged brace. Suture  tails trimmed today. Pain is well managed with toradol and tylenol. Sleeping without difficulty. Denies redness or warmth at incision site. Denies any fever, chills, or paresthesia. They have been compliant with restrictions.      She is s/p left elbow total arthroplasty done 11/18/24. She is feeling well, continues to have some nerve pain on the left side with certain motions. No pain at rest. Continuing to do daily exercises for this as it resolves. She is now lifting up to 10 pounds with her left side.     4/28/25  Florina Doll is a pleasant 70 y.o. female who is returning to clinic for their second post op visit. Right elbow total arthroplasty, done 3/19/25. She is feeling fine today 0/10 pain. Wearing elbow brace today. Denies any warmth of redness with her incision. No drainage. She will take Ibuprofen and tylenol for overall body arthritic pain. She has been working with OT for both of her elbows at this point. She is not pushing off or down yet with right side. Nerve pain is reducing slowly on her left side, as expected. She will follow up with us in 6 weeks for her right elbow.     Review of Systems:   Review of systems for all 14 systems is negative for complaint except for the above noted findings in the history of present illness.     Family, social, and medical histories are obtained and reviewed.     Allergies:  Allergies             Allergies   Allergen Reactions    Clarithromycin Swelling    Sulfa (Sulfonamide Antibiotics) Itching and Rash    Topiramate Rash, Other and Swelling       Other reaction(s): Other: See Comments   Hands and feet go numb     Hands and feet go numb    Cefazolin Rash    Iodinated Contrast Media Itching and Rash       ITCHED ALL OVER    Morphine Hives, Itching and Rash            Medications:     Current Medications      Current Outpatient Medications:     acetaminophen (Tylenol) 500 mg tablet, Take 2 tablets (1,000 mg) by mouth every 8 hours if needed for mild pain (1 - 3).,  Disp: , Rfl:     amLODIPine (Norvasc) 5 mg tablet, Take 1 tablet (5 mg) by mouth once daily., Disp: , Rfl:     b complex vitamins capsule, Take 1 capsule by mouth once daily., Disp: , Rfl:     BERBERINE CHLORIDE ORAL, Take 1,500 mg by mouth once daily., Disp: , Rfl:     calcium carbonate (Tums) 200 mg calcium chewable tablet, Chew 1 tablet (500 mg) 4 times a day as needed for indigestion or heartburn., Disp: , Rfl:     calcium carbonate-vitamin D3 600 mg-5 mcg (200 unit) tablet, Take 2 tablets by mouth once daily., Disp: , Rfl:     cetirizine (ZyrTEC) 10 mg tablet, Take 1 tablet (10 mg) by mouth once daily in the morning. Take before meals., Disp: , Rfl:     garlic 1,000 mg capsule, Take 1 capsule by mouth once daily., Disp: , Rfl:     ibuprofen 800 mg tablet, Take 1 tablet (800 mg) by mouth 3 times a day., Disp: , Rfl:     irbesartan-hydrochlorothiazide (Avalide) 300-12.5 mg tablet, Take 1 tablet by mouth once daily., Disp: , Rfl:     krill oil 500 mg capsule, Take 1 capsule (500 mg) by mouth once daily., Disp: , Rfl:     levothyroxine (Synthroid, Levoxyl) 125 mcg tablet, Take 1 tablet (125 mcg) by mouth early in the morning.., Disp: , Rfl:     multivitamin tablet, Take 1 tablet by mouth once daily., Disp: , Rfl:     NON FORMULARY, Take 2 each by mouth once daily. MORINGA, Disp: , Rfl:     red yeast rice 600 mg capsule, Take 2 capsules by mouth once daily., Disp: , Rfl:     rOPINIRole (Requip) 0.5 mg tablet, Take 1 tablet (0.5 mg) by mouth once daily at bedtime., Disp: , Rfl:          Diagnoses/Problems:  Bilateral elbow arthritis     Physical Examination:  Patient RIGHT elbow incision is dry, intact and healing well. Drain incision is well healed. No swelling. No ecchymosis. No erythema or warmth. No drainage. Neurovascularly intact. No pain with flexion and extension of wrist and thumb. Minimal pain with active flexion/extension of elbow about  degrees. Pronation/Supination of 80 degrees.       Results/Imaging:  Excellent alignment today (4/28/25) of the cemented RIGHT elbow replacement. I personally spent time viewing digital images of the radiology testing with the patient.       *While intending to generate a timely document that accurately reflects the content of the visit, no guarantee can be provided that every grammatical or spelling mistake has been or will be identified or corrected. Thank you for your understanding.

## 2025-05-19 ENCOUNTER — APPOINTMENT (OUTPATIENT)
Dept: RADIOLOGY | Facility: CLINIC | Age: 70
End: 2025-05-19
Payer: MEDICARE

## 2025-05-19 DIAGNOSIS — E78.2 MIXED HYPERLIPIDEMIA: ICD-10-CM

## 2025-05-19 PROCEDURE — 75571 CT HRT W/O DYE W/CA TEST: CPT

## 2025-06-03 ENCOUNTER — HOSPITAL ENCOUNTER (OUTPATIENT)
Dept: RADIOLOGY | Facility: HOSPITAL | Age: 70
Discharge: HOME | End: 2025-06-03
Payer: MEDICARE

## 2025-06-03 ENCOUNTER — OFFICE VISIT (OUTPATIENT)
Dept: ORTHOPEDIC SURGERY | Facility: HOSPITAL | Age: 70
End: 2025-06-03
Payer: MEDICARE

## 2025-06-03 DIAGNOSIS — Z96.622: ICD-10-CM

## 2025-06-03 DIAGNOSIS — G56.92 NEUROPATHY OF LEFT UPPER EXTREMITY: ICD-10-CM

## 2025-06-03 DIAGNOSIS — Z96.621 STATUS POST ELBOW JOINT REPLACEMENT, RIGHT: Primary | ICD-10-CM

## 2025-06-03 DIAGNOSIS — Z96.621 STATUS POST ELBOW JOINT REPLACEMENT, RIGHT: ICD-10-CM

## 2025-06-03 PROCEDURE — 73080 X-RAY EXAM OF ELBOW: CPT | Mod: LT

## 2025-06-03 PROCEDURE — 73080 X-RAY EXAM OF ELBOW: CPT | Mod: RT

## 2025-06-03 PROCEDURE — 73080 X-RAY EXAM OF ELBOW: CPT | Mod: RIGHT SIDE | Performed by: RADIOLOGY

## 2025-06-03 PROCEDURE — 73080 X-RAY EXAM OF ELBOW: CPT | Mod: LEFT SIDE | Performed by: RADIOLOGY

## 2025-06-03 PROCEDURE — 99213 OFFICE O/P EST LOW 20 MIN: CPT | Performed by: NURSE PRACTITIONER

## 2025-06-03 ASSESSMENT — PAIN DESCRIPTION - DESCRIPTORS: DESCRIPTORS: BURNING

## 2025-06-03 ASSESSMENT — PAIN SCALES - GENERAL: PAINLEVEL_OUTOF10: 4

## 2025-06-03 ASSESSMENT — PAIN - FUNCTIONAL ASSESSMENT: PAIN_FUNCTIONAL_ASSESSMENT: 0-10

## 2025-06-03 NOTE — PROGRESS NOTES
Provider Impression/Assessment:  Florina Doll is a pleasant 70 y.o. female who is returning to clinic for a postoperative visit. She is 10 weeks S/P right elbow total arthroplasty, done 3/19/25. She is also +6 months left elbow arthroplasty, done 11/18/24 with left elbow pain and neuropathy symptoms.     Patient Discussion/Plan:  Prescription for occupational therapy given today. Progress to more aggressive range of motion and lite strengthening up to 5-10 pounds on the right side now that she is over 2 months from surgery. She will also continue with OT for her left elbow, no more than 2-3 pounds now until her pain reduces. She will continue to work on neuropathy symptoms on left as well. Again the goal is to return to regular activities at 3-4 months, but limited to no lifting over 10 pounds indefinitely for both elbows. Can take up to 6 months for a full recovery.       At this point since she is now over 6 months from left elbow replacement with continued, unresolved pain, we will order a left upper extremity EMG to test her nerves and ordered a left elbow CT scan with metal subtraction to better assess her arthroplasty.     We have scheduled to have Florina Doll see Dr Flores back in 4 weeks for a repeat clinical check and review of imaging and testing at that visit.       All questions were answered today to their satisfaction and they know how to reach our office if needed prior to the next scheduled visit with our office. She may return sooner as needed.      Should you have any questions or concerns after your visit today, please do not hesitate to call the office at [phone]. I am honored to be a provider on your health care team and remain dedicated to helping you achieve your healthcare goals     -------------------------------------------------------------------------------------------------  CHIEF COMPLAINT:  POV RIGHT ELBOW REPLACEMENT   DOS: 3/19/25     History of Present Illness:  Florina  Lavon returns to clinic for their first postoperative visit. They are 8 days S/P right elbow total arthroplasty, done 3/19/25. She was admitted to the hospital S/P surgery and was discharged on 3/21/25. Splint removed today and she was placed in a flexed hinged brace. Suture tails trimmed today. Pain is well managed with toradol and tylenol. Sleeping without difficulty. Denies redness or warmth at incision site. Denies any fever, chills, or paresthesia. They have been compliant with restrictions.      She is s/p left elbow total arthroplasty done 11/18/24. She is feeling well, continues to have some nerve pain on the left side with certain motions. No pain at rest. Continuing to do daily exercises for this as it resolves. She is now lifting up to 10 pounds with her left side.      4/28/25  Florina Doll is a pleasant 70 y.o. female who is returning to clinic for their second post op visit. Right elbow total arthroplasty, done 3/19/25. She is feeling fine today 0/10 pain. Wearing elbow brace today. Denies any warmth of redness with her incision. No drainage. She will take Ibuprofen and tylenol for overall body arthritic pain. She has been working with OT for both of her elbows at this point. She is not pushing off or down yet with right side. Nerve pain is reducing slowly on her left side, as expected. She will follow up with us in 6 weeks for her right elbow.    6/3/25  Patient returns about 3 months S/P right elbow total arthroplasty, done 3/19/25.  She is progressing nicely with her right elbow at this time. She has been working with OT for range of motion only for the right elbow. Her incision is healing nicely. She has no complaints of pain or numbness with the right elbow. She is also s/p +6 months left elbow total arthroplasty. She reports she continues to have nerve pain that radiates from her elbow down her forearm. Her range of motion is fairly good on this side and her incision is well healed. She denies  any redness or warmth over either elbow. She notes that her progress has been slower for her left than her right at this point. She would like to continue with OT and strengthening. She does report doing some gardening at her house over the past few weeks which increased her pain a bit. Bilateral elbow exrays were taken and reviewed today. She has been working with OT for 3x week.      Review of Systems:   Review of systems for all 14 systems is negative for complaint except for the above noted findings in the history of present illness.     Family, social, and medical histories are obtained and reviewed.     Allergies:  Allergies             Allergies   Allergen Reactions    Clarithromycin Swelling    Sulfa (Sulfonamide Antibiotics) Itching and Rash    Topiramate Rash, Other and Swelling       Other reaction(s): Other: See Comments   Hands and feet go numb     Hands and feet go numb    Cefazolin Rash    Iodinated Contrast Media Itching and Rash       ITCHED ALL OVER    Morphine Hives, Itching and Rash            Medications:     Current Medications      Current Outpatient Medications:     acetaminophen (Tylenol) 500 mg tablet, Take 2 tablets (1,000 mg) by mouth every 8 hours if needed for mild pain (1 - 3)., Disp: , Rfl:     amLODIPine (Norvasc) 5 mg tablet, Take 1 tablet (5 mg) by mouth once daily., Disp: , Rfl:     b complex vitamins capsule, Take 1 capsule by mouth once daily., Disp: , Rfl:     BERBERINE CHLORIDE ORAL, Take 1,500 mg by mouth once daily., Disp: , Rfl:     calcium carbonate (Tums) 200 mg calcium chewable tablet, Chew 1 tablet (500 mg) 4 times a day as needed for indigestion or heartburn., Disp: , Rfl:     calcium carbonate-vitamin D3 600 mg-5 mcg (200 unit) tablet, Take 2 tablets by mouth once daily., Disp: , Rfl:     cetirizine (ZyrTEC) 10 mg tablet, Take 1 tablet (10 mg) by mouth once daily in the morning. Take before meals., Disp: , Rfl:     garlic 1,000 mg capsule, Take 1 capsule by mouth once  daily., Disp: , Rfl:     ibuprofen 800 mg tablet, Take 1 tablet (800 mg) by mouth 3 times a day., Disp: , Rfl:     irbesartan-hydrochlorothiazide (Avalide) 300-12.5 mg tablet, Take 1 tablet by mouth once daily., Disp: , Rfl:     krill oil 500 mg capsule, Take 1 capsule (500 mg) by mouth once daily., Disp: , Rfl:     levothyroxine (Synthroid, Levoxyl) 125 mcg tablet, Take 1 tablet (125 mcg) by mouth early in the morning.., Disp: , Rfl:     multivitamin tablet, Take 1 tablet by mouth once daily., Disp: , Rfl:     NON FORMULARY, Take 2 each by mouth once daily. MORINGA, Disp: , Rfl:     red yeast rice 600 mg capsule, Take 2 capsules by mouth once daily., Disp: , Rfl:     rOPINIRole (Requip) 0.5 mg tablet, Take 1 tablet (0.5 mg) by mouth once daily at bedtime., Disp: , Rfl:          Diagnoses/Problems:  Bilateral elbow arthritis  Left elbow pain     Physical Examination:  Patient BILATERAL elbow incisions are dry, intact and healing well. No ecchymosis. No erythema or warmth. No drainage. Neurovascularly intact. No pain with flexion and extension of wrist and thumb. Minimal pain with active flexion/extension of elbow about  degrees. Pronation/Supination of 80 degrees. Left elbow palpable tenderness over olecranon.      Results/Imaging:  Excellent alignment today (4/28/25) of the cemented RIGHT elbow replacement. I personally spent time viewing digital images of the radiology testing with the patient.      Bilateral elbow xrays today (6/3/25) of cemented elbow replacements personally reviewed with patient today. Right elbow alignment excellent today of cemented total elbow replacement. Left cemented elbow replacement stable, without acute findings since last imaging. Mild edema     *While intending to generate a timely document that accurately reflects the content of the visit, no guarantee can be provided that every grammatical or spelling mistake has been or will be identified or corrected. Thank you for your  understanding.

## 2025-06-04 ENCOUNTER — HOSPITAL ENCOUNTER (OUTPATIENT)
Dept: RADIOLOGY | Facility: CLINIC | Age: 70
Discharge: HOME | End: 2025-06-04
Payer: MEDICARE

## 2025-06-04 DIAGNOSIS — Z96.622: ICD-10-CM

## 2025-06-04 PROCEDURE — 73200 CT UPPER EXTREMITY W/O DYE: CPT | Mod: LT

## 2025-06-20 ENCOUNTER — HOSPITAL ENCOUNTER (OUTPATIENT)
Dept: NEUROLOGY | Facility: CLINIC | Age: 70
Discharge: HOME | End: 2025-06-20
Payer: MEDICARE

## 2025-06-20 DIAGNOSIS — G56.92 NEUROPATHY OF LEFT UPPER EXTREMITY: ICD-10-CM

## 2025-06-22 NOTE — PROGRESS NOTES
Subjective   Patient ID: Florina Doll is a 70 y.o. female    Chief Complaint:   Chief Complaint   Patient presents with    Left Elbow - Follow-up     Surgery options        Last Surgery: Right Elbow Total Arthroplasty - Right  Date of Last Surgery: 3/19/2025    HPI  Florina Doll is a 70 y.o. right hand dominant female presenting for bilateral elbow arthritis right > left.     She was last seen by Irma at the beginning of the month. She was provided a prescription for occupational therapy. She does have numbness and tingling. Injections have somewhat helped.     She does have inflammatory arthritis in other joints. She has had bilateral knee replacements.     11/25/24  Florina Doll is a 70 y.o. female returning to the clinic for a follow up visit regarding her left elbow. She is s/p left elbow total arthroplasty done 11/18/24.    She is feeling well.     01/03/25  Florina Doll is a 70 y.o. female returning to the clinic for a follow up visit regarding her left elbow.     She is doing well. Her pain is well controlled and she has been compliant with physical therapy.    6/24/25  Florina Doll is a 70 y.o. female returning to the clinic for a follow up visit regarding her left elbow    She presents today to review her CT scan. She is reporting some grinding in her elbow. She continues to have pain despite continuing to follow up with occupational therapy.     Objective   Patient is a well-developed, well-nourished female in no acute distress.  Breathes with normal chest rises.  Pupils are round and symmetric today.  Awake, alert, and oriented x3.     Patient BILATERAL elbow incisions are dry, intact and healing well. No ecchymosis. No erythema or warmth. No drainage. Neurovascularly intact. No pain with flexion and extension of wrist and thumb. Minimal pain with active flexion/extension of elbow about  degrees. Pronation/Supination of 80 degrees. Left elbow palpable tenderness over olecranon.      Imaging:  CT scan of the LEFT elbow taken 06/04/25 obtained and personally interpreted by me show some lucency around the ulnar component     X-rays of the left elbow taken today personally ordered and interpreted by me show excellent alignment of reverse elbow replacement     Assessment/Plan   No diagnosis found.  Patient 7 months s/p left total elbow arthroplasty; right elbow total arthroplasty, done 3/19/25     We discussed a revision to her left elbow as she continues to have difficulties. We are going to get her scheduled for a revision surgery as the ulnar component of her implant has some lucency.    PLAN for REVISION to left total elbow arthroplasty    No orders of the defined types were placed in this encounter.    Follow up will be scheduled appropriately.     Scribe Attestation  By signing my name below, I, Shikha Traore   attest that this documentation has been prepared under the direction and in the presence of Michael Flores MD.

## 2025-06-24 ENCOUNTER — OFFICE VISIT (OUTPATIENT)
Dept: ORTHOPEDIC SURGERY | Facility: HOSPITAL | Age: 70
End: 2025-06-24
Payer: MEDICARE

## 2025-06-24 DIAGNOSIS — Z96.622: Primary | ICD-10-CM

## 2025-06-24 PROCEDURE — 99212 OFFICE O/P EST SF 10 MIN: CPT | Performed by: ORTHOPAEDIC SURGERY

## 2025-06-24 PROCEDURE — 1125F AMNT PAIN NOTED PAIN PRSNT: CPT | Performed by: ORTHOPAEDIC SURGERY

## 2025-06-24 PROCEDURE — 99214 OFFICE O/P EST MOD 30 MIN: CPT | Performed by: ORTHOPAEDIC SURGERY

## 2025-06-24 ASSESSMENT — PAIN SCALES - GENERAL: PAINLEVEL_OUTOF10: 4

## 2025-06-24 ASSESSMENT — PAIN - FUNCTIONAL ASSESSMENT: PAIN_FUNCTIONAL_ASSESSMENT: 0-10

## 2025-06-25 ENCOUNTER — PATIENT MESSAGE (OUTPATIENT)
Dept: ORTHOPEDIC SURGERY | Facility: HOSPITAL | Age: 70
End: 2025-06-25
Payer: MEDICARE

## 2025-06-25 DIAGNOSIS — M19.022 ARTHRITIS OF ELBOW, LEFT: Primary | ICD-10-CM

## 2025-07-16 ENCOUNTER — CLINICAL SUPPORT (OUTPATIENT)
Dept: PREADMISSION TESTING | Facility: HOSPITAL | Age: 70
End: 2025-07-16
Payer: MEDICARE

## 2025-07-16 NOTE — CPM/PAT NURSE NOTE
"[facility]/PAT Nurse Note      Name: Florina Doll (Florina Doll \"Dionne\")  /Age: 1955/70 y.o.       Medical History[1]    Surgical History[2]    Patient  reports that she is not currently sexually active and has had partner(s) who are male. She reports using the following method of birth control/protection: Post-menopausal.    Family History[3]    Allergies[4]    Prior to Admission medications   Medication Sig Start Date End Date Taking? Authorizing Provider   acetaminophen (Tylenol) 500 mg tablet Take 2 tablets (1,000 mg) by mouth every 8 hours if needed for mild pain (1 - 3).    Historical Provider, MD   amLODIPine (Norvasc) 5 mg tablet Take 1 tablet (5 mg) by mouth once daily. 10/4/24   Historical Provider, MD   b complex vitamins capsule Take 1 capsule by mouth once daily.    Historical Provider, MD   BERBERINE CHLORIDE ORAL Take 1,500 mg by mouth once daily.    Historical Provider, MD   calcium carbonate (Tums) 200 mg calcium chewable tablet Chew 1 tablet (500 mg) 4 times a day as needed for indigestion or heartburn.    Historical Provider, MD   calcium carbonate-vitamin D3 600 mg-5 mcg (200 unit) tablet Take 2 tablets by mouth once daily.    Historical Provider, MD   cetirizine (ZyrTEC) 10 mg tablet Take 1 tablet (10 mg) by mouth once daily in the morning. Take before meals.    Historical Provider, MD   garlic 1,000 mg capsule Take 1 capsule by mouth once daily.    Historical Provider, MD   irbesartan-hydrochlorothiazide (Avalide) 300-12.5 mg tablet Take 1 tablet by mouth once daily. 10/4/24 10/4/25  Historical Provider, MD   krill oil 500 mg capsule Take 1 capsule (500 mg) by mouth once daily.    Historical Provider, MD   levothyroxine (Synthroid, Levoxyl) 125 mcg tablet Take 1 tablet (125 mcg) by mouth early in the morning.. 24   Historical Provider, MD   multivitamin tablet Take 1 tablet by mouth once daily.    Historical Provider, MD   NON FORMULARY Take 2 each by mouth once daily. MORINGA    " Historical Provider, MD   omeprazole (PriLOSEC) 40 mg DR capsule Take 1 capsule (40 mg) by mouth once daily in the morning. Take before meals for 3 days. Do not crush or chew. 3/19/25 3/22/25  Irma Claros APRN-CNP   red yeast rice 600 mg capsule Take 2 capsules by mouth once daily.    Historical Provider, MD   rOPINIRole (Requip) 0.5 mg tablet Take 1 tablet (0.5 mg) by mouth once daily at bedtime.    Historical Provider, MD REYEZ ROS     DASI Risk Score      Flowsheet Row Questionnaire Series Submission from 1/6/2025 in [facility] with Generic Provider Edu   Can you take care of yourself (eat, dress, bathe, or use toilet)?  2.75  filed at 01/06/2025 1947   Can you walk indoors, such as around your house? 1.75  filed at 01/06/2025 1947   Can you walk a block or two on level ground?  2.75  filed at 01/06/2025 1947   Can you climb a flight of stairs or walk up a hill? 5.5  filed at 01/06/2025 1947   Can you run a short distance? 0  filed at 01/06/2025 1947   Can you do light work around the house like dusting or washing dishes? 2.7  filed at 01/06/2025 1947   Can you do moderate work around the house like vacuuming, sweeping floors or carrying groceries? 3.5  filed at 01/06/2025 1947   Can you do heavy work around the house like scrubbing floors or lifting and moving heavy furniture?  0  filed at 01/06/2025 1947   Can you do yard work like raking leaves, weeding or pushing a mower? 0  filed at 01/06/2025 1947   Can you have sexual relations? 5.25  filed at 01/06/2025 1947   Can you participate in moderate recreational activities like golf, bowling, dancing, doubles tennis or throwing a baseball or football? 0  filed at 01/06/2025 1947   Can you participate in strenous sports like swimming, singles tennis, football, basketball, or skiing? 0  filed at 01/06/2025 1947   DASI SCORE 24.2  filed at 01/06/2025 1947   METS Score (Will be calculated only when all the questions are answered) 5.7  filed at  01/06/2025 1947     Capjay jayi DVT Assessment      Flowsheet Row Admission (Discharged) from 3/19/2025 in [facility] with Michael Flores MD Admission (Discharged) from 11/18/2024 in [facility] with Michael Flores MD   DVT Score (IF A SCORE IS NOT CALCULATING, MUST SELECT A BMI TO COMPLETE) 8 filed at 03/19/2025 0932 6 filed at 11/18/2024 0727   Surgical Factors Major surgery planned, lasting 2-3 hours filed at 03/19/2025 0932 Major surgery planned, lasting 2-3 hours filed at 11/18/2024 0727   BMI (BMI MUST BE CHOSEN) 31-40 (Obesity) filed at 03/19/2025 0932 30 or less filed at 11/18/2024 0727     Modified Frailty Index    No data to display  [id number] Stroke Risk Points  Current as of just now        N/A 0 to 9 Points:      Last Change: N/A          The [id number] risk score (Lip GH, et al. 2009. © 2010 American [employer]) quantifies the risk of stroke for a patient with atrial fibrillation. For patients without atrial fibrillation or under the age of 18 this score appears as N/A. Higher score values generally indicate higher risk of stroke.        This score is not applicable to this patient. Components are not calculated.          Revised Cardiac Risk Index    No data to display  Apfel Simplified Score    No data to display  Risk Analysis Index Results This Encounter    No data found in the last 10 encounters.       Stop Bang Score      Flowsheet Row Questionnaire Series Submission from 1/6/2025 in [facility] with Generic Provider Edu   Do you snore loudly? 0  filed at 01/06/2025 1947   Do you often feel tired or fatigued after your sleep? 0  filed at 01/06/2025 1947   Has anyone ever observed you stop breathing in your sleep? 0  filed at 01/06/2025 1947   Do you have or are you being treated for high blood pressure? 1  filed at 01/06/2025 1947   Recent BMI (Calculated) 35.7  filed at 01/06/2025 1947   Is BMI greater than 35 kg/m2?  1=Yes  filed at 01/06/2025 1947   Age older than 50 years old? 1=Yes  filed at 01/06/2025 1947   Gender - Male 0=No  filed at 01/06/2025 1947     Prodigy: High Risk  Total Score: 8              Prodigy Age Score           ARISCAT Score for Postoperative Pulmonary Complications    No data to display  Cherry Perioperative Risk for Myocardial Infarction or Cardiac Arrest (SOUMYA)    No data to display      Nurse Plan of Action:     Med only RN screening call complete.  Reviewed allergies, medications and pharmacy.  Instructed patient to stop vitamins/supplements/NSAIDs today.             [1]   Past Medical History:  Diagnosis Date    Arthritis     Arthritis of elbow, left     s/p Left Elbow Total Arthroplasty 11/18/2024    Arthritis of elbow, right     Plan: Right Elbow Total Arthroplasty 3/19/2025    Cleft lip and cleft palate ([facility])     Diverticulosis     DVT (deep venous thrombosis) (Multi)     Right poipliteal, after TKA    Fractures 1994    Triple malear fx L ankle    Hearing aid worn     Right ear only    HL (hearing loss)     Hyperlipidemia     Hypertension     Hypothyroidism     Obesity     PONV (postoperative nausea and vomiting)     Primary osteoarthritis, right ankle and foot     s/p ARTHRODESIS MIDTARSAL OR TARSOMETATARSAL, MULTIPLE OR TRANSVERSE   Right 8/18/23    RLS (restless legs syndrome)    [2]   Past Surgical History:  Procedure Laterality Date    ADENOIDECTOMY      ANKLE FRACTURE SURGERY  1994    APPENDECTOMY  1990    CHOLECYSTECTOMY  1999    COLON SURGERY  1990    resection for diverticulitis    COLONOSCOPY      DILATION AND CURETTAGE OF UTERUS  1989 1980, 1986 lysis of adhesions    ELBOW ARTHROPLASTY Left 11/18/2024    ELBOW FUSION Left 1978    EXPLORATORY LAPAROTOMY  1981, 1990    FOOT FUSION Right 08/2023    ARTHRODESIS MIDTARSAL OR TARSOMETATARSAL, MULTIPLE OR TRANSVERSE    FOOT FUSION Right 12/2023    REVISION    FOOT SURGERY Bilateral     spherical implants    HARDWARE REMOVAL FOOT / ANKLE  Left 1995    HARDWARE REMOVAL FOOT / ANKLE  12/12/2023    REMOVAL BURIED HARDWAR    KNEE ARTHROPLASTY      Both knees    LASIK Bilateral 1999    MOUTH SURGERY      multiple surgeries on cleft lip and palate    ORIF ANKLE FRACTURE Left 1994    RHINOPLASTY      SEPTOPLASTY  1975    SINUS SURGERY      1975    TONSILLECTOMY      TOTAL KNEE ARTHROPLASTY Right 2019    TOTAL KNEE ARTHROPLASTY Left     TYMPANOSTOMY TUBE PLACEMENT      WRIST SURGERY Right 2005   [3]   Family History  Problem Relation Name Age of Onset    Depression Mother Kathi     Hyperlipidemia Father Dad     Hypertension Father Dad     Accidental death Brother Cosmo         MVA    Other (other) Brother          septic shock post op    Stroke Paternal Grandfather [facility]     Arthritis Paternal Grandmother Kareen Frausto     Heart disease Paternal Grandmother [facility]     Heart disease Brother Hayden     Stroke Maternal Grandfather [facility]    [4]   Allergies  Allergen Reactions    Clarithromycin Swelling    Sulfa (Sulfonamide Antibiotics) Itching and Rash    Topiramate Swelling, Rash and Other     Hands and feet go numb    Cefazolin Rash    Iodinated Contrast Media Itching and Rash     ITCHED ALL OVER    Morphine Hives and Itching

## 2025-07-17 ENCOUNTER — PRE-ADMISSION TESTING (OUTPATIENT)
Dept: PREADMISSION TESTING | Facility: HOSPITAL | Age: 70
End: 2025-07-17
Payer: MEDICARE

## 2025-07-17 VITALS
HEIGHT: 61 IN | BODY MASS INDEX: 35.8 KG/M2 | HEART RATE: 88 BPM | WEIGHT: 189.6 LBS | DIASTOLIC BLOOD PRESSURE: 84 MMHG | SYSTOLIC BLOOD PRESSURE: 150 MMHG | OXYGEN SATURATION: 97 % | RESPIRATION RATE: 16 BRPM | TEMPERATURE: 98.5 F

## 2025-07-17 DIAGNOSIS — M19.022 ARTHRITIS OF LEFT ELBOW: Primary | ICD-10-CM

## 2025-07-17 PROCEDURE — 99214 OFFICE O/P EST MOD 30 MIN: CPT | Performed by: NURSE PRACTITIONER

## 2025-07-17 PROCEDURE — 87081 CULTURE SCREEN ONLY: CPT | Mod: AHULAB | Performed by: NURSE PRACTITIONER

## 2025-07-17 RX ORDER — CHLORHEXIDINE GLUCONATE ORAL RINSE 1.2 MG/ML
SOLUTION DENTAL
Qty: 473 ML | Refills: 0 | Status: SHIPPED | OUTPATIENT
Start: 2025-07-17 | End: 2025-07-23 | Stop reason: HOSPADM

## 2025-07-17 ASSESSMENT — ENCOUNTER SYMPTOMS
GASTROINTESTINAL NEGATIVE: 1
NECK NEGATIVE: 1
RESPIRATORY NEGATIVE: 1
NEUROLOGICAL NEGATIVE: 1
ARTHRALGIAS: 1
ENDOCRINE NEGATIVE: 1
CARDIOVASCULAR NEGATIVE: 1
CONSTITUTIONAL NEGATIVE: 1
LIMITED RANGE OF MOTION: 1

## 2025-07-17 NOTE — H&P (VIEW-ONLY)
"[facility]/PAT Evaluation       Name: Florina Doll (Florina Doll \"Dionne\")  /Age: 1955/70 y.o.     In-Person         Date of Consult: 25    Referring Provider: Dr. Flores     Date, Surgery, and Length:  25, left elbow total arthroplasty revision, 290 minutes      Patient presents to [facility] for perioperative risk assessment prior to scheduled surgery. 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 She is s/p left total elbow arthroplasty 2025 and has had ongoing difficulties. Patient s/p right total elbow arthroplasty 2025.        This note was created in part upon personal review of patient's medical records.        Pt denies any past history of anesthetic complications such as PONV, awareness, prolonged sedation, dental damage, aspiration, cardiac arrest, difficult intubation, difficult I.V. access or unexpected hospital admissions. No history of malignant hyperthermia and or pseudocholinesterase deficiency.    No history of blood transfusions.    The patient IS NOT a Yazidi and will accept blood and blood products if medically indicated.     Type and screen was not sent.      Past Medical History:   Diagnosis Date    Arthritis     Arthritis of elbow, left     s/p Left Elbow Total Arthroplasty 2024    Arthritis of elbow, right     Plan: Right Elbow Total Arthroplasty 3/19/2025    Cleft lip and cleft palate (HHS-HCC)     Diverticulosis     DVT (deep venous thrombosis) (Multi)     Right poipliteal, after TKA    Fractures     Triple malear fx L ankle    Hearing aid worn     Right ear only    HL (hearing loss)     Hyperlipidemia     Hypertension     Hypothyroidism     Obesity     PONV (postoperative nausea and vomiting)     Primary osteoarthritis, right ankle and foot     s/p ARTHRODESIS MIDTARSAL OR TARSOMETATARSAL, MULTIPLE OR TRANSVERSE   Right 23    RLS (restless legs syndrome)        Past Surgical History: "   Procedure Laterality Date    ADENOIDECTOMY      ANKLE FRACTURE SURGERY  1994    APPENDECTOMY  1990    CHOLECYSTECTOMY  1999    COLON SURGERY  1990    resection for diverticulitis    COLONOSCOPY      DILATION AND CURETTAGE OF UTERUS  1989 1980, 1986 lysis of adhesions    ELBOW ARTHROPLASTY Left 11/18/2024    ELBOW FUSION Left 1978    EXPLORATORY LAPAROTOMY  1981, 1990    FOOT FUSION Right 08/2023    ARTHRODESIS MIDTARSAL OR TARSOMETATARSAL, MULTIPLE OR TRANSVERSE    FOOT FUSION Right 12/2023    REVISION    FOOT SURGERY Bilateral     spherical implants    HARDWARE REMOVAL FOOT / ANKLE Left 1995    HARDWARE REMOVAL FOOT / ANKLE  12/12/2023    REMOVAL BURIED HARDWAR    KNEE ARTHROPLASTY      Both knees    LASIK Bilateral 1999    MOUTH SURGERY      multiple surgeries on cleft lip and palate    ORIF ANKLE FRACTURE Left 1994    RHINOPLASTY      SEPTOPLASTY  1975    SINUS SURGERY      1975    TONSILLECTOMY      TOTAL KNEE ARTHROPLASTY Right 2019    TOTAL KNEE ARTHROPLASTY Left     TYMPANOSTOMY TUBE PLACEMENT      WRIST SURGERY Right 2005     Family History   Problem Relation Name Age of Onset    Depression Mother Kathi     Hyperlipidemia Father Dad     Hypertension Father Dad     Accidental death Brother Cosmo         MVA    Other (other) Brother          septic shock post op    Stroke Paternal Grandfather [facility]     Arthritis Paternal Grandmother Kareen Frausto     Heart disease Paternal Grandmother [facility]     Heart disease Brother Hayden     Stroke Maternal Grandfather [facility]        Allergies   Allergen Reactions    Clarithromycin Swelling    Sulfa (Sulfonamide Antibiotics) Itching and Rash    Topiramate Swelling, Rash and Other     Hands and feet go numb    Cefazolin Rash    Iodinated Contrast Media Itching and Rash     ITCHED ALL OVER    Morphine Hives and Itching     Social History     Tobacco Use   Smoking Status Never   Smokeless Tobacco Never     Social History     Substance and Sexual Activity  "  Alcohol Use Never     Social History     Substance and Sexual Activity   Drug Use Never         Current Outpatient Medications   Medication Instructions    acetaminophen (TYLENOL) 1,000 mg, Every 8 hours PRN    amLODIPine (NORVASC) 5 mg, Daily    b complex vitamins capsule 1 capsule, Daily    BERBERINE CHLORIDE ORAL 1,500 mg, Daily    calcium carbonate (Tums) 200 mg calcium chewable tablet 1 tablet, 4 times daily PRN    calcium carbonate-vitamin D3 600 mg-5 mcg (200 unit) tablet 2 tablets, Daily    cetirizine (ZYRTEC) 10 mg, Daily before breakfast    chlorhexidine (Peridex) 0.12 % solution SWISH AND SPIT 15ML FOR 30 SECONDS NIGHT PRIOR TO SURGERY AND MORNING OF SURGERY    garlic 1,000 mg capsule 1 capsule, Daily    irbesartan-hydrochlorothiazide (Avalide) 300-12.5 mg tablet 1 tablet, Daily RT    krill oil 500 mg capsule 1 capsule, Daily    levothyroxine (Synthroid, Levoxyl) 125 mcg tablet 1 tablet, Daily (0630)    multivitamin tablet 1 tablet, Daily    NON FORMULARY 2 each, Daily    omeprazole (PRILOSEC) 40 mg, oral, Daily before breakfast, Do not crush or chew.    red yeast rice 600 mg capsule 2 capsules, Daily    rOPINIRole (REQUIP) 0.5 mg, Nightly       PAT ROS:   Constitutional:   neg    Neuro/Psych:   neg    Eyes:    use of corrective lenses  Ears:    hearing aides (right hearing aide)  Nose:   neg    Mouth:   neg    Throat:   neg    Neck:   neg    Cardio:   neg    Respiratory:   neg    Endocrine:   neg    GI:   neg    :   neg    Musculoskeletal:    arthralgias   decreased ROM  Hematologic:   neg    Skin:  neg        Physical Exam  Vitals reviewed. Physical exam within normal limits.          PAT AIRWAY:   Airway:     Mallampati::  III    Neck ROM::  Full  normal        Visit Vitals  /84   Pulse 88   Temp 36.9 °C (98.5 °F)   Resp 16   Ht (!) 1.54 m (5' 0.63\")   Wt 86 kg (189 lb 9.5 oz)   SpO2 97%   BMI 36.26 kg/m²   OB Status Postmenopausal   Smoking Status Never   BSA 1.92 m²       Assessment and " Plan:     Patient is a 70 year old female schedule for left elbow total arthroplasty revision on 7/23/25 with Dr. Flores.      Plan    Cardiovascular:     RCRI: 0 Risk of Mace: 3.9%        Patient denies any chest pain, tightness, heaviness, pressure, radiating pain, palpitations, irregular heartbeats, lightheadedness, cough, congestion, shortness of breath, RON, PND, near syncope, weight loss or gain.     Good functional capacity  CT calcium score May 2025  FINDINGS:  The score and distribution of calcium in the coronary arteries is as  follows:      LM: 0.  LAD: 0.  LCx: 0.  RCA: 0.      Total: 0.    EKG in PAT   Encounter Date: 03/06/25   ECG 12 Lead   Result Value    Ventricular Rate 66    Atrial Rate 66    MT Interval 132    QRS Duration 120    QT Interval 414    QTC Calculation(Bazett) 434    P Axis 16    R Axis 9    T Axis -4    QRS Count 10    Q Onset 224    P Onset 158    P Offset 205    T Offset 431    QTC Fredericia 427    Narrative    Normal sinus rhythm with sinus arrhythmia  Right bundle branch block  Abnormal ECG  When compared with ECG of 25-OCT-2024 10:38,  T wave inversion now evident in Anterior leads  Confirmed by Wesley La ([id number]) on 3/6/2025 12:53:44 PM     HTN- will continue Amlodipine, will hold Avalide 24 hours prior to surgery     Pulmonary:  No pulmonary diagnosis, however patient is at increased risk of perioperative complications secondary to  age > 60, obesity, duration of surgery > 2 hours, types of anesthetic  Stop Bang score is 3 placing patient at low risk for LIMA  ARISCAT: 26-44 points, 13.3% risk of in-hospital postoperative pulmonary complication  PRODIGY: Moderate risk for opioid induced respiratory depression      Renal/endo:  Recommendations to avoid nephrotoxic drugs and carefully monitor fluid status to maintain euvolemia. Use dose adjusted medications as needed for the underlying level of renal function.    Hypothyroidism- continue Levothyroxine    Heme:  Patient  instructed to ambulate as soon as possible postoperatively to decrease thromboembolic risk.    Initiate mechanical DVT prophylaxis as soon as possible and initiate chemical prophylaxis when deemed safe from a bleeding standpoint post surgery.      Risk assessment complete.  This patient is INTERMEDIATE risk candidate undergoing MODERATE risk procedure, patient is medically optimized for surgery.      Labs/testing obtained in PAT on 7/17/25: MRSA. Reviewed outside CMP, CBC. Reviewed last A1C    Lab Results   Component Value Date    HGBA1C 6.2 (H) 03/20/2025     OMPLETE BLOOD COUNT AND DIFFERENTIAL  Order: [id number]  Component  Ref Range & Units 7 d ago   WBC  3.70 - 11.00 k/uL 5.8   RBC  3.90 - 5.20 m/uL 4.72   Hemoglobin  11.5 - 15.5 g/dL 14.3   Hematocrit  36.0 - 46.0 % 43.3   MCV  80.0 - 100.0 fL 91.7   MCH  26.0 - 34.0 pg 30.3   MCHC  30.5 - 36.0 g/dL 33   RDW-CV  11.5 - 15.0 % 13   Platelet Count  150 - 400 k/uL 154   Comment: No clot detected.   MPV  9.0 - 12.7 fL 12.5   Neutrophils %  % 69.8   Abs Neut  1.45 - 7.50 k/uL 4.05   Lymphocytes %  % 21.4   Abs Lymph  1.00 - 4.00 k/uL 1.24   Monocytes %  % 5   Abs Mono  <0.87 k/uL 0.29   Eosinophils %  % 2.8   Abs Eosin  <0.46 k/uL 0.16   Basophils %  % 0.7   Abs Baso  <0.11 k/uL 0.04   Immature Granulocytes %  % 0.3   Abs Immature Gran  <0.10 k/uL <0.03   NRBC  /100 WBC 0   Absolute nRBC  <0.01 k/uL <0.01   Diff Type Auto   Resulting Agency [facility]     Specimen Collected: 07/10/25 10:59    Performed by: [facility] Last Resulted: 07/10/25 14:21       Contains abnormal data COMPREHENSIVE METABOLIC PANEL  Order: [id number]  Component  Ref Range & Units 7 d ago   Protein, Total  6.3 - 8.0 g/dL 7.2   Albumin  3.9 - 4.9 g/dL 4.6   Calcium, Total  8.5 - 10.2 mg/dL 9.7   Bilirubin, Total  0.2 - 1.3 mg/dL 0.3   Alkaline Phosphatase  34 - 123 U/L 92   AST  13 - 35 U/L 26   ALT  7 - 38 U/L 18   Glucose  74 - 99 mg/dL 137 High    Comment: The  American Diabetes Association (ADA) provides guidance for cutoff values for fasting glucose and random glucose. The ADA defines fasting as no caloric intake for at least 8 hours. Fasting plasma glucose results between 100 to 125 mg/dL indicate increased risk for diabetes (prediabetes).  Fasting plasma glucose results greater than or equal to 126 mg/dL meet the criteria for diagnosis of diabetes. In the absence of unequivocal hyperglycemia, results should be confirmed by repeat testing. In a patient with classic symptoms of hyperglycemia or hyperglycemic crisis, random plasma glucose results greater than or equal to 200 mg/dL meet the criteria for diagnosis of diabetes.  Reference: Standards of Medical Care in Diabetes 2016, American Diabetes Association. Diabetes Care. 2016.39(Suppl 1).   BUN  7 - 21 mg/dL 20   Creatinine  0.58 - 0.96 mg/dL 0.71   Sodium  136 - 144 mmol/L 140   Potassium  3.7 - 5.1 mmol/L 3.6 Low    Chloride  98 - 107 mmol/L 101   CO2  22 - 30 mmol/L 24   Anion Gap  8 - 15 mmol/L 15   Estimated Glomerular Filtration Rate  >=60 mL/min/1.73m² 93   Comment: Estimated Glomerular Filtration Rate (eGFR) is calculated using the 2021 CKD-EPI creatinine equation. This equation utilizes serum creatinine, sex, and age as parameters. The creatinine assay has traceable calibration to isotope dilution-mass spectrometry. Refer to KDIGO guidelines for clinical interpretation. In patients with unstable renal function, e.g. those with acute kidney injury, the eGFR may not accurately reflect actual GFR.   Resulting Agency [facility]     Specimen Collected: 07/10/25 10:59    Performed by: [facility] Last Resulted: 07/10/25 16:42     Follow up/communication: if needed, please obtain T&S morning of surgery.      Preoperative medication instructions were provided and reviewed with the patient.  Any additional testing or evaluation was explained to the patient.  Nothing by mouth instructions  were discussed and patient's questions were answered prior to conclusion to this encounter.  Patient verbalized understanding of preoperative instructions given in preadmission testing; discharge instructions available in EMR.    This note was dictated with speech recognition.  Minor errors may have been detected during use of speech recognition.

## 2025-07-17 NOTE — CPM/PAT NURSE NOTE
"CPM/PAT Nurse Note      Name: Florina Doll (Florina Doll \"Dionne\")  /Age: 1955/70 y.o.       Medical History[1]    Surgical History[2]    Patient  reports that she is not currently sexually active and has had partner(s) who are male. She reports using the following method of birth control/protection: Post-menopausal.    Family History[3]    Allergies[4]    Prior to Admission medications   Medication Sig Start Date End Date Taking? Authorizing Provider   acetaminophen (Tylenol) 500 mg tablet Take 2 tablets (1,000 mg) by mouth every 8 hours if needed for mild pain (1 - 3).   Yes Historical Provider, MD   amLODIPine (Norvasc) 5 mg tablet Take 1 tablet (5 mg) by mouth once daily. 10/4/24  Yes Historical Provider, MD   b complex vitamins capsule Take 1 capsule by mouth once daily.   Yes Historical Provider, MD   BERBERINE CHLORIDE ORAL Take 1,500 mg by mouth once daily.   Yes Historical Provider, MD   calcium carbonate (Tums) 200 mg calcium chewable tablet Chew and swallow 1 tablet 4 times a day as needed for indigestion or heartburn.   Yes Historical Provider, MD   calcium carbonate-vitamin D3 600 mg-5 mcg (200 unit) tablet Take 2 tablets by mouth once daily.   Yes Historical Provider, MD   cetirizine (ZyrTEC) 10 mg tablet Take 1 tablet (10 mg) by mouth once daily in the morning. Take before meals.   Yes Historical Provider, MD   garlic 1,000 mg capsule Take 1 capsule by mouth once daily.   Yes Historical Provider, MD   irbesartan-hydrochlorothiazide (Avalide) 300-12.5 mg tablet Take 1 tablet by mouth once daily. 10/4/24 10/4/25 Yes Historical Provider, MD   krill oil 500 mg capsule Take 1 capsule (500 mg) by mouth once daily.   Yes Historical Provider, MD   levothyroxine (Synthroid, Levoxyl) 125 mcg tablet Take 1 tablet (125 mcg) by mouth early in the morning.. 24  Yes Historical Provider, MD   multivitamin tablet Take 1 tablet by mouth once daily.   Yes Historical Provider, MD   NON FORMULARY Take 2 " each by mouth once daily. MORINGA   Yes Historical Provider, MD   omeprazole (PriLOSEC) 40 mg DR capsule Take 1 capsule (40 mg) by mouth once daily in the morning. Take before meals for 3 days. Do not crush or chew. 3/19/25 7/17/25 Yes RONAL Wheeler   red yeast rice 600 mg capsule Take 2 capsules by mouth once daily.   Yes Historical Provider, MD   rOPINIRole (Requip) 0.5 mg tablet Take 1 tablet (0.5 mg) by mouth once daily at bedtime.   Yes Historical Provider, MD   chlorhexidine (Peridex) 0.12 % solution SWISH AND SPIT 15ML FOR 30 SECONDS NIGHT PRIOR TO SURGERY AND MORNING OF SURGERY 7/17/25   RONAL Morrison        PAT ROS     DASI Risk Score      Flowsheet Row Questionnaire Series Submission from 1/6/2025 in [facility] with Generic Provider Edu   Can you take care of yourself (eat, dress, bathe, or use toilet)?  2.75  filed at 01/06/2025 1947   Can you walk indoors, such as around your house? 1.75  filed at 01/06/2025 1947   Can you walk a block or two on level ground?  2.75  filed at 01/06/2025 1947   Can you climb a flight of stairs or walk up a hill? 5.5  filed at 01/06/2025 1947   Can you run a short distance? 0  filed at 01/06/2025 1947   Can you do light work around the house like dusting or washing dishes? 2.7  filed at 01/06/2025 1947   Can you do moderate work around the house like vacuuming, sweeping floors or carrying groceries? 3.5  filed at 01/06/2025 1947   Can you do heavy work around the house like scrubbing floors or lifting and moving heavy furniture?  0  filed at 01/06/2025 1947   Can you do yard work like raking leaves, weeding or pushing a mower? 0  filed at 01/06/2025 1947   Can you have sexual relations? 5.25  filed at 01/06/2025 1947   Can you participate in moderate recreational activities like golf, bowling, dancing, doubles tennis or throwing a baseball or football? 0  filed at 01/06/2025 1947   Can you participate in strenous sports like swimming, singles  tennis, football, basketball, or skiing? 0  filed at 01/06/2025 1947   DASI SCORE 24.2  filed at 01/06/2025 1947   METS Score (Will be calculated only when all the questions are answered) 5.7  filed at 01/06/2025 1947     Caprini DVT Assessment      Flowsheet Row Admission (Discharged) from 3/19/2025 in [facility] with Michael Flores MD Admission (Discharged) from 11/18/2024 in [facility] with Michael Flores MD   DVT Score (IF A SCORE IS NOT CALCULATING, MUST SELECT A BMI TO COMPLETE) 8 filed at 03/19/2025 0932 6 filed at 11/18/2024 0727   Surgical Factors Major surgery planned, lasting 2-3 hours filed at 03/19/2025 0932 Major surgery planned, lasting 2-3 hours filed at 11/18/2024 0727   BMI (BMI MUST BE CHOSEN) 31-40 (Obesity) filed at 03/19/2025 0932 30 or less filed at 11/18/2024 0727     Modified Frailty Index    No data to display  [id number] Stroke Risk Points  Current as of 3 minutes ago        N/A 0 to 9 Points:      Last Change: N/A          The UMS5NW2-NYAz risk score (Lip GH, et al. 2009. © 2010 American College of Chest Physicians) quantifies the risk of stroke for a patient with atrial fibrillation. For patients without atrial fibrillation or under the age of 18 this score appears as N/A. Higher score values generally indicate higher risk of stroke.        This score is not applicable to this patient. Components are not calculated.          Revised Cardiac Risk Index    No data to display  Apfel Simplified Score    No data to display  Risk Analysis Index Results This Encounter    No data found in the last 10 encounters.       Stop Bang Score      Flowsheet Row Questionnaire Series Submission from 1/6/2025 in [facility] with Generic Provider Edu   Do you snore loudly? 0  filed at 01/06/2025 1947   Do you often feel tired or fatigued after your sleep? 0  filed at 01/06/2025 1947   Has anyone ever observed you stop breathing in your sleep? 0   filed at 01/06/2025 1947   Do you have or are you being treated for high blood pressure? 1  filed at 01/06/2025 1947   Recent BMI (Calculated) 35.7  filed at 01/06/2025 1947   Is BMI greater than 35 kg/m2? 1=Yes  filed at 01/06/2025 1947   Age older than 50 years old? 1=Yes  filed at 01/06/2025 1947   Gender - Male 0=No  filed at 01/06/2025 1947     Prodigy: High Risk  Total Score: 8              Prodigy Age Score           ARISCAT Score for Postoperative Pulmonary Complications    No data to display  Cherry Perioperative Risk for Myocardial Infarction or Cardiac Arrest (SOUMYA)    No data to display      Nurse Plan of Action:       After Visit Summary (AVS) reviewed and patient verbalized good understanding of medications and NPO instructions.  Pre-op infection prevention measures:  CHG showers and mouthwash reviewed, understanding voiced.  CHG soap given and patient verbalized need to pick CHG mouthwash at their preferred local pharmacy.            [1]   Past Medical History:  Diagnosis Date    Arthritis     Arthritis of elbow, left     s/p Left Elbow Total Arthroplasty 11/18/2024    Arthritis of elbow, right     Plan: Right Elbow Total Arthroplasty 3/19/2025    Cleft lip and cleft palate (HHS-HCC)     Diverticulosis     DVT (deep venous thrombosis) (Multi)     Right poipliteal, after TKA    Fractures 1994    Triple malear fx L ankle    Hearing aid worn     Right ear only    HL (hearing loss)     Hyperlipidemia     Hypertension     Hypothyroidism     Obesity     PONV (postoperative nausea and vomiting)     Primary osteoarthritis, right ankle and foot     s/p ARTHRODESIS MIDTARSAL OR TARSOMETATARSAL, MULTIPLE OR TRANSVERSE   Right 8/18/23    RLS (restless legs syndrome)    [2]   Past Surgical History:  Procedure Laterality Date    ADENOIDECTOMY      ANKLE FRACTURE SURGERY  1994    APPENDECTOMY  1990    CHOLECYSTECTOMY  1999    COLON SURGERY  1990    resection for diverticulitis    COLONOSCOPY      DILATION AND  CURETTAGE OF UTERUS  1989 1980, 1986 lysis of adhesions    ELBOW ARTHROPLASTY Left 11/18/2024    ELBOW FUSION Left 1978    EXPLORATORY LAPAROTOMY  1981, 1990    FOOT FUSION Right 08/2023    ARTHRODESIS MIDTARSAL OR TARSOMETATARSAL, MULTIPLE OR TRANSVERSE    FOOT FUSION Right 12/2023    REVISION    FOOT SURGERY Bilateral     spherical implants    HARDWARE REMOVAL FOOT / ANKLE Left 1995    HARDWARE REMOVAL FOOT / ANKLE  12/12/2023    REMOVAL BURIED HARDWAR    KNEE ARTHROPLASTY      Both knees    LASIK Bilateral 1999    MOUTH SURGERY      multiple surgeries on cleft lip and palate    ORIF ANKLE FRACTURE Left 1994    RHINOPLASTY      SEPTOPLASTY  1975    SINUS SURGERY      1975    TONSILLECTOMY      TOTAL KNEE ARTHROPLASTY Right 2019    TOTAL KNEE ARTHROPLASTY Left     TYMPANOSTOMY TUBE PLACEMENT      WRIST SURGERY Right 2005   [3]   Family History  Problem Relation Name Age of Onset    Depression Mother Kathi     Hyperlipidemia Father Dad     Hypertension Father Dad     Accidental death Brother Cosmo         MVA    Other (other) Brother          septic shock post op    Stroke Paternal Grandfather [facility]     Arthritis Paternal Grandmother Kareen Frausto     Heart disease Paternal Grandmother [facility]     Heart disease Brother Hayden     Stroke Maternal Grandfather [facility]    [4]   Allergies  Allergen Reactions    Clarithromycin Swelling    Sulfa (Sulfonamide Antibiotics) Itching and Rash    Topiramate Swelling, Rash and Other     Hands and feet go numb    Cefazolin Rash    Iodinated Contrast Media Itching and Rash     ITCHED ALL OVER    Morphine Hives and Itching

## 2025-07-17 NOTE — CPM/PAT H&P
"[facility]/PAT Evaluation       Name: Florina Doll (Florina Doll \"Dionne\")  /Age: 1955/70 y.o.     In-Person         Date of Consult: 25    Referring Provider: Dr. Flores     Date, Surgery, and Length:  25, left elbow total arthroplasty revision, 290 minutes      Patient presents to [facility] for perioperative risk assessment prior to scheduled surgery. 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 She is s/p left total elbow arthroplasty 2025 and has had ongoing difficulties. Patient s/p right total elbow arthroplasty 2025.        This note was created in part upon personal review of patient's medical records.        Pt denies any past history of anesthetic complications such as PONV, awareness, prolonged sedation, dental damage, aspiration, cardiac arrest, difficult intubation, difficult I.V. access or unexpected hospital admissions. No history of malignant hyperthermia and or pseudocholinesterase deficiency.    No history of blood transfusions.    The patient IS NOT a [employer] and will accept blood and blood products if medically indicated.     Type and screen was not sent.      Past Medical History:   Diagnosis Date    Arthritis     Arthritis of elbow, left     s/p Left Elbow Total Arthroplasty 2024    Arthritis of elbow, right     Plan: Right Elbow Total Arthroplasty 3/19/2025    Cleft lip and cleft palate ([facility]-HCC)     Diverticulosis     DVT (deep venous thrombosis) (Multi)     Right poipliteal, after TKA    Fractures     Triple malear fx L ankle    Hearing aid worn     Right ear only    HL (hearing loss)     Hyperlipidemia     Hypertension     Hypothyroidism     Obesity     PONV (postoperative nausea and vomiting)     Primary osteoarthritis, right ankle and foot     s/p ARTHRODESIS MIDTARSAL OR TARSOMETATARSAL, MULTIPLE OR TRANSVERSE   Right 23    RLS (restless legs syndrome)        Past Surgical History: "   Procedure Laterality Date    ADENOIDECTOMY      ANKLE FRACTURE SURGERY  1994    APPENDECTOMY  1990    CHOLECYSTECTOMY  1999    COLON SURGERY  1990    resection for diverticulitis    COLONOSCOPY      DILATION AND CURETTAGE OF UTERUS  1989 1980, 1986 lysis of adhesions    ELBOW ARTHROPLASTY Left 11/18/2024    ELBOW FUSION Left 1978    EXPLORATORY LAPAROTOMY  1981, 1990    FOOT FUSION Right 08/2023    ARTHRODESIS MIDTARSAL OR TARSOMETATARSAL, MULTIPLE OR TRANSVERSE    FOOT FUSION Right 12/2023    REVISION    FOOT SURGERY Bilateral     spherical implants    HARDWARE REMOVAL FOOT / ANKLE Left 1995    HARDWARE REMOVAL FOOT / ANKLE  12/12/2023    REMOVAL BURIED HARDWAR    KNEE ARTHROPLASTY      Both knees    LASIK Bilateral 1999    MOUTH SURGERY      multiple surgeries on cleft lip and palate    ORIF ANKLE FRACTURE Left 1994    RHINOPLASTY      SEPTOPLASTY  1975    SINUS SURGERY      1975    TONSILLECTOMY      TOTAL KNEE ARTHROPLASTY Right 2019    TOTAL KNEE ARTHROPLASTY Left     TYMPANOSTOMY TUBE PLACEMENT      WRIST SURGERY Right 2005     Family History   Problem Relation Name Age of Onset    Depression Mother Kathi     Hyperlipidemia Father Dad     Hypertension Father Dad     Accidental death Brother Cosmo         MVA    Other (other) Brother          septic shock post op    Stroke Paternal Grandfather [facility]     Arthritis Paternal Grandmother Kareen Frausto     Heart disease Paternal Grandmother [facility]     Heart disease Brother Hayden     Stroke Maternal Grandfather [facility]        Allergies   Allergen Reactions    Clarithromycin Swelling    Sulfa (Sulfonamide Antibiotics) Itching and Rash    Topiramate Swelling, Rash and Other     Hands and feet go numb    Cefazolin Rash    Iodinated Contrast Media Itching and Rash     ITCHED ALL OVER    Morphine Hives and Itching     Social History     Tobacco Use   Smoking Status Never   Smokeless Tobacco Never     Social History     Substance and Sexual Activity  "  Alcohol Use Never     Social History     Substance and Sexual Activity   Drug Use Never         Current Outpatient Medications   Medication Instructions    acetaminophen (TYLENOL) 1,000 mg, Every 8 hours PRN    amLODIPine (NORVASC) 5 mg, Daily    b complex vitamins capsule 1 capsule, Daily    BERBERINE CHLORIDE ORAL 1,500 mg, Daily    calcium carbonate (Tums) 200 mg calcium chewable tablet 1 tablet, 4 times daily PRN    calcium carbonate-vitamin D3 600 mg-5 mcg (200 unit) tablet 2 tablets, Daily    cetirizine (ZYRTEC) 10 mg, Daily before breakfast    chlorhexidine (Peridex) 0.12 % solution SWISH AND SPIT 15ML FOR 30 SECONDS NIGHT PRIOR TO SURGERY AND MORNING OF SURGERY    garlic 1,000 mg capsule 1 capsule, Daily    irbesartan-hydrochlorothiazide (Avalide) 300-12.5 mg tablet 1 tablet, Daily RT    krill oil 500 mg capsule 1 capsule, Daily    levothyroxine (Synthroid, Levoxyl) 125 mcg tablet 1 tablet, Daily (0630)    multivitamin tablet 1 tablet, Daily    NON FORMULARY 2 each, Daily    omeprazole (PRILOSEC) 40 mg, oral, Daily before breakfast, Do not crush or chew.    red yeast rice 600 mg capsule 2 capsules, Daily    rOPINIRole (REQUIP) 0.5 mg, Nightly       PAT ROS:   Constitutional:   neg    Neuro/Psych:   neg    Eyes:    use of corrective lenses  Ears:    hearing aides (right hearing aide)  Nose:   neg    Mouth:   neg    Throat:   neg    Neck:   neg    Cardio:   neg    Respiratory:   neg    Endocrine:   neg    GI:   neg    :   neg    Musculoskeletal:    arthralgias   decreased ROM  Hematologic:   neg    Skin:  neg        Physical Exam  Vitals reviewed. Physical exam within normal limits.          PAT AIRWAY:   Airway:     Mallampati::  III    Neck ROM::  Full  normal        Visit Vitals  /84   Pulse 88   Temp 36.9 °C (98.5 °F)   Resp 16   Ht (!) 1.54 m (5' 0.63\")   Wt 86 kg (189 lb 9.5 oz)   SpO2 97%   BMI 36.26 kg/m²   OB Status Postmenopausal   Smoking Status Never   BSA 1.92 m²       Assessment and " Plan:     Patient is a 70 year old female schedule for left elbow total arthroplasty revision on 7/23/25 with Dr. Flores.      Plan    Cardiovascular:     RCRI: 0 Risk of Mace: 3.9%        Patient denies any chest pain, tightness, heaviness, pressure, radiating pain, palpitations, irregular heartbeats, lightheadedness, cough, congestion, shortness of breath, RON, PND, near syncope, weight loss or gain.     Good functional capacity  CT calcium score May 2025  FINDINGS:  The score and distribution of calcium in the coronary arteries is as  follows:      LM: 0.  LAD: 0.  LCx: 0.  RCA: 0.      Total: 0.    EKG in PAT   Encounter Date: 03/06/25   ECG 12 Lead   Result Value    Ventricular Rate 66    Atrial Rate 66    AZ Interval 132    QRS Duration 120    QT Interval 414    QTC Calculation(Bazett) 434    P Axis 16    R Axis 9    T Axis -4    QRS Count 10    Q Onset 224    P Onset 158    P Offset 205    T Offset 431    QTC Fredericia 427    Narrative    Normal sinus rhythm with sinus arrhythmia  Right bundle branch block  Abnormal ECG  When compared with ECG of 25-OCT-2024 10:38,  T wave inversion now evident in Anterior leads  Confirmed by Wesley La ([id number]) on 3/6/2025 12:53:44 PM     HTN- will continue Amlodipine, will hold Avalide 24 hours prior to surgery     Pulmonary:  No pulmonary diagnosis, however patient is at increased risk of perioperative complications secondary to  age > 60, obesity, duration of surgery > 2 hours, types of anesthetic  Stop Bang score is 3 placing patient at low risk for LIMA  ARISCAT: 26-44 points, 13.3% risk of in-hospital postoperative pulmonary complication  PRODIGY: Moderate risk for opioid induced respiratory depression      Renal/endo:  Recommendations to avoid nephrotoxic drugs and carefully monitor fluid status to maintain euvolemia. Use dose adjusted medications as needed for the underlying level of renal function.    Hypothyroidism- continue Levothyroxine    Heme:  Patient  instructed to ambulate as soon as possible postoperatively to decrease thromboembolic risk.    Initiate mechanical DVT prophylaxis as soon as possible and initiate chemical prophylaxis when deemed safe from a bleeding standpoint post surgery.      Risk assessment complete.  This patient is INTERMEDIATE risk candidate undergoing MODERATE risk procedure, patient is medically optimized for surgery.      Labs/testing obtained in PAT on 7/17/25: MRSA. Reviewed outside CMP, CBC. Reviewed last A1C    Lab Results   Component Value Date    HGBA1C 6.2 (H) 03/20/2025     OMPLETE BLOOD COUNT AND DIFFERENTIAL  Order: [id number]  Component  Ref Range & Units 7 d ago   WBC  3.70 - 11.00 k/uL 5.8   RBC  3.90 - 5.20 m/uL 4.72   Hemoglobin  11.5 - 15.5 g/dL 14.3   Hematocrit  36.0 - 46.0 % 43.3   MCV  80.0 - 100.0 fL 91.7   MCH  26.0 - 34.0 pg 30.3   MCHC  30.5 - 36.0 g/dL 33   RDW-CV  11.5 - 15.0 % 13   Platelet Count  150 - 400 k/uL 154   Comment: No clot detected.   MPV  9.0 - 12.7 fL 12.5   Neutrophils %  % 69.8   Abs Neut  1.45 - 7.50 k/uL 4.05   Lymphocytes %  % 21.4   Abs Lymph  1.00 - 4.00 k/uL 1.24   Monocytes %  % 5   Abs Mono  <0.87 k/uL 0.29   Eosinophils %  % 2.8   Abs Eosin  <0.46 k/uL 0.16   Basophils %  % 0.7   Abs Baso  <0.11 k/uL 0.04   Immature Granulocytes %  % 0.3   Abs Immature Gran  <0.10 k/uL <0.03   NRBC  /100 WBC 0   Absolute nRBC  <0.01 k/uL <0.01   Diff Type Auto   Resulting Agency [facility]     Specimen Collected: 07/10/25 10:59    Performed by: [facility] Last Resulted: 07/10/25 14:21       Contains abnormal data COMPREHENSIVE METABOLIC PANEL  Order: [id number]  Component  Ref Range & Units 7 d ago   Protein, Total  6.3 - 8.0 g/dL 7.2   Albumin  3.9 - 4.9 g/dL 4.6   Calcium, Total  8.5 - 10.2 mg/dL 9.7   Bilirubin, Total  0.2 - 1.3 mg/dL 0.3   Alkaline Phosphatase  34 - 123 U/L 92   AST  13 - 35 U/L 26   ALT  7 - 38 U/L 18   Glucose  74 - 99 mg/dL 137 High    Comment: The  American Diabetes Association (ADA) provides guidance for cutoff values for fasting glucose and random glucose. The ADA defines fasting as no caloric intake for at least 8 hours. Fasting plasma glucose results between 100 to 125 mg/dL indicate increased risk for diabetes (prediabetes).  Fasting plasma glucose results greater than or equal to 126 mg/dL meet the criteria for diagnosis of diabetes. In the absence of unequivocal hyperglycemia, results should be confirmed by repeat testing. In a patient with classic symptoms of hyperglycemia or hyperglycemic crisis, random plasma glucose results greater than or equal to 200 mg/dL meet the criteria for diagnosis of diabetes.  Reference: Standards of Medical Care in Diabetes 2016, American Diabetes Association. Diabetes Care. 2016.39(Suppl 1).   BUN  7 - 21 mg/dL 20   Creatinine  0.58 - 0.96 mg/dL 0.71   Sodium  136 - 144 mmol/L 140   Potassium  3.7 - 5.1 mmol/L 3.6 Low    Chloride  98 - 107 mmol/L 101   CO2  22 - 30 mmol/L 24   Anion Gap  8 - 15 mmol/L 15   Estimated Glomerular Filtration Rate  >=60 mL/min/1.73m² 93   Comment: Estimated Glomerular Filtration Rate (eGFR) is calculated using the 2021 CKD-EPI creatinine equation. This equation utilizes serum creatinine, sex, and age as parameters. The creatinine assay has traceable calibration to isotope dilution-mass spectrometry. Refer to KDIGO guidelines for clinical interpretation. In patients with unstable renal function, e.g. those with acute kidney injury, the eGFR may not accurately reflect actual GFR.   Resulting Agency [facility]     Specimen Collected: 07/10/25 10:59    Performed by: [facility] Last Resulted: 07/10/25 16:42     Follow up/communication: if needed, please obtain T&S morning of surgery.      Preoperative medication instructions were provided and reviewed with the patient.  Any additional testing or evaluation was explained to the patient.  Nothing by mouth instructions  were discussed and patient's questions were answered prior to conclusion to this encounter.  Patient verbalized understanding of preoperative instructions given in preadmission testing; discharge instructions available in EMR.    This note was dictated with speech recognition.  Minor errors may have been detected during use of speech recognition.

## 2025-07-17 NOTE — PREPROCEDURE INSTRUCTIONS
Medication List            Accurate as of July 17, 2025 10:20 AM. Always use your most recent med list.                acetaminophen 500 mg tablet  Commonly known as: Tylenol  Medication Adjustments for Surgery: Take/Use as prescribed     amLODIPine 5 mg tablet  Commonly known as: Norvasc  Medication Adjustments for Surgery: Take/Use as prescribed     b complex vitamins capsule  Additional Medication Adjustments for Surgery: Take last dose 7 days before surgery     BERBERINE CHLORIDE ORAL  Additional Medication Adjustments for Surgery: Take last dose 7 days before surgery     calcium carbonate 500 mg (200 mg elemental) chewable tablet  Commonly known as: Tums  Additional Medication Adjustments for Surgery: Take last dose 7 days before surgery     calcium carbonate-vitamin D3 600 mg-5 mcg (200 unit) tablet  Additional Medication Adjustments for Surgery: Take last dose 7 days before surgery     cetirizine 10 mg tablet  Commonly known as: ZyrTEC  Medication Adjustments for Surgery: Do Not take on the morning of surgery     chlorhexidine 0.12 % solution  Commonly known as: Peridex  SWISH AND SPIT 15ML FOR 30 SECONDS NIGHT PRIOR TO SURGERY AND MORNING OF SURGERY  Medication Adjustments for Surgery: Take/Use as prescribed     garlic 1,000 mg capsule  Additional Medication Adjustments for Surgery: Take last dose 7 days before surgery     irbesartan-hydrochlorothiazide 300-12.5 mg tablet  Commonly known as: Avalide  Medication Adjustments for Surgery: Take last dose 1 day (24 hours) before surgery  Notes to patient: Do NOT take evening before surgery and do NOT take morning of surgery.     krill oil 500 mg capsule  Additional Medication Adjustments for Surgery: Take last dose 7 days before surgery     levothyroxine 125 mcg tablet  Commonly known as: Synthroid, Levoxyl  Medication Adjustments for Surgery: Take/Use as prescribed     multivitamin tablet  Additional Medication Adjustments for Surgery: Take last dose 7 days  before surgery     NON FORMULARY  Additional Medication Adjustments for Surgery: Take last dose 7 days before surgery     omeprazole 40 mg DR capsule  Commonly known as: PriLOSEC  Take 1 capsule (40 mg) by mouth once daily in the morning. Take before meals for 3 days. Do not crush or chew.  Medication Adjustments for Surgery: Take/Use as prescribed     red yeast rice 600 mg capsule  Additional Medication Adjustments for Surgery: Take last dose 7 days before surgery     rOPINIRole 0.5 mg tablet  Commonly known as: Requip  Medication Adjustments for Surgery: Take/Use as prescribed                  Preoperative Brain Exercises    What are brain exercises?  A brain exercise is any activity that engages your thinking (cognitive) skills.    What types of activities are considered brain exercises?  Jigsaw puzzles, crossword puzzles, word jumble, memory games, word search, and many more.  Many can be found free online or on your phone via a mobile manju.    Why should I do brain exercises before my surgery?  More recent research has shown brain exercise before surgery can lower the risk of postoperative delirium (confusion) which can be especially important for older adults.  Patients who did brain exercises for 5 to 10 hours (total) for the 7-10 days before surgery, cut their risk of postoperative delirium in half up to 1 week after surgery.        Preoperative Deep Breathing Exercises  Why it is important to do deep breathing exercises before my surgery?  Deep breathing exercises strengthen your breathing muscles.  This helps you to recover after your surgery and decreases the chance of breathing complications.  How are the deep breathing exercises done?  Sit straight with your back supported.  Breathe in deeply and slowly through your nose. Your lower rib cage should expand and your abdomen may move forward.  Hold that breath for 3 to 5 seconds.  Breathe out through pursed lips, slowly and completely.  Rest and repeat 10  times every hour while awake.  Rest longer if you become dizzy or lightheaded.        CONTACT SURGEON'S OFFICE IF YOU DEVELOP:  * Fever = 100.4 F   * New respiratory symptoms (e.g. cough, shortness of breath, respiratory distress, sore throat)  * Recent loss of taste or smell  *Flu like symptoms such as headache, fatigue or gastrointestinal symptoms  * You develop any open sores, shingles, burning or painful urination   AND/OR:  * You no longer wish to have the surgery.  * Any other personal circumstances change that may lead to the need to cancel or defer this surgery.  *You were admitted to any hospital within one week of your planned procedure.    SMOKING:  *Quitting smoking can make a huge difference to your health and recovery from surgery.    *If you need help with quitting, call 7-772-QUIT-NOW.    THE DAY OF SURGERY:  *Do not eat any food after midnight the night before your surgery.   *YOU MUST drink 14 OUNCES of clear liquids TWO hours before your instructed ARRIVAL TIME to the hospital. This includes water, black tea/coffee (no milk or cream), apple juice, clear broth and electrolyte drinks (Gatorade).  Please avoid clear liquids that are red in color.   *You may chew gum/mints up to TWO hours before your surgery/procedure.    SURGICAL TIME:  *You will be contacted between 2 p.m. and 6 p.m. the business day before your surgery with your arrival time.  *If you haven't received a call by 6pm, call [phone].  *Scheduled surgery times may change and you will be notified if this occurs-check your personal voicemail for any updates.    ON THE MORNING OF SURGERY:  *Wear comfortable, loose fitting clothing.   *Do not use moisturizers, creams, lotions or perfume.  *All jewelry and valuables should be left at home.  *Prosthetic devices such as contact lenses, hearing aids, dentures, eyelash extensions, hairpins and body piercing must be removed before surgery.    BRING WITH YOU:  *Photo ID and insurance  card  *Current list of medications and allergies  *Pacemaker/Defibrillator/Heart stent cards  *CPAP machine and mask  *Slings/splints/crutches  *Copy of your complete Advanced Directive/DHPOA-if applicable  *Neurostimulator implant remote    PARKING AND ARRIVAL:  *Check in at the Main Entrance desk and let them know you are here for surgery.  *You will be directed to the 2nd floor surgical waiting area.    IF YOU ARE HAVING OUTPATIENT/SAME DAY SURGERY:  *A responsible adult MUST accompany you at the time of discharge and stay with you for 24 hours after your surgery.  *You may NOT drive yourself home after surgery.  *You may use a taxi or ride sharing service ([employer], Uber) to return home ONLY if you are accompanied by a friend or family member.  *Instructions for resuming your medications will be provided by your surgeon.      Home Preoperative Antibacterial Shower     What is a home preoperative antibacterial shower?  This shower is a way of cleaning the skin with a germ killing soap before surgery.  The soap contains chlorhexidine, commonly known as CHG.  CHG is a soap for your skin with germ killing ability.  Let your doctor know if you are allergic to chlorhexidine.    Why do I need to take a preoperative antibacterial shower?  Skin is not sterile.  It is best to try to make your skin as free of germs as possible before surgery.  Proper cleansing with a germ killing soap before surgery can lower the number of germs on your skin.  This helps to reduce the risk of infection at the surgical site.  Following the instructions listed below will help you prepare your skin for surgery.      How do I use the CHG skin cleanser?  Steps:  Begin using your CHG soap five days before your scheduled surgery on ________________________.    Days 1-4 Shower before bed:  Wash your face and genitals with your normal soap and rinse.           2.    Apply the CHG soap to a clean wet washcloth.  Turn the water off or move away                 From the water spray to avoid premature rinsing of the CHG soap as you are applying.     3.   Lather your entire body from the neck down.  Do not use on your face or genitals.  4. Pay special attention to the area(s) where your incision(s) will be located unless they are on your face.  Avoid scrubbing your skin too hard.  The important point is to have the CHG soap sit on your skin for 3 minutes.    When the 3 minutes are up, turn on the water and rinse the CHG soap off your body completely.   Pat yourself dry with a clean, freshly-laundered towel.  Dress in clean, freshly laundered night clothes.    Be sure to change bed sheets and blankets at least on the first night of CHG body wash use. May change linens every night of the above protocol for maximum benefit.   Day 5:  Last shower is the morning of surgery: Follow above Instructions.    NOTE:        *Keep CHG soap out of eyes and ear canals   *DO NOT wash with regular soap on your body after you have used the CHG soap solution  *DO NOT apply powders, lotions, or perfume.  *Deodorant may be used days 1-4, BUT NOT the day of surgery    Who should I contact if I have any questions regarding the use of CHG soap?  Call the [facility], Preadmission Testing at [phone] if you have any questions.              Patient Information: Pre-Operative Infection Prevention Measures     Why did I have my nose, under my arms and groin swabbed?  The purpose of the swab is to identify Staphylococcus aureus inside your nose or on your skin.  The swab was sent to the laboratory for culture.  A positive swab/culture for Staphylococcus aureus is called colonization or carriage.      What is Staphylococcus aureus?  Staphylococcus aureus, also known as “staph”, is a germ found on the skin or in the nose of healthy people.  Sometimes Staphylococcus aureus can get into the body and cause an infection.  This can be minor (such as pimples, boils or other  skin problems).  It might also be serious (such as blood infection, pneumonia or a surgical site infection).    What is Staphylococcus aureus colonization or carriage?  Colonization or carriage means that a person has the germ but is not sick from it.  These bacteria can be spread on the hands or when breathing or sneezing.    How is Staphylococcus aureus spread?  It is most often spread by close contact with a person or item that carries it.    What happens if my culture is positive for Staphylococcus aureus?  Your doctor/medical team will use this information to guide any antibiotic treatment which may be necessary.  Regardless of the culture results, we will clean the inside of your nose with a betadine swab just before you have your surgery.      Will I get an infection if I have Staphylococcus aureus in my nose or on my skin?  Anyone can get an infection with Staphylococcus aureus.  However, the best way to reduce your risk of infection is to follow the instructions provided to you for the use of your CHG soap and dental rinse.        Who should I contact if I have any questions?  Call the [facility], Preadmission Testing at [phone] if you have any questions.          Patient Information: Oral/Dental Rinse  **This is a prescription; pick it up at your preferred local pharmacy **  What is oral/dental rinse?   It is a mouthwash. It is a way of cleaning the mouth with a germ killing solution before your surgery.  The solution contains chlorhexidine, commonly known as CHG.   It is used inside the mouth to kill a bacteria known as Staphylococcus aureus.  Let your doctor know if you are allergic to Chlorhexidine.    Why do I need to use CHG oral/dental rinse?  The CHG oral/dental rinse helps to kill a bacteria in your mouth known a Staphylococcus aureus.     This reduces the risk of infection at the surgical site.      Using your CHG oral/dental rinse  STEPS:  Use your CHG  oral/dental rinse after you brush your teeth the night before (at bedtime) and the morning of your surgery.  Follow all directions on your prescription label.    Use the cap on the container to measure 15ml (fill cap to fill line)  Swish (gargle if you can) the mouthwash in your mouth for at least 30 seconds, (do not to swallow) spit out  After you use your CHG rinse, do not rinse your mouth with water, drink or eat.  Please refer to prescription label for the appropriate time to resume oral intake  Dental rinse comes in one size bottle: 473ml ~16oz.  You will have leftover    rinse, discard after this use.    What side effects might I have using the CHG oral/dental rinse?  CHG rinse will stick to plaque on the teeth.  Brush and floss just before use.  Teeth brushing will help avoid staining of plaque during use.    Who should I contact if I have questions about the CHG oral/dental rinse?  Please call [facility], Preadmission Testing at [phone] if you have any questions

## 2025-07-19 LAB — STAPHYLOCOCCUS SPEC CULT: NORMAL

## 2025-07-22 DIAGNOSIS — G89.18 ACUTE POST-OPERATIVE PAIN: Primary | ICD-10-CM

## 2025-07-22 DIAGNOSIS — Z98.890 POST-OPERATIVE STATE: ICD-10-CM

## 2025-07-22 RX ORDER — ASPIRIN 81 MG/1
81 TABLET ORAL DAILY
Status: CANCELLED | OUTPATIENT
Start: 2025-07-24 | End: 2025-08-07

## 2025-07-22 RX ORDER — ACETAMINOPHEN 325 MG/1
650 TABLET ORAL EVERY 6 HOURS SCHEDULED
Status: CANCELLED | OUTPATIENT
Start: 2025-07-22

## 2025-07-22 RX ORDER — ONDANSETRON HYDROCHLORIDE 2 MG/ML
4 INJECTION, SOLUTION INTRAVENOUS EVERY 8 HOURS PRN
Status: CANCELLED | OUTPATIENT
Start: 2025-07-22

## 2025-07-22 RX ORDER — ONDANSETRON 4 MG/1
4 TABLET, FILM COATED ORAL EVERY 8 HOURS PRN
Status: CANCELLED | OUTPATIENT
Start: 2025-07-22

## 2025-07-22 RX ORDER — OXYCODONE AND ACETAMINOPHEN 5; 325 MG/1; MG/1
1 TABLET ORAL EVERY 4 HOURS PRN
Refills: 0 | Status: CANCELLED | OUTPATIENT
Start: 2025-07-22

## 2025-07-22 RX ORDER — NALOXONE HYDROCHLORIDE 1 MG/ML
0.2 INJECTION INTRAMUSCULAR; INTRAVENOUS; SUBCUTANEOUS EVERY 5 MIN PRN
Status: CANCELLED | OUTPATIENT
Start: 2025-07-22

## 2025-07-22 RX ORDER — CEFAZOLIN SODIUM 2 G/50ML
2 SOLUTION INTRAVENOUS EVERY 8 HOURS
Status: CANCELLED | OUTPATIENT
Start: 2025-07-22 | End: 2025-07-23

## 2025-07-22 RX ORDER — DOCUSATE SODIUM 100 MG/1
100 CAPSULE, LIQUID FILLED ORAL 2 TIMES DAILY
Status: CANCELLED | OUTPATIENT
Start: 2025-07-22

## 2025-07-22 RX ORDER — SODIUM CHLORIDE, SODIUM LACTATE, POTASSIUM CHLORIDE, CALCIUM CHLORIDE 600; 310; 30; 20 MG/100ML; MG/100ML; MG/100ML; MG/100ML
50 INJECTION, SOLUTION INTRAVENOUS CONTINUOUS
Status: CANCELLED | OUTPATIENT
Start: 2025-07-22 | End: 2025-07-23

## 2025-07-23 ENCOUNTER — HOSPITAL ENCOUNTER (OUTPATIENT)
Facility: HOSPITAL | Age: 70
Setting detail: OUTPATIENT SURGERY
Discharge: HOME | End: 2025-07-23
Attending: ORTHOPAEDIC SURGERY | Admitting: ORTHOPAEDIC SURGERY
Payer: MEDICARE

## 2025-07-23 ENCOUNTER — APPOINTMENT (OUTPATIENT)
Dept: RADIOLOGY | Facility: HOSPITAL | Age: 70
End: 2025-07-23
Payer: MEDICARE

## 2025-07-23 ENCOUNTER — ANESTHESIA EVENT (OUTPATIENT)
Dept: OPERATING ROOM | Facility: HOSPITAL | Age: 70
End: 2025-07-23
Payer: MEDICARE

## 2025-07-23 ENCOUNTER — ANESTHESIA (OUTPATIENT)
Dept: OPERATING ROOM | Facility: HOSPITAL | Age: 70
End: 2025-07-23
Payer: MEDICARE

## 2025-07-23 ENCOUNTER — PHARMACY VISIT (OUTPATIENT)
Dept: PHARMACY | Facility: CLINIC | Age: 70
End: 2025-07-23
Payer: COMMERCIAL

## 2025-07-23 VITALS
SYSTOLIC BLOOD PRESSURE: 130 MMHG | OXYGEN SATURATION: 94 % | TEMPERATURE: 97.2 F | HEART RATE: 80 BPM | RESPIRATION RATE: 16 BRPM | DIASTOLIC BLOOD PRESSURE: 70 MMHG

## 2025-07-23 DIAGNOSIS — M19.022 ARTHRITIS OF ELBOW, LEFT: ICD-10-CM

## 2025-07-23 PROCEDURE — C1713 ANCHOR/SCREW BN/BN,TIS/BN: HCPCS | Performed by: ORTHOPAEDIC SURGERY

## 2025-07-23 PROCEDURE — 7100000002 HC RECOVERY ROOM TIME - EACH INCREMENTAL 1 MINUTE: Performed by: ORTHOPAEDIC SURGERY

## 2025-07-23 PROCEDURE — 3700000001 HC GENERAL ANESTHESIA TIME - INITIAL BASE CHARGE: Performed by: ORTHOPAEDIC SURGERY

## 2025-07-23 PROCEDURE — 2500000005 HC RX 250 GENERAL PHARMACY W/O HCPCS: Performed by: ANESTHESIOLOGY

## 2025-07-23 PROCEDURE — 7100000010 HC PHASE TWO TIME - EACH INCREMENTAL 1 MINUTE: Performed by: ORTHOPAEDIC SURGERY

## 2025-07-23 PROCEDURE — 2500000004 HC RX 250 GENERAL PHARMACY W/ HCPCS (ALT 636 FOR OP/ED): Performed by: ANESTHESIOLOGIST ASSISTANT

## 2025-07-23 PROCEDURE — 2500000005 HC RX 250 GENERAL PHARMACY W/O HCPCS: Performed by: ORTHOPAEDIC SURGERY

## 2025-07-23 PROCEDURE — 64415 NJX AA&/STRD BRCH PLXS IMG: CPT | Performed by: ANESTHESIOLOGY

## 2025-07-23 PROCEDURE — 73070 X-RAY EXAM OF ELBOW: CPT | Mod: LEFT SIDE | Performed by: RADIOLOGY

## 2025-07-23 PROCEDURE — RXMED WILLOW AMBULATORY MEDICATION CHARGE

## 2025-07-23 PROCEDURE — 2500000005 HC RX 250 GENERAL PHARMACY W/O HCPCS: Performed by: ANESTHESIOLOGIST ASSISTANT

## 2025-07-23 PROCEDURE — 3600000004 HC OR TIME - INITIAL BASE CHARGE - PROCEDURE LEVEL FOUR: Performed by: ORTHOPAEDIC SURGERY

## 2025-07-23 PROCEDURE — 2780000003 HC OR 278 NO HCPCS: Performed by: ORTHOPAEDIC SURGERY

## 2025-07-23 PROCEDURE — 2500000002 HC RX 250 W HCPCS SELF ADMINISTERED DRUGS (ALT 637 FOR MEDICARE OP, ALT 636 FOR OP/ED): Performed by: ORTHOPAEDIC SURGERY

## 2025-07-23 PROCEDURE — C1776 JOINT DEVICE (IMPLANTABLE): HCPCS | Performed by: ORTHOPAEDIC SURGERY

## 2025-07-23 PROCEDURE — 7100000011 HC EXTENDED STAY RECOVERY HOURLY - NURSING UNIT

## 2025-07-23 PROCEDURE — 3600000009 HC OR TIME - EACH INCREMENTAL 1 MINUTE - PROCEDURE LEVEL FOUR: Performed by: ORTHOPAEDIC SURGERY

## 2025-07-23 PROCEDURE — 7100000009 HC PHASE TWO TIME - INITIAL BASE CHARGE: Performed by: ORTHOPAEDIC SURGERY

## 2025-07-23 PROCEDURE — 2720000007 HC OR 272 NO HCPCS: Performed by: ORTHOPAEDIC SURGERY

## 2025-07-23 PROCEDURE — 7100000001 HC RECOVERY ROOM TIME - INITIAL BASE CHARGE: Performed by: ORTHOPAEDIC SURGERY

## 2025-07-23 PROCEDURE — A24363 PR ARTHROPLASTY,ELBOW,TOTAL PROSTH REPL: Performed by: ANESTHESIOLOGY

## 2025-07-23 PROCEDURE — 2500000004 HC RX 250 GENERAL PHARMACY W/ HCPCS (ALT 636 FOR OP/ED): Performed by: ANESTHESIOLOGY

## 2025-07-23 PROCEDURE — 24363 REPLACE ELBOW JOINT: CPT | Performed by: ORTHOPAEDIC SURGERY

## 2025-07-23 PROCEDURE — 87205 SMEAR GRAM STAIN: CPT | Mod: AHULAB | Performed by: ANESTHESIOLOGIST ASSISTANT

## 2025-07-23 PROCEDURE — 73070 X-RAY EXAM OF ELBOW: CPT | Mod: LT

## 2025-07-23 PROCEDURE — A24363 PR ARTHROPLASTY,ELBOW,TOTAL PROSTH REPL: Performed by: ANESTHESIOLOGIST ASSISTANT

## 2025-07-23 PROCEDURE — 3700000002 HC GENERAL ANESTHESIA TIME - EACH INCREMENTAL 1 MINUTE: Performed by: ORTHOPAEDIC SURGERY

## 2025-07-23 PROCEDURE — 2500000005 HC RX 250 GENERAL PHARMACY W/O HCPCS: Performed by: NURSE PRACTITIONER

## 2025-07-23 DEVICE — IMPLANTABLE DEVICE: Type: IMPLANTABLE DEVICE | Site: ELBOW | Status: FUNCTIONAL

## 2025-07-23 DEVICE — SMARTSET GMV HIGH PERFORMANCE GENTAMICIN MEDIUM VISCOSITY BONE CEMENT 40G
Type: IMPLANTABLE DEVICE | Site: ELBOW | Status: FUNCTIONAL
Brand: SMARTSET

## 2025-07-23 DEVICE — IMPLANTABLE DEVICE
Type: IMPLANTABLE DEVICE | Site: ELBOW | Status: FUNCTIONAL
Brand: LATITUDE™ - ELBOW

## 2025-07-23 DEVICE — IMPLANTABLE DEVICE
Type: IMPLANTABLE DEVICE | Site: ELBOW | Status: FUNCTIONAL
Brand: LATITUDE™ EV

## 2025-07-23 RX ORDER — TRANEXAMIC ACID 1 G/10ML
INJECTION, SOLUTION INTRAVENOUS AS NEEDED
Status: DISCONTINUED | OUTPATIENT
Start: 2025-07-23 | End: 2025-07-23

## 2025-07-23 RX ORDER — MIDAZOLAM HYDROCHLORIDE 1 MG/ML
INJECTION, SOLUTION INTRAMUSCULAR; INTRAVENOUS
Status: COMPLETED | OUTPATIENT
Start: 2025-07-23 | End: 2025-07-23

## 2025-07-23 RX ORDER — PHENYLEPHRINE 10 MG/250 ML(40 MCG/ML)IN 0.9 % SOD.CHLORIDE INTRAVENOUS
CONTINUOUS PRN
Status: DISCONTINUED | OUTPATIENT
Start: 2025-07-23 | End: 2025-07-23

## 2025-07-23 RX ORDER — MIDAZOLAM HYDROCHLORIDE 2 MG/2ML
INJECTION, SOLUTION INTRAMUSCULAR; INTRAVENOUS AS NEEDED
Status: DISCONTINUED | OUTPATIENT
Start: 2025-07-23 | End: 2025-07-23

## 2025-07-23 RX ORDER — SODIUM CHLORIDE, SODIUM LACTATE, POTASSIUM CHLORIDE, CALCIUM CHLORIDE 600; 310; 30; 20 MG/100ML; MG/100ML; MG/100ML; MG/100ML
100 INJECTION, SOLUTION INTRAVENOUS CONTINUOUS
Status: DISCONTINUED | OUTPATIENT
Start: 2025-07-23 | End: 2025-07-23 | Stop reason: HOSPADM

## 2025-07-23 RX ORDER — HYDROMORPHONE HYDROCHLORIDE 1 MG/ML
INJECTION, SOLUTION INTRAMUSCULAR; INTRAVENOUS; SUBCUTANEOUS AS NEEDED
Status: DISCONTINUED | OUTPATIENT
Start: 2025-07-23 | End: 2025-07-23

## 2025-07-23 RX ORDER — SODIUM CHLORIDE 0.9 G/100ML
INJECTION, SOLUTION IRRIGATION AS NEEDED
Status: DISCONTINUED | OUTPATIENT
Start: 2025-07-23 | End: 2025-07-23 | Stop reason: HOSPADM

## 2025-07-23 RX ORDER — LIDOCAINE HYDROCHLORIDE 20 MG/ML
INJECTION, SOLUTION EPIDURAL; INFILTRATION; INTRACAUDAL; PERINEURAL AS NEEDED
Status: DISCONTINUED | OUTPATIENT
Start: 2025-07-23 | End: 2025-07-23

## 2025-07-23 RX ORDER — CEFAZOLIN 1 G/1
INJECTION, POWDER, FOR SOLUTION INTRAVENOUS AS NEEDED
Status: DISCONTINUED | OUTPATIENT
Start: 2025-07-23 | End: 2025-07-23

## 2025-07-23 RX ORDER — NORETHINDRONE AND ETHINYL ESTRADIOL 0.5-0.035
KIT ORAL AS NEEDED
Status: DISCONTINUED | OUTPATIENT
Start: 2025-07-23 | End: 2025-07-23

## 2025-07-23 RX ORDER — OXYCODONE HYDROCHLORIDE 5 MG/1
5 TABLET ORAL EVERY 6 HOURS PRN
Qty: 24 TABLET | Refills: 0 | Status: SHIPPED | OUTPATIENT
Start: 2025-07-23 | End: 2025-07-29

## 2025-07-23 RX ORDER — OXYCODONE HYDROCHLORIDE 5 MG/1
5 TABLET ORAL EVERY 4 HOURS PRN
Status: DISCONTINUED | OUTPATIENT
Start: 2025-07-23 | End: 2025-07-23 | Stop reason: HOSPADM

## 2025-07-23 RX ORDER — ONDANSETRON HYDROCHLORIDE 2 MG/ML
4 INJECTION, SOLUTION INTRAVENOUS ONCE AS NEEDED
Status: DISCONTINUED | OUTPATIENT
Start: 2025-07-23 | End: 2025-07-23 | Stop reason: HOSPADM

## 2025-07-23 RX ORDER — LIDOCAINE HYDROCHLORIDE 10 MG/ML
0.1 INJECTION, SOLUTION EPIDURAL; INFILTRATION; INTRACAUDAL; PERINEURAL ONCE
Status: DISCONTINUED | OUTPATIENT
Start: 2025-07-23 | End: 2025-07-23 | Stop reason: HOSPADM

## 2025-07-23 RX ORDER — FENTANYL CITRATE 50 UG/ML
INJECTION, SOLUTION INTRAMUSCULAR; INTRAVENOUS AS NEEDED
Status: DISCONTINUED | OUTPATIENT
Start: 2025-07-23 | End: 2025-07-23

## 2025-07-23 RX ORDER — ACETAMINOPHEN 325 MG/1
650 TABLET ORAL EVERY 4 HOURS PRN
Status: DISCONTINUED | OUTPATIENT
Start: 2025-07-23 | End: 2025-07-23 | Stop reason: HOSPADM

## 2025-07-23 RX ORDER — BUPIVACAINE HCL/EPINEPHRINE 0.5-1:200K
VIAL (ML) INJECTION
Status: COMPLETED | OUTPATIENT
Start: 2025-07-23 | End: 2025-07-23

## 2025-07-23 RX ORDER — ONDANSETRON HYDROCHLORIDE 2 MG/ML
INJECTION, SOLUTION INTRAVENOUS AS NEEDED
Status: DISCONTINUED | OUTPATIENT
Start: 2025-07-23 | End: 2025-07-23

## 2025-07-23 RX ORDER — LIDOCAINE HYDROCHLORIDE 40 MG/ML
SOLUTION TOPICAL AS NEEDED
Status: DISCONTINUED | OUTPATIENT
Start: 2025-07-23 | End: 2025-07-23

## 2025-07-23 RX ORDER — ROCURONIUM BROMIDE 10 MG/ML
INJECTION, SOLUTION INTRAVENOUS AS NEEDED
Status: DISCONTINUED | OUTPATIENT
Start: 2025-07-23 | End: 2025-07-23

## 2025-07-23 RX ORDER — ALBUTEROL SULFATE 0.83 MG/ML
2.5 SOLUTION RESPIRATORY (INHALATION) ONCE AS NEEDED
Status: DISCONTINUED | OUTPATIENT
Start: 2025-07-23 | End: 2025-07-23 | Stop reason: HOSPADM

## 2025-07-23 RX ORDER — APREPITANT 40 MG/1
40 CAPSULE ORAL ONCE
Status: COMPLETED | OUTPATIENT
Start: 2025-07-23 | End: 2025-07-23

## 2025-07-23 RX ORDER — PROPOFOL 10 MG/ML
INJECTION, EMULSION INTRAVENOUS AS NEEDED
Status: DISCONTINUED | OUTPATIENT
Start: 2025-07-23 | End: 2025-07-23

## 2025-07-23 RX ORDER — DOXYCYCLINE 100 MG/1
100 CAPSULE ORAL 2 TIMES DAILY
Qty: 28 CAPSULE | Refills: 0 | Status: SHIPPED | OUTPATIENT
Start: 2025-07-23 | End: 2025-08-06

## 2025-07-23 RX ORDER — DROPERIDOL 2.5 MG/ML
0.62 INJECTION, SOLUTION INTRAMUSCULAR; INTRAVENOUS ONCE AS NEEDED
Status: DISCONTINUED | OUTPATIENT
Start: 2025-07-23 | End: 2025-07-23 | Stop reason: HOSPADM

## 2025-07-23 RX ORDER — KETOROLAC TROMETHAMINE 10 MG/1
10 TABLET, FILM COATED ORAL EVERY 6 HOURS PRN
Qty: 12 TABLET | Refills: 0 | Status: SHIPPED | OUTPATIENT
Start: 2025-07-23 | End: 2025-07-26

## 2025-07-23 RX ORDER — IPRATROPIUM BROMIDE 0.5 MG/2.5ML
500 SOLUTION RESPIRATORY (INHALATION) ONCE
Status: DISCONTINUED | OUTPATIENT
Start: 2025-07-23 | End: 2025-07-23 | Stop reason: HOSPADM

## 2025-07-23 RX ADMIN — LIDOCAINE HYDROCHLORIDE 100 MG: 20 INJECTION, SOLUTION EPIDURAL; INFILTRATION; INTRACAUDAL; PERINEURAL at 13:34

## 2025-07-23 RX ADMIN — MIDAZOLAM HYDROCHLORIDE 2 MG: 1 INJECTION, SOLUTION INTRAMUSCULAR; INTRAVENOUS at 13:01

## 2025-07-23 RX ADMIN — ROCURONIUM BROMIDE 70 MG: 10 INJECTION, SOLUTION INTRAVENOUS at 13:34

## 2025-07-23 RX ADMIN — FENTANYL CITRATE 50 MCG: 50 INJECTION, SOLUTION INTRAMUSCULAR; INTRAVENOUS at 14:34

## 2025-07-23 RX ADMIN — EPHEDRINE SULFATE 5 MG: 50 INJECTION, SOLUTION INTRAVENOUS at 14:47

## 2025-07-23 RX ADMIN — HYDROMORPHONE HYDROCHLORIDE 0.4 MG: 1 INJECTION, SOLUTION INTRAMUSCULAR; INTRAVENOUS; SUBCUTANEOUS at 13:37

## 2025-07-23 RX ADMIN — SUGAMMADEX 200 MG: 100 INJECTION, SOLUTION INTRAVENOUS at 15:54

## 2025-07-23 RX ADMIN — CARBOXYMETHYLCELLULOSE SODIUM 4 DROP: 5 SOLUTION/ DROPS OPHTHALMIC at 13:34

## 2025-07-23 RX ADMIN — ROCURONIUM BROMIDE 30 MG: 10 INJECTION, SOLUTION INTRAVENOUS at 13:45

## 2025-07-23 RX ADMIN — MIDAZOLAM HYDROCHLORIDE 2 MG: 1 INJECTION, SOLUTION INTRAMUSCULAR; INTRAVENOUS at 13:31

## 2025-07-23 RX ADMIN — PROPOFOL 200 MG: 10 INJECTION, EMULSION INTRAVENOUS at 13:34

## 2025-07-23 RX ADMIN — DEXAMETHASONE SODIUM PHOSPHATE 4 MG: 4 INJECTION, SOLUTION INTRAMUSCULAR; INTRAVENOUS at 13:01

## 2025-07-23 RX ADMIN — FENTANYL CITRATE 50 MCG: 50 INJECTION, SOLUTION INTRAMUSCULAR; INTRAVENOUS at 13:34

## 2025-07-23 RX ADMIN — POVIDONE-IODINE 1 APPLICATION: 5 SOLUTION TOPICAL at 11:39

## 2025-07-23 RX ADMIN — PHENYLEPHRINE-NACL IV SOLUTION 10 MG/250ML-0.9% 0.3 MCG/KG/MIN: 10-0.9/25 SOLUTION at 13:45

## 2025-07-23 RX ADMIN — TRANEXAMIC ACID 1000 MG: 100 INJECTION, SOLUTION INTRAVENOUS at 13:39

## 2025-07-23 RX ADMIN — LIDOCAINE HYDROCHLORIDE 4 ML: 40 SOLUTION TOPICAL at 13:37

## 2025-07-23 RX ADMIN — SODIUM CHLORIDE: 9 INJECTION, SOLUTION INTRAVENOUS at 13:48

## 2025-07-23 RX ADMIN — ONDANSETRON 4 MG: 2 INJECTION, SOLUTION INTRAMUSCULAR; INTRAVENOUS at 15:18

## 2025-07-23 RX ADMIN — Medication 30 ML: at 13:01

## 2025-07-23 RX ADMIN — CEFAZOLIN 2 G: 1 INJECTION, POWDER, FOR SOLUTION INTRAMUSCULAR; INTRAVENOUS at 13:56

## 2025-07-23 RX ADMIN — PROPOFOL 30 MCG/KG/MIN: 10 INJECTION, EMULSION INTRAVENOUS at 13:45

## 2025-07-23 RX ADMIN — SODIUM CHLORIDE, SODIUM LACTATE, POTASSIUM CHLORIDE, AND CALCIUM CHLORIDE: .6; .31; .03; .02 INJECTION, SOLUTION INTRAVENOUS at 13:31

## 2025-07-23 RX ADMIN — APREPITANT 40 MG: 40 CAPSULE ORAL at 12:06

## 2025-07-23 ASSESSMENT — COLUMBIA-SUICIDE SEVERITY RATING SCALE - C-SSRS
6. HAVE YOU EVER DONE ANYTHING, STARTED TO DO ANYTHING, OR PREPARED TO DO ANYTHING TO END YOUR LIFE?: NO
2. HAVE YOU ACTUALLY HAD ANY THOUGHTS OF KILLING YOURSELF?: NO
1. IN THE PAST MONTH, HAVE YOU WISHED YOU WERE DEAD OR WISHED YOU COULD GO TO SLEEP AND NOT WAKE UP?: NO

## 2025-07-23 ASSESSMENT — PAIN SCALES - GENERAL
PAINLEVEL_OUTOF10: 0 - NO PAIN
PAINLEVEL_OUTOF10: 4

## 2025-07-23 ASSESSMENT — PAIN - FUNCTIONAL ASSESSMENT
PAIN_FUNCTIONAL_ASSESSMENT: 0-10

## 2025-07-23 NOTE — ANESTHESIA POSTPROCEDURE EVALUATION
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    Anesthesia Post Evaluation    Patient location during evaluation: bedside  Patient participation: complete - patient participated  Level of consciousness: awake  Pain management: adequate  Airway patency: patent  Cardiovascular status: acceptable  Respiratory status: acceptable  Hydration status: acceptable  Postoperative Nausea and Vomiting: none        No notable events documented.    "

## 2025-07-23 NOTE — ANESTHESIA PROCEDURE NOTES
Peripheral Block    Patient location during procedure: pre-op  Medication administered at: 7/23/2025 1:01 PM  End time: 7/23/2025 1:12 PM  Reason for block: procedure for pain, at surgeon's request and post-op pain management  Staffing  Performed: attending   Authorized by: Maxim Hill MD    Performed by: Maxim Hill MD  Preanesthetic Checklist  Completed: patient identified, IV checked, site marked, risks and benefits discussed, surgical consent, monitors and equipment checked, pre-op evaluation and timeout performed   Timeout performed at: 7/23/2025 1:10 PM  Peripheral Block  Patient position: sitting  Prep: alcohol swabs  Patient monitoring: continuous pulse ox  Block type: supraclavicular  Laterality: left  Injection technique: single-shot  Guidance: ultrasound guided  Local infiltration: lidocaine  Needle  Needle type: short-bevel   Needle gauge: 22 G  Needle length: 5 cm  Needle localization: ultrasound guidance       image stored in chart  Test dose: negative  Assessment  Injection assessment: negative aspiration for heme, no paresthesia on injection, incremental injection and local visualized surrounding nerve on ultrasound  Heart rate change: no  Slow fractionated injection: yes  Additional Notes  40 ml 0.375% bupivacaine with 1:200K epi and 4 mg dexamethasone injected  Medications Administered  dexAMETHasone (Decadron) injection - perineural injection   4 mg - 7/23/2025 1:01:00 PM  midazolam (VERSED) IV - intravenous   2 mg - 7/23/2025 1:01:00 PM  bupivacaine-EPINEPHrine (MARCAINE w/EPI) 0.5% -[id number] Perineural - perineural injection   30 mL - 7/23/2025 1:01:00 PM

## 2025-07-23 NOTE — ANESTHESIA PROCEDURE NOTES
Airway  Date/Time: 7/23/2025 1:37 PM  Reason: elective    Airway not difficult    Staffing  Performed: ANDREINA   Authorized by: Maxim Hill MD    Performed by: ANDREINA Moe  Patient location during procedure: OR    Patient Condition  Indications for airway management: anesthesia and airway protection  Patient position: sniffing  MILS maintained throughout  Planned trial extubation  Sedation level: deep     Final Airway Details   Preoxygenated: yes  Final airway type: endotracheal airway  Successful airway: ETT  Cuffed: yes   Successful intubation technique: video laryngoscopy (Gong 3)  Adjuncts used in placement: intubating stylet  Endotracheal tube insertion site: oral  Blade: Alka  Blade size: #3  ETT size (mm): 7.0  Cormack-Lehane Classification: grade I - full view of glottis  Placement verified by: chest auscultation and capnometry   Measured from: lips  ETT to lips (cm): 21  Number of attempts at approach: 1  Number of other approaches attempted: 0    Additional Comments  Easy grade I view. Teeth and lips in pre-op condition. LTA kit used.

## 2025-07-23 NOTE — ANESTHESIA PROCEDURE NOTES
Peripheral IV  Date/Time: 7/23/2025 1:48 PM  Inserted by: ANDREINA Moe    Placement  Needle size: 18 G  Laterality: left  Location: foot  Local anesthetic: none  Site prep: alcohol  Technique: anatomical landmarks  Attempts: 1

## 2025-07-23 NOTE — DISCHARGE INSTRUCTIONS
You are nonweightbearing bearing to the left arm until your follow up appointment. Please leave arm in sling until follow up. Do not allow water to get into ace bandage. Keep covered in water proof dressing during hygiene care. Take baby aspirin and antibiotic until gone. Take oxycodone for serve pain and stool softens as needed to prevent constipation

## 2025-07-23 NOTE — OP NOTE
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]     Procedures  Left Elbow Total Arthroplasty Revision  32206 - ND ARTHRP ELBOW W[facility]DISTAL H[facility]      Surgeons      * Michael Flores - Primary    Resident/Fellow/Other Assistant:  Surgeons and Role:     * Catherine Kennedy MD - Resident - Assisting     * NIRMALA Wheeler-CNP - [facility] First Assist    Staff:   Circulator: Luci Roman Person: Betty Siddiqi Scrub: Alicia  Circulator: Ney    Anesthesia Staff: Anesthesiologist: Maxim Hill MD  C-AA: ANDREINA Moe    Procedure Summary  Anesthesia: Regional, General  ASA: III  Estimated Blood Loss: 40mL  Intra-op Medications:   Administrations occurring from 1230 to 1720 on 25:   Medication Name Total Dose   sodium chloride 0.9 % irrigation solution 4,000 mL   ceFAZolin (Ancef) vial 1 g 2 g   dexAMETHasone (Decadron) 4 mg/mL IV Syringe 2 mL 8 mg   ePHEDrine injection 5 mg   fentaNYL (Sublimaze) injection 50 mcg/mL 100 mcg   HYDROmorphone (Dilaudid) injection 1 mg/mL 0.4 mg   LR bolus Cannot be calculated   lidocaine (LTA Kit) for intubation 4 mL   lidocaine PF (Xylocaine-MPF) local injection 2 % 100 mg   lubricating eye drops ophthalmic solution 4 drop   midazolam PF (Versed) injection 1 mg/mL 2 mg   ondansetron (Zofran) 2 mg/mL injection 4 mg   phenylephrine (Mauricio-Synephrine) 10 mg in sodium chloride 0.9% 250 mL (0.04 mg/mL) infusion (premix) 2.3 mg   propofol (Diprivan) injection 10 mg/mL 415.43 mg   rocuronium (ZeMuron) 50 mg/5 mL injection 100 mg   NaCl 0.9 % bolus Cannot be calculated   tranexamic acid (Cyklokapron) injection 1,000 mg   bupivacaine 0.5%-EPINEPHrine (Marcaine w/ EPI) injection 30 mL   dexAMETHasone " (Decadron) 4 mg/mL IV Syringe 2 mL 4 mg   midazolam (Versed) injection 1 mg/mL 2 mg              Anesthesia Record               Intraprocedure I/O Totals          Intake    LR bolus 700.00 mL    NaCl 0.9 % bolus 600.00 mL    Tranexamic Acid 0.00 mL    The total shown is the total volume documented since Anesthesia Start was filed.    Phenylephrine Drip 0.00 mL    The total shown is the total volume documented since Anesthesia Start was filed.    Total Intake 1300 mL       Output    Urine 0 mL    Est. Blood Loss 100 mL    Total Output 100 mL       Net    Net Volume 1200 mL          Specimen:   ID Type Source Tests Collected by Time   A :  Blood Blood, Venous TYPE AND SCREEN (Canceled) Michael Flores MD 7/23/2025 1356   B : LEFT ELBOW #1 Swab HARDWARE TISSUE/WOUND CULTURE/SMEAR Michael Flores MD 7/23/2025 1434   C : LEFT ELBOW #2 Swab HARDWARE TISSUE/WOUND CULTURE/SMEAR Michael Flores MD 7/23/2025 1434   D : LEFT ELBOW #3 Swab HARDWARE TISSUE/WOUND CULTURE/SMEAR Michael Flores MD 7/23/2025 1434   E : LEFT ELBOW #4 Swab HARDWARE TISSUE/WOUND CULTURE/SMEAR Michael Flores MD 7/23/2025 1435   F : LEFT ELBOW #5 Swab HARDWARE TISSUE/WOUND CULTURE/SMEAR Michael Flores MD 7/23/2025 1435   G : LEFT ELBOW #6 Swab HARDWARE TISSUE/WOUND CULTURE/SMEAR Michael Flores MD 7/23/2025 1435                 Drains and/or Catheters: * None in log *    Tourniquet Times:     Total Tourniquet Time Documented:  Arm - Upper (Left) - 55 minutes  Total: Arm - Upper (Left) - 55 minutes      Implants:  Implants       Type Name Action Serial No.      Joint STEM, ULNAR STANDARD SMALL LEFT - [id number] - [id number] Implanted [id number]     Joint ULNAR CAP, SMALL - [id number] - [id number] Implanted [id number]     Implant CEMENT, GENTAMICIN, SMARSET GMV, 40G US EO - SN/A - [id number] Implanted N/A     Screw PLUG, CEMENT, CLEARCUT, 8MM - SN/A - [id number] Implanted N/A              Findings: Consistent with  "diagnosis patient had a loose ulnar component.  Well-fixed humeral component triceps tendon tong approach was well-healed    Indications: Florina Doll \"Dionne\" is an 70 y.o. 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  Pain after elbow replacement loose implant    The patient was seen in the preoperative area. The risks, benefits, complications, treatment options, non-operative alternatives, expected recovery and outcomes were discussed with the patient. The possibilities of reaction to medication, pulmonary aspiration, injury to surrounding structures, bleeding, recurrent infection, the need for additional procedures, failure to diagnose a condition, and creating a complication requiring transfusion or operation were discussed with the patient. The patient concurred with the proposed plan, giving informed consent.  The site of surgery was properly noted/marked if necessary per policy. The patient has been actively warmed in preoperative area. Preoperative antibiotics have been ordered and given within 1 hours of incision. Venous thrombosis prophylaxis have been ordered including bilateral sequential compression devices    Procedure Details: Patient is a very pleasant female who had undergone a left total elbow replacement in the past.  She did well with this initially.  Unfortunate started having more pain.  X-rays and CT scan revealed the she had some loosening around the ulnar component as she did not have ingrowth.  Risk-benefit alternatives of revision elbow replacement were discussed at length including risk infection bleeding nerve injury periprosthetic fracture as well as further loosening and infection.  She understood wished to proceed  I did surgery she is seen the preop holding area her left elbow was identified marked correct operative site consent was signed shoulder elbow was marked.  Nerve block was performed.  She was taken the operating room.  We performed a huddle.  She " was a correct patient and her arm was correct operative site antibiotics and trans-Jones acid were dosed.  We reviewed her allergies.  She is intubated and sedated.  She was placed in a sloppy lateral position all bony prominences were well-padded.  We then placed a nonsterile tourniquet.  We prepped and draped in standard fashion.  We performed a timeout.  Elbow was the correct operative site.  We then made our standard posterior utility area incision.  We took this down.  We created soft tissue flaps over the triceps.  It was intact and healed.  The ulnar nerve had previously been transposed and was identified distal to the elbow.  A tag stitch around the soft tissue was there to kevin it.  We then did a repeat tong approach of the triceps we reflected it distally.  Opened up the joint clear joint fluid.  5 cultures were obtained.  We released subperiosteally anteriorly the humerus as well as around the ulna from the lateral approach.  We redisengage the elbow replacement.  We are able to get the implant out fairly easily.  We did an extensive debridement around the tissue.  We placed our guidewire and did very minimal flexible reamers just to remove some of the soft tissue to enhance our cement mantle.  We placed a cement restrictor.  She had very tight canal so a very small cement restrictor cut down was used.  We placed antibiotic cement with methylene blue.  Impacted implants in place reduced to placed our locking screw mechanism and held in full extension until the cement hardened.  Copiously irrigated the wound and removed excess cement.  Took the tourniquet down got hemostasis with Bovie electrocautery and bipolar.  We then repaired the triceps tong with a active braid and #2 FiberWire this nicely close down the triceps and close down the joint.  Jaida irrigated the wound again excellent hemostasis no need for a drain in at this time.  We closed the skin in standard fashion and placed her in a splint in  about 70 degrees of flexion woke her from anesthesia transferred the PACU where she was recovering.  I was present scrubbed for all critical portions of procedure please note that we will be billing for my nurse practitioner's first assist as she was critical in the center for successful completion of the case including positioning of the body, retraction, suture management, placement of the implants and wound closure. There was no qualified resident available for the entire case  Evidence of Infection: No   Complications:  None; patient tolerated the procedure well.    Disposition: PACU - hemodynamically stable.  Condition: stable             Task Performed by [facility] First Assist or Physician Assistant:   Loco/NP, was necessary to assist on this case due to the nature of the case and difficulty. During the case she served as my assist by retraction patient positioning placement implant wound closure placement of splint      Additional Details: Remove the splint in 7 to 10 days begin gentle active assisted flexion and passive extension no limits the range of motion at 2 weeks no lifting for 6 weeks    Attending Attestation: I was present and scrubbed for the key portions of the procedure.    Michael Flores  Phone Number: [phone]

## 2025-07-25 LAB
BACTERIA SPEC CULT: NORMAL
GRAM STN SPEC: NORMAL

## 2025-07-29 ENCOUNTER — OFFICE VISIT (OUTPATIENT)
Dept: ORTHOPEDIC SURGERY | Facility: HOSPITAL | Age: 70
End: 2025-07-29
Payer: MEDICARE

## 2025-07-29 ENCOUNTER — HOSPITAL ENCOUNTER (OUTPATIENT)
Dept: RADIOLOGY | Facility: HOSPITAL | Age: 70
Discharge: HOME | End: 2025-07-29
Payer: MEDICARE

## 2025-07-29 DIAGNOSIS — Z96.622: Primary | ICD-10-CM

## 2025-07-29 DIAGNOSIS — Z96.622: ICD-10-CM

## 2025-07-29 PROCEDURE — 99213 OFFICE O/P EST LOW 20 MIN: CPT | Performed by: NURSE PRACTITIONER

## 2025-07-29 PROCEDURE — 99024 POSTOP FOLLOW-UP VISIT: CPT | Performed by: NURSE PRACTITIONER

## 2025-07-29 PROCEDURE — 73080 X-RAY EXAM OF ELBOW: CPT | Mod: LT

## 2025-07-29 PROCEDURE — 1036F TOBACCO NON-USER: CPT | Performed by: NURSE PRACTITIONER

## 2025-07-29 PROCEDURE — 1125F AMNT PAIN NOTED PAIN PRSNT: CPT | Performed by: NURSE PRACTITIONER

## 2025-07-29 PROCEDURE — 73080 X-RAY EXAM OF ELBOW: CPT | Mod: LEFT SIDE | Performed by: RADIOLOGY

## 2025-07-29 PROCEDURE — 1160F RVW MEDS BY RX/DR IN RCRD: CPT | Performed by: NURSE PRACTITIONER

## 2025-07-29 PROCEDURE — 1159F MED LIST DOCD IN RCRD: CPT | Performed by: NURSE PRACTITIONER

## 2025-07-29 ASSESSMENT — PAIN SCALES - GENERAL: PAINLEVEL_OUTOF10: 1

## 2025-07-29 ASSESSMENT — PAIN - FUNCTIONAL ASSESSMENT: PAIN_FUNCTIONAL_ASSESSMENT: 0-10

## 2025-07-29 NOTE — PROGRESS NOTES
Provider Impression/Assessment:  Florina Doll is a pleasant 69 y.o. female who is returning to clinic accompanied by her  today. She is her for her first post-operative appointment of her left elbow total arthroplasty revision on 7/23/25.      Patient Discussion/Plan:   Dionne is doing well since surgery. Her incision is healing well. No signs of drainage today. No palpable pain or tenderness. Splint was removed today, placed in elbow flex brace. We will hold off on any formal therapy until she is a full 4 weeks out from surgery. She will start with gentle ROM at home on her own. Remain with no lifting over a coffee cup for the next 5 weeks. For the next month she should stay in the elbow brace, locked at 90 flexion and 10 of extension. This brace should be worn at all times. She can come out of the brace to shower and do her daily exercises. Otherwise it should be worn at all times and when sleeping. May extend the elbow as tolerated passively, under her control only. No passive movement with flexion passed 90 degrees at this time. Continue with active and passive range of motion of the wrist and hand as tolerated. No lifting over a coffee cup for the next 5 weeks.      We have scheduled to have Florina Doll see us back in 3 weeks for a repeat clinical check and repeat LEFT ELBOW XR at that time. We will likely start PT at that time for ROM only.       All questions were answered today to their satisfaction and they know how to reach our office if needed prior to the next scheduled visit with our office. Patient was discussed with Dr Flores and he agrees with the above plan.     Should you have any questions or concerns after your visit today, please do not hesitate to call the office at [phone]. I am honored to be a provider on your health care team and remain dedicated to helping you achieve your healthcare goals      -------------------------------------------------------------------------------------------------  CHIEF COMPLAINT:  POV LEFT ELBOW REVISION, REPLACEMENT   DOS: 7/23/25     History of Present Illness:  Florina returns to office for her first post-operative appointment of her left elbow total arthroplasty revision on 7/23/25.  She has kept her splint clean and dry.  She reports her elbow is doing well.  Splint was removed without incident.  Her incision was clean and dry. Mild edema. She has been icing over her splint. Pain is minimal. No use of narcotic medication at this time.      Review of Systems:   Review of systems for all 14 systems is negative for complaint except for the above noted findings in the history of present illness.     Family, social, and medical histories are obtained and reviewed.     Allergies:  Allergies         Allergies   Allergen Reactions    Clarithromycin Swelling    Sulfa (Sulfonamide Antibiotics) Itching and Rash    Topiramate Rash, Other and Swelling       Other reaction(s): Other: See Comments   Hands and feet go numb     Hands and feet go numb    Cefazolin Rash    Iodinated Contrast Media Itching and Rash       ITCHED ALL OVER    Morphine Hives, Itching and Rash            Medications:    Current Medications      Current Outpatient Medications:     acetaminophen (Tylenol) 500 mg tablet, Take 2 tablets (1,000 mg) by mouth every 8 hours if needed for mild pain (1 - 3)., Disp: , Rfl:     amLODIPine (Norvasc) 5 mg tablet, Take 1 tablet (5 mg) by mouth once daily., Disp: , Rfl:     b complex vitamins capsule, Take 1 capsule by mouth once daily., Disp: , Rfl:     BERBERINE CHLORIDE ORAL, Take 1,500 mg by mouth once daily., Disp: , Rfl:     calcium carbonate (Tums) 200 mg calcium chewable tablet, Chew 1 tablet (500 mg) 4 times a day as needed for indigestion or heartburn., Disp: , Rfl:     calcium carbonate-vitamin D3 600 mg-5 mcg (200 unit) tablet, Take 2 tablets by mouth once daily.,  Disp: , Rfl:     cetirizine (ZyrTEC) 10 mg tablet, Take 1 tablet (10 mg) by mouth once daily in the morning. Take before meals., Disp: , Rfl:     garlic 1,000 mg capsule, Take 1 capsule by mouth once daily., Disp: , Rfl:     ibuprofen 800 mg tablet, Take 1 tablet (800 mg) by mouth 3 times a day., Disp: , Rfl:     irbesartan-hydrochlorothiazide (Avalide) 300-12.5 mg tablet, Take 1 tablet by mouth once daily., Disp: , Rfl:     krill oil 500 mg capsule, Take 1 capsule (500 mg) by mouth once daily., Disp: , Rfl:     levothyroxine (Synthroid, Levoxyl) 125 mcg tablet, Take 1 tablet (125 mcg) by mouth early in the morning.., Disp: , Rfl:     multivitamin tablet, Take 1 tablet by mouth once daily., Disp: , Rfl:     NON FORMULARY, Take 2 each by mouth once daily. MORINGA, Disp: , Rfl:     red yeast rice 600 mg capsule, Take 2 capsules by mouth once daily., Disp: , Rfl:     rOPINIRole (Requip) 0.5 mg tablet, Take 1 tablet (0.5 mg) by mouth once daily at bedtime., Disp: , Rfl:         Diagnoses/Problems:  Bilateral elbow arthritis  S/p left revision TEA, ulnar component     Physical Examination:  Patient elbow incision is dry, intact and healing well. Moderate swelling. No active drainage today. No ecchymosis around elbow. No erythema or warmth. Neurovascularly intact. No pain with flexion and extension of wrist and thumb. Minimal pain with active flexion/extension of elbow about 20-90 degrees. Pronation/Supination of 80 degrees.      Results/Imaging:  Excellent alignment of the cemented ulnar component, total elbow replacement. I personally spent time viewing digital images of the radiology testing with the patient. Radiology results discussed with Dr. Flores.         *While intending to generate a timely document that accurately reflects the content of the visit, no guarantee can be provided that every grammatical or spelling mistake has been or will be identified or corrected. Thank you for your understanding.

## 2025-08-08 ENCOUNTER — APPOINTMENT (OUTPATIENT)
Dept: ORTHOPEDIC SURGERY | Facility: HOSPITAL | Age: 70
End: 2025-08-08
Payer: MEDICARE

## 2025-08-19 ENCOUNTER — HOSPITAL ENCOUNTER (OUTPATIENT)
Dept: RADIOLOGY | Facility: HOSPITAL | Age: 70
Discharge: HOME | End: 2025-08-19
Payer: MEDICARE

## 2025-08-19 ENCOUNTER — OFFICE VISIT (OUTPATIENT)
Dept: ORTHOPEDIC SURGERY | Facility: HOSPITAL | Age: 70
End: 2025-08-19
Payer: MEDICARE

## 2025-08-19 DIAGNOSIS — Z96.622: Primary | ICD-10-CM

## 2025-08-19 DIAGNOSIS — Z96.622: ICD-10-CM

## 2025-08-19 PROCEDURE — 99024 POSTOP FOLLOW-UP VISIT: CPT | Performed by: ORTHOPAEDIC SURGERY

## 2025-08-19 PROCEDURE — 99211 OFF/OP EST MAY X REQ PHY/QHP: CPT

## 2025-08-19 PROCEDURE — 73080 X-RAY EXAM OF ELBOW: CPT | Mod: LEFT SIDE | Performed by: RADIOLOGY

## 2025-08-19 PROCEDURE — 73080 X-RAY EXAM OF ELBOW: CPT | Mod: LT

## 2025-08-26 ENCOUNTER — APPOINTMENT (OUTPATIENT)
Dept: ORTHOPEDIC SURGERY | Facility: HOSPITAL | Age: 70
End: 2025-08-26
Payer: MEDICARE

## 2025-09-02 ENCOUNTER — APPOINTMENT (OUTPATIENT)
Dept: ORTHOPEDIC SURGERY | Facility: HOSPITAL | Age: 70
End: 2025-09-02
Payer: MEDICARE

## (undated) DEVICE — SUTURE, VICRYL PLUS, 0, 27IN, CT-2, UND, BRAIDED

## (undated) DEVICE — CUFF, TOURNIQUET, 18 X 3, DUAL PORT/SNGL BLADDER, DISP, LF

## (undated) DEVICE — Device

## (undated) DEVICE — SUTURE, VICRYL, 3-0, 27 IN, CT-2, UNDYED

## (undated) DEVICE — SOLUTION, IRRIGATION, SODIUM CHLORIDE 0.9%, 1000 ML, POUR BOTTLE

## (undated) DEVICE — PAD, GROUNDING, ELECTROSURGICAL, W/9 FT CABLE, POLYHESIVE II, ADULT, LF

## (undated) DEVICE — APPLICATION KIT, ADVANCED CEMENT MIXING/BIO-PREP CEMENT

## (undated) DEVICE — DRAPE, INCISE, ANTIMICROBIAL, IOBAN 2, LARGE, 17 X 23 IN, DISPOSABLE, STERILE

## (undated) DEVICE — KIT, MINOR, DOUBLE BASIN

## (undated) DEVICE — CLEANER, ELECTROSURGICAL, TIP, 5 X 5 CM, LF

## (undated) DEVICE — DRAPE, SHEET, THREE QUARTER, FAN FOLD, 57 X 77 IN

## (undated) DEVICE — EVACUATOR, WOUND, CLOSED, 3 SPRING, 400 CC, Y CONNECTING TUBE

## (undated) DEVICE — BANDAGE, COFLEX, 4 X 5 YDS, TAN, STERILE, LF

## (undated) DEVICE — CORD, CAUTERY, BIOPOLAR FORCEP, 12FT

## (undated) DEVICE — DRESSING, ISLAND, ADHESIVE, TELFA, 4 X 8 IN

## (undated) DEVICE — ADHESIVE, SKIN, DERMABOND ADVANCED, 15CM, PEN-STYLE

## (undated) DEVICE — GLOVE, SURGICAL, PROTEXIS PI ORTHO, 7.5, PF, LF

## (undated) DEVICE — SUTURE, VICRYL, 3-0, 27 IN, FS-2, UNDYED

## (undated) DEVICE — SUTURE, MONOCRYL, 3-0, 27 IN, PS-2, UNDYED

## (undated) DEVICE — COVER HANDLE LIGHT, STERIS, BLUE, STERILE

## (undated) DEVICE — GLOVE, SURGICAL, PROTEXIS PI MICRO, 8.0, PF, LF

## (undated) DEVICE — SUTURE, ETHILON, 3-0, 30 IN, FS-1, BLACK

## (undated) DEVICE — LOOP, VESSEL, MAXI, RED

## (undated) DEVICE — DRAIN, WOUND, FLAT, HUBLESS, FULL LENGTH PERFORATION, 10 MM X 20 CM, SILICONE

## (undated) DEVICE — DRAPE, SHEET, U, W/ADHESIVE STRIP, IMPERVIOUS, 60 X 70 IN, DISPOSABLE, LF, STERILE

## (undated) DEVICE — DRAPE, IRRIGATION, W/POUCH, ADHESIVE STRIP, STERI DRAPE, 19 X 23 IN, DISPOSABLE, STERILE

## (undated) DEVICE — BUR, ROUND, 4.0MM X 54.0 MM

## (undated) DEVICE — SUTURE, FIBERWIRE 2, T-5 TAPER NEEDLE, 38"

## (undated) DEVICE — STRIP, SKIN CLOSURE, STERI STRIP, REINFORCED, 0.5 X 4 IN

## (undated) DEVICE — BLADE, SAW, OSCILLATING/SAGITTAL, 9 X 18.5 X 0.51 MM, STERILE

## (undated) DEVICE — SUTURE, PDS II, 3-0, 18 IN, PS2, CLEAR

## (undated) DEVICE — SUTURE, VICRYL, 2-0, 27 IN, FSL, UNDYED

## (undated) DEVICE — BANDAGE, COFLEX, 4 X 5 YDS, FOAM TAN, STERILE, LF

## (undated) DEVICE — GLOVE, SURGICAL, PROTEXIS PI ORTHO, 8.5, PF, LF

## (undated) DEVICE — CUFF, TOURNIQUET, 18 X 4, DUAL PORT/SNGL BLADDER, DISP, LF

## (undated) DEVICE — DRAIN, WOUND, FLAT, JACKSON-PRATT, FULL PERFORATION, 7 MM, SILICONE

## (undated) DEVICE — WOUND SYSTEM, DEBRIDEMENT & CLEANING, O.R DUOPAK

## (undated) DEVICE — BLADE, OSCILLATING/SAGITTAL, AGGRESSIVE, WIDE

## (undated) DEVICE — ELECTRODE, ELECTROSURGICAL, BLADE EXT 4 INCH, INSULATED

## (undated) DEVICE — DRESSING, TEGADERM, CHG, 3.5 X 4.5 IN

## (undated) DEVICE — SUTURE, VICRYL 0, TAPER POINT, CT-1 VIOLET 27 INCH

## (undated) DEVICE — DRAIN, WOUND, FLAT, HUBLESS, 0.75 PERFORATION, 7 MM X 20 CM, SILICONE

## (undated) DEVICE — BANDAGE, ELASTIC, PREMIUM, SELF-CLOSE, 6 IN X 5.5 YD, STERILE

## (undated) DEVICE — SUTURE, VICRYL, 0, 36 IN, CT-1, UNDYED

## (undated) DEVICE — STOCKINETTE, DOUBLE PLY, 12 X 48 IN, STERILE

## (undated) DEVICE — SUTURE, VICRYL, 0, 27 IN, UR-6, VIOLET

## (undated) DEVICE — BLADE, OSCILLATING/SAGITTAL, 31MM X 9MM

## (undated) DEVICE — TIP, SUCTION, YANKAUER, W/O VENT, FLEXIBLE, OPEN TIP, HIGH CAPACITY

## (undated) DEVICE — BUR, ROUND, DIAMOND, MEDIUM, 4 X 48 MM

## (undated) DEVICE — CAUTERY, PENCIL, PUSH BUTTON, SMOKE EVAC, 70MM